# Patient Record
Sex: MALE | Race: WHITE | NOT HISPANIC OR LATINO | Employment: OTHER | URBAN - METROPOLITAN AREA
[De-identification: names, ages, dates, MRNs, and addresses within clinical notes are randomized per-mention and may not be internally consistent; named-entity substitution may affect disease eponyms.]

---

## 2022-02-07 ENCOUNTER — LAB VISIT (OUTPATIENT)
Dept: PRIMARY CARE CLINIC | Facility: OTHER | Age: 77
End: 2022-02-07
Attending: INTERNAL MEDICINE
Payer: MEDICARE

## 2022-02-07 DIAGNOSIS — U07.1 COVID-19: Primary | ICD-10-CM

## 2022-02-07 LAB
CTP QC/QA: YES
SARS-COV-2 AG RESP QL IA.RAPID: NEGATIVE

## 2022-02-07 PROCEDURE — 87811 SARS-COV-2 COVID19 W/OPTIC: CPT

## 2022-02-07 NOTE — PROGRESS NOTES
Instructions for Patients with Confirmed or Suspected COVID-19    If you are awaiting your test result, you will either be called or it will be released to the patient portal.  If you have any questions about your test, please visit [email or web address] or call our COVID-19 information line at [phone].      Please isolate yourself at home.  You may leave home and/or return to work once the following conditions are met:    If you have symptoms and tested positive:   More than 5 days since symptoms first appeared AND   More than 24 hours fever free without medications AND       symptoms have improved   · For five days after ending isolation, masks are required.    If you had no symptoms but tested positive:   More than 5 days since the date of the first positive test. If you develop symptoms, then use the guidelines above  · For five days after ending isolation, masks are required.      Testing is not recommended if you are symptom free after completing isolation.

## 2022-03-24 ENCOUNTER — LAB VISIT (OUTPATIENT)
Dept: LAB | Facility: HOSPITAL | Age: 77
End: 2022-03-24
Attending: PSYCHIATRY & NEUROLOGY
Payer: MEDICARE

## 2022-03-24 ENCOUNTER — OFFICE VISIT (OUTPATIENT)
Dept: NEUROLOGY | Facility: CLINIC | Age: 77
End: 2022-03-24
Payer: MEDICARE

## 2022-03-24 VITALS
HEART RATE: 50 BPM | DIASTOLIC BLOOD PRESSURE: 98 MMHG | WEIGHT: 272.25 LBS | RESPIRATION RATE: 16 BRPM | HEIGHT: 71 IN | BODY MASS INDEX: 38.11 KG/M2 | SYSTOLIC BLOOD PRESSURE: 160 MMHG

## 2022-03-24 DIAGNOSIS — M54.12 CERVICAL RADICULOPATHY: ICD-10-CM

## 2022-03-24 DIAGNOSIS — E66.01 SEVERE OBESITY: ICD-10-CM

## 2022-03-24 DIAGNOSIS — M54.16 LUMBAR RADICULOPATHY: ICD-10-CM

## 2022-03-24 DIAGNOSIS — R20.0 ANESTHESIA OF SKIN: ICD-10-CM

## 2022-03-24 DIAGNOSIS — G62.9 POLYNEUROPATHY: Primary | ICD-10-CM

## 2022-03-24 DIAGNOSIS — G56.03 BILATERAL CARPAL TUNNEL SYNDROME: ICD-10-CM

## 2022-03-24 DIAGNOSIS — R29.6 FALLS FREQUENTLY: ICD-10-CM

## 2022-03-24 DIAGNOSIS — I48.91 ATRIAL FIBRILLATION, UNSPECIFIED TYPE: ICD-10-CM

## 2022-03-24 DIAGNOSIS — G62.9 POLYNEUROPATHY: ICD-10-CM

## 2022-03-24 DIAGNOSIS — E11.9 TYPE 2 DIABETES MELLITUS WITHOUT COMPLICATION, WITHOUT LONG-TERM CURRENT USE OF INSULIN: ICD-10-CM

## 2022-03-24 LAB
TSH SERPL DL<=0.005 MIU/L-ACNC: 3.55 UIU/ML (ref 0.4–4)
VIT B12 SERPL-MCNC: 289 PG/ML (ref 210–950)

## 2022-03-24 PROCEDURE — 1159F PR MEDICATION LIST DOCUMENTED IN MEDICAL RECORD: ICD-10-PCS | Mod: CPTII,S$GLB,, | Performed by: PSYCHIATRY & NEUROLOGY

## 2022-03-24 PROCEDURE — 1160F PR REVIEW ALL MEDS BY PRESCRIBER/CLIN PHARMACIST DOCUMENTED: ICD-10-PCS | Mod: CPTII,S$GLB,, | Performed by: PSYCHIATRY & NEUROLOGY

## 2022-03-24 PROCEDURE — 1159F MED LIST DOCD IN RCRD: CPT | Mod: CPTII,S$GLB,, | Performed by: PSYCHIATRY & NEUROLOGY

## 2022-03-24 PROCEDURE — 84165 PATHOLOGIST INTERPRETATION SPE: ICD-10-PCS | Mod: 26,,, | Performed by: PATHOLOGY

## 2022-03-24 PROCEDURE — 99204 PR OFFICE/OUTPT VISIT, NEW, LEVL IV, 45-59 MIN: ICD-10-PCS | Mod: S$GLB,,, | Performed by: PSYCHIATRY & NEUROLOGY

## 2022-03-24 PROCEDURE — 3080F DIAST BP >= 90 MM HG: CPT | Mod: CPTII,S$GLB,, | Performed by: PSYCHIATRY & NEUROLOGY

## 2022-03-24 PROCEDURE — 84443 ASSAY THYROID STIM HORMONE: CPT | Performed by: PSYCHIATRY & NEUROLOGY

## 2022-03-24 PROCEDURE — 84165 PROTEIN E-PHORESIS SERUM: CPT | Performed by: PSYCHIATRY & NEUROLOGY

## 2022-03-24 PROCEDURE — 86334 IMMUNOFIX E-PHORESIS SERUM: CPT | Mod: 26,,, | Performed by: PATHOLOGY

## 2022-03-24 PROCEDURE — 3077F SYST BP >= 140 MM HG: CPT | Mod: CPTII,S$GLB,, | Performed by: PSYCHIATRY & NEUROLOGY

## 2022-03-24 PROCEDURE — 86334 PATHOLOGIST INTERPRETATION IFE: ICD-10-PCS | Mod: 26,,, | Performed by: PATHOLOGY

## 2022-03-24 PROCEDURE — 3077F PR MOST RECENT SYSTOLIC BLOOD PRESSURE >= 140 MM HG: ICD-10-PCS | Mod: CPTII,S$GLB,, | Performed by: PSYCHIATRY & NEUROLOGY

## 2022-03-24 PROCEDURE — 99204 OFFICE O/P NEW MOD 45 MIN: CPT | Mod: S$GLB,,, | Performed by: PSYCHIATRY & NEUROLOGY

## 2022-03-24 PROCEDURE — 3080F PR MOST RECENT DIASTOLIC BLOOD PRESSURE >= 90 MM HG: ICD-10-PCS | Mod: CPTII,S$GLB,, | Performed by: PSYCHIATRY & NEUROLOGY

## 2022-03-24 PROCEDURE — 86334 IMMUNOFIX E-PHORESIS SERUM: CPT | Performed by: PSYCHIATRY & NEUROLOGY

## 2022-03-24 PROCEDURE — 84165 PROTEIN E-PHORESIS SERUM: CPT | Mod: 26,,, | Performed by: PATHOLOGY

## 2022-03-24 PROCEDURE — 36415 COLL VENOUS BLD VENIPUNCTURE: CPT | Performed by: PSYCHIATRY & NEUROLOGY

## 2022-03-24 PROCEDURE — 82607 VITAMIN B-12: CPT | Performed by: PSYCHIATRY & NEUROLOGY

## 2022-03-24 PROCEDURE — 99999 PR PBB SHADOW E&M-EST. PATIENT-LVL IV: ICD-10-PCS | Mod: PBBFAC,,, | Performed by: PSYCHIATRY & NEUROLOGY

## 2022-03-24 PROCEDURE — 1160F RVW MEDS BY RX/DR IN RCRD: CPT | Mod: CPTII,S$GLB,, | Performed by: PSYCHIATRY & NEUROLOGY

## 2022-03-24 PROCEDURE — 1126F AMNT PAIN NOTED NONE PRSNT: CPT | Mod: CPTII,S$GLB,, | Performed by: PSYCHIATRY & NEUROLOGY

## 2022-03-24 PROCEDURE — 99999 PR PBB SHADOW E&M-EST. PATIENT-LVL IV: CPT | Mod: PBBFAC,,, | Performed by: PSYCHIATRY & NEUROLOGY

## 2022-03-24 PROCEDURE — 86592 SYPHILIS TEST NON-TREP QUAL: CPT | Performed by: PSYCHIATRY & NEUROLOGY

## 2022-03-24 PROCEDURE — 1126F PR PAIN SEVERITY QUANTIFIED, NO PAIN PRESENT: ICD-10-PCS | Mod: CPTII,S$GLB,, | Performed by: PSYCHIATRY & NEUROLOGY

## 2022-03-24 RX ORDER — TORSEMIDE 20 MG/1
20 TABLET ORAL DAILY
COMMUNITY
Start: 2022-01-25

## 2022-03-24 RX ORDER — METFORMIN HYDROCHLORIDE 500 MG/1
500 TABLET ORAL DAILY
COMMUNITY
Start: 2022-01-29 | End: 2022-11-21

## 2022-03-24 RX ORDER — RAMIPRIL 10 MG/1
10 CAPSULE ORAL DAILY
COMMUNITY
Start: 2022-01-20 | End: 2024-02-08 | Stop reason: SDUPTHER

## 2022-03-24 RX ORDER — NEBIVOLOL 5 MG/1
5 TABLET ORAL DAILY
COMMUNITY
Start: 2022-03-18 | End: 2022-11-18

## 2022-03-24 RX ORDER — DOXAZOSIN 2 MG/1
2 TABLET ORAL DAILY
COMMUNITY
Start: 2022-01-11 | End: 2024-01-05 | Stop reason: SDUPTHER

## 2022-03-24 RX ORDER — APIXABAN 5 MG/1
5 TABLET, FILM COATED ORAL 2 TIMES DAILY
COMMUNITY
Start: 2022-01-29 | End: 2023-02-13 | Stop reason: SDUPTHER

## 2022-03-24 RX ORDER — AMIODARONE HYDROCHLORIDE 200 MG/1
200 TABLET ORAL DAILY
COMMUNITY
Start: 2022-01-29 | End: 2022-11-18

## 2022-03-24 RX ORDER — ROSUVASTATIN CALCIUM 20 MG/1
20 TABLET, COATED ORAL NIGHTLY
COMMUNITY
Start: 2022-03-13 | End: 2023-06-16 | Stop reason: SDUPTHER

## 2022-03-24 NOTE — PROGRESS NOTES
HPI: Adan Bermudez is a 76 y.o. male here for evaluation of imbalance    He started with symptoms after having a bee sting and having to use epinephrine. He was found to have afib and his dizziness started around this time (2019).    He described no vertigo, states he was more off balance.       These symptoms have persisted.    He did have PT which helped when there.     He does fall frequently and sometimes with injury and has not always used walker    Numbness in the feet is tolerable.    Has some back pain chronically which is not radicular and this has been present for years.     Balance is worse first thing in the morning and worse in the shower- feels swaying.    Not light headed and no syncope.     Brings extensive records which I have reviewed  Saw ENT and had studies noted below    [place] [facility] [place] [place] complaint of tingling int he feet in 2020. He is a diabetic    Has some neck pain which he tolerates and no CTS symptoms    He states his DM2 is well controlled    Patient has a long history speech disorder. Feels for years that he sometimes slurred his speech (noted prior to MRI brain). Monitor      Review of Systems   Constitutional: Negative for fever.   HENT: Negative for nosebleeds.    Eyes: Negative for double vision.   Respiratory: Positive for shortness of breath.         Chronic SOB and states stress test was normal and cardiology recommended weight loss   Cardiovascular: Negative for leg swelling.   Gastrointestinal: Negative for blood in stool.   Genitourinary: Negative for hematuria.   Musculoskeletal: Positive for falls.   Skin: Negative for rash.   Neurological: Positive for sensory change.         I have reviewed all of this patient's past medical and surgical histories as well as family and social histories and active allergies and medications as documented in the electronic medical record.        Exam:  Gen Appearance, well developed/nourished in no apparent distress  CV: 2+ distal  pulses with no edema or swelling  Neuro:  MS: Awake, alert, oriented to place, person, time, situation. Sustains attention. Recent/remote memory intact, Language is full to spontaneous speech/repetition/naming/comprehension. Fund of Knowledge is full  CN: Optic discs are flat with normal vasculature, PERRL, Extraoccular movements and visual fields are full. Normal facial sensation and strength, Hearing symmetric, Tongue and Palate are midline and strong. Shoulder Shrug symmetric and strong.  Motor: Normal bulk, tone, no abnormal movements. 5/5 strength bilateral upper/lower extremities with 1+ reflexes and no clonus  Sensory: symmetric to light touch, pain, temp, and vibration but reduced in feet to all modalities, Romberg positive  Cerebellar: Finger-nose,Heal-shin, Rapid alternating movements intact  Gait: Normal stance, no ataxia and uses walker well    Imagin EMG/NCS of the arms: C5 and C6 more than C7 radicular disease and right more than left CTS   EMG/NCS of the legs: sensory and motor polyneuropathy and lumbar radicular changes    Labs:  CMP, CBC, unremarkable      Assessment/Plan: Adan Bermudez is a 76 y.o. male with imbalance for years. Found to have polyneuropathy by EMG/NCS in 2020 with Dr Stevens. He continues with poor balance and frequent falls.   I recommend:     1. Per outside/ [facility] records  - MRI brain showed mild atrophy and white matter changes  - EMG/NCS of the legs: sensory and motor polyneuropathy and lumbar radicular change. Has some back pain chronically which is not radicular and this has been present for years.Tolerates this well. Consider MRI L spine is worse  - EMG/NCS of the arms: C5 and C6 more than C7 radicular disease and right more than left CTS  -Per review of 2020 records from outside neurology ([facility]) Compression fractures (chronic) noted by MRI T spine and MRI C spine showed multilevel NF narrowing and disc bulging  without herniation  -Tolerates all  spinal pain well. No symptoms from CTS at this time.   -Patient has a long history speech disorder. Feels for years that he sometimes mildly slurs his speech (noted prior to MRI brain). Monitor    2. 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 Goal A1C less than 7  -Refer to PT again as this helped prior. Currently has chronic frequent falls.   -urged him to use a walker at all times. Has shower chair, handles    3. Dizziness saw ENT prior and currently  -2019 VNG suggested a central process  -recommended to have PT per ENT recently    4. Bradycardia/ light headedness noted prior/ not now.  Also has COLVIN followed by cardiology  -on NOAC for afib  -Urged him to reduce obesity via reduction of fried foods, rice and bread.     RTC 6 months

## 2022-03-25 LAB
ALBUMIN SERPL ELPH-MCNC: 3.15 G/DL (ref 3.35–5.55)
ALPHA1 GLOB SERPL ELPH-MCNC: 0.35 G/DL (ref 0.17–0.41)
ALPHA2 GLOB SERPL ELPH-MCNC: 0.77 G/DL (ref 0.43–0.99)
B-GLOBULIN SERPL ELPH-MCNC: 0.82 G/DL (ref 0.5–1.1)
GAMMA GLOB SERPL ELPH-MCNC: 1 G/DL (ref 0.67–1.58)
INTERPRETATION SERPL IFE-IMP: NORMAL
PROT SERPL-MCNC: 6.1 G/DL (ref 6–8.4)
RPR SER QL: NORMAL

## 2022-03-28 LAB
PATHOLOGIST INTERPRETATION IFE: NORMAL
PATHOLOGIST INTERPRETATION SPE: NORMAL

## 2022-06-07 ENCOUNTER — OFFICE VISIT (OUTPATIENT)
Dept: NEUROLOGY | Facility: CLINIC | Age: 77
End: 2022-06-07
Payer: MEDICARE

## 2022-06-07 VITALS
HEART RATE: 58 BPM | SYSTOLIC BLOOD PRESSURE: 132 MMHG | BODY MASS INDEX: 37.97 KG/M2 | RESPIRATION RATE: 16 BRPM | HEIGHT: 71 IN | DIASTOLIC BLOOD PRESSURE: 90 MMHG

## 2022-06-07 DIAGNOSIS — R26.89 IMBALANCE: ICD-10-CM

## 2022-06-07 DIAGNOSIS — R42 DIZZINESS: ICD-10-CM

## 2022-06-07 DIAGNOSIS — I48.91 ATRIAL FIBRILLATION, UNSPECIFIED TYPE: ICD-10-CM

## 2022-06-07 DIAGNOSIS — E78.5 HYPERLIPIDEMIA, UNSPECIFIED HYPERLIPIDEMIA TYPE: ICD-10-CM

## 2022-06-07 DIAGNOSIS — R29.898 BILATERAL LEG WEAKNESS: ICD-10-CM

## 2022-06-07 DIAGNOSIS — R29.6 RECURRENT FALLS: ICD-10-CM

## 2022-06-07 DIAGNOSIS — G89.29 CHRONIC LOW BACK PAIN, UNSPECIFIED BACK PAIN LATERALITY, UNSPECIFIED WHETHER SCIATICA PRESENT: Primary | ICD-10-CM

## 2022-06-07 DIAGNOSIS — M54.50 CHRONIC LOW BACK PAIN, UNSPECIFIED BACK PAIN LATERALITY, UNSPECIFIED WHETHER SCIATICA PRESENT: Primary | ICD-10-CM

## 2022-06-07 PROCEDURE — 99999 PR PBB SHADOW E&M-EST. PATIENT-LVL IV: CPT | Mod: PBBFAC,,, | Performed by: NURSE PRACTITIONER

## 2022-06-07 PROCEDURE — 1160F PR REVIEW ALL MEDS BY PRESCRIBER/CLIN PHARMACIST DOCUMENTED: ICD-10-PCS | Mod: CPTII,S$GLB,, | Performed by: NURSE PRACTITIONER

## 2022-06-07 PROCEDURE — 3075F PR MOST RECENT SYSTOLIC BLOOD PRESS GE 130-139MM HG: ICD-10-PCS | Mod: CPTII,S$GLB,, | Performed by: NURSE PRACTITIONER

## 2022-06-07 PROCEDURE — 3080F DIAST BP >= 90 MM HG: CPT | Mod: CPTII,S$GLB,, | Performed by: NURSE PRACTITIONER

## 2022-06-07 PROCEDURE — 1160F RVW MEDS BY RX/DR IN RCRD: CPT | Mod: CPTII,S$GLB,, | Performed by: NURSE PRACTITIONER

## 2022-06-07 PROCEDURE — 99214 OFFICE O/P EST MOD 30 MIN: CPT | Mod: S$GLB,,, | Performed by: NURSE PRACTITIONER

## 2022-06-07 PROCEDURE — 3075F SYST BP GE 130 - 139MM HG: CPT | Mod: CPTII,S$GLB,, | Performed by: NURSE PRACTITIONER

## 2022-06-07 PROCEDURE — 1126F AMNT PAIN NOTED NONE PRSNT: CPT | Mod: CPTII,S$GLB,, | Performed by: NURSE PRACTITIONER

## 2022-06-07 PROCEDURE — 99999 PR PBB SHADOW E&M-EST. PATIENT-LVL IV: ICD-10-PCS | Mod: PBBFAC,,, | Performed by: NURSE PRACTITIONER

## 2022-06-07 PROCEDURE — 1126F PR PAIN SEVERITY QUANTIFIED, NO PAIN PRESENT: ICD-10-PCS | Mod: CPTII,S$GLB,, | Performed by: NURSE PRACTITIONER

## 2022-06-07 PROCEDURE — 99214 PR OFFICE/OUTPT VISIT, EST, LEVL IV, 30-39 MIN: ICD-10-PCS | Mod: S$GLB,,, | Performed by: NURSE PRACTITIONER

## 2022-06-07 PROCEDURE — 1159F MED LIST DOCD IN RCRD: CPT | Mod: CPTII,S$GLB,, | Performed by: NURSE PRACTITIONER

## 2022-06-07 PROCEDURE — 1159F PR MEDICATION LIST DOCUMENTED IN MEDICAL RECORD: ICD-10-PCS | Mod: CPTII,S$GLB,, | Performed by: NURSE PRACTITIONER

## 2022-06-07 PROCEDURE — 3080F PR MOST RECENT DIASTOLIC BLOOD PRESSURE >= 90 MM HG: ICD-10-PCS | Mod: CPTII,S$GLB,, | Performed by: NURSE PRACTITIONER

## 2022-06-07 RX ORDER — MECLIZINE HYDROCHLORIDE 25 MG/1
25 TABLET ORAL 3 TIMES DAILY
COMMUNITY
Start: 2022-06-03 | End: 2022-09-27

## 2022-06-07 NOTE — PROGRESS NOTES
HPI: Adan Bermudez is a 77 y.o. male with imbalance and speech issues for years. He started with symptoms after having a bee sting and having to use epinephrine. He was found to have afib and his dizziness started around this time (2019). Found to have polyneuropathy by EMG/NCS in 2020 with Dr Stevens.     He presents today with report of ongoing gait imbalance, with increased falls, which wife feels is getting worse. He has been going to PT for gait/balance therapy, and continues this. His wife believes that this right knee issues could be contributing. History of right knee surgery with some complications after this.    He does have chronic, intermittent lumbar pain. MRI C-spine and T-spine done per [facility], but no MRI L spine upon chart review.     He continues with dizziness, which he describes as a room spinning sensation. He has seen the ENT again.     He is also in speech therapy for slurred speech at times.     He continues with poor balance and frequent falls. He does use a walker for balance.     He has lightheadedness at times with nausea after taking a shower, but reports to having normal BP at home, without hypotension.     Numbness in the feet is tolerable.    Has some neck pain which he tolerates and no CTS symptoms    He states his DM2 is well controlled    Review of Systems   Constitutional: Negative for fever.   HENT: Negative for nosebleeds.    Eyes: Negative for double vision.   Respiratory: Positive for shortness of breath.         Chronic SOB and states stress test was normal and cardiology recommended weight loss   Cardiovascular: Negative for leg swelling.   Gastrointestinal: Negative for blood in stool.   Genitourinary: Negative for hematuria.   Musculoskeletal: Positive for back pain and falls.   Skin: Negative for rash.   Neurological: Positive for dizziness and sensory change.       I have reviewed all of this patient's past medical and surgical histories as well as family and social  histories and active allergies and medications as documented in the electronic medical record.    Exam:  Gen Appearance, well developed/nourished in no apparent distress  CV: 2+ distal pulses with no edema or swelling  Neuro:  MS: Awake, alert, oriented to place, person, time, situation. Sustains attention. Recent/remote memory intact, Language is full to spontaneous speech/repetition/naming/comprehension. Fund of Knowledge is full  CN: Optic discs are flat with normal vasculature, PERRL, Extraoccular movements and visual fields are full. + Nystagmus on exam today, Normal facial sensation and strength, Hearing symmetric, Tongue and Palate are midline and strong. Shoulder Shrug symmetric and strong.  Motor: Normal bulk, tone, no abnormal movements. 5/5 strength bilateral upper/lower extremities with 1+ reflexes and no clonus  Sensory: symmetric to light touch, pain, temp, and vibration but reduced in feet to all modalities, Romberg positive  Cerebellar: Finger-nose,Heal-shin, Rapid alternating movements intact  Gait: Normal stance, no ataxia and uses walker well    Imagin EMG/NCS of the arms: C5 and C6 more than C7 radicular disease and right more than left CTS   EMG/NCS of the legs: sensory and motor polyneuropathy and lumbar radicular changes    Labs:    CMP, CBC, unremarkable  3/2022  TSH, B12, SPEP, PEE, RPR- B12 low     Assessment/Plan: Adan Bermudez is a 77 y.o. male with imbalance for years. Found to have polyneuropathy by EMG/NCS in  with Dr Stevens. He continues with poor balance and frequent falls.   I recommend:     1. Per outside/ [facility] [place]  - MRI brain showed mild atrophy and white matter changes  -2020 EMG/NCS of the legs: sensory and motor polyneuropathy and lumbar radicular change. Has some back pain chronically.   -2020 EMG/NCS of the arms: C5 and C6 more than C7 radicular disease and right more than left CTS  -Per review of 2020 records from outside neurology ([facility]  Compression fractures (chronic) noted by MRI T spine and MRI C spine showed multilevel NF narrowing and disc bulging  without herniation.    2. MRI L-spine per orders to evaluate for stenosis, given his recurrent falls and gait issues with chronic lumbar pain.     -His gait imbalance may be a separate issue from his vertiginous type complaints, as he has chronic lumbar pain, intrinsic issues of the right knee, and polyneuropathy, all of which, can contribute to gait imbalance.     3. Add PT specifically for vestibular rehab to his gait/balance therapy, given his vertiginous complaints today.     -urged him to use a walker at all times. Has shower chair, handles.     He has seen an ENT for his dizziness. VNG suggested central cause prior. ENT recently suggested PT, but current PT is for gait/balance only per patient and not for vestibular rehab.     4. No symptoms from CTS at this time.     5. Patient has a long history speech disorder. Feels for years that he sometimes mildly slurs his speech (noted prior to MRI brain). Monitor.   -he is in speech therapy currently.     6. Continue supplementation for low normal B12 levels.   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 Goal A1C less than 7.     7. Bradycardia/ light headedness noted prior.  Also has COLVIN followed by cardiology  -on NOAC for A-fib.  -Urged him to reduce obesity via reduction of fried foods, rice and bread.     RTC 3 month as scheduled w Dr. Guevara

## 2022-07-11 ENCOUNTER — TELEPHONE (OUTPATIENT)
Dept: NEUROLOGY | Facility: CLINIC | Age: 77
End: 2022-07-11
Payer: MEDICARE

## 2022-07-11 NOTE — TELEPHONE ENCOUNTER
7/7/2022 [facility] Authorization approved outpatient speech therapy, auth period 5/2/2022-8/13/2022, placed to scan in chart.

## 2022-08-04 ENCOUNTER — TELEPHONE (OUTPATIENT)
Dept: NEUROLOGY | Facility: CLINIC | Age: 77
End: 2022-08-04
Payer: MEDICARE

## 2022-08-04 NOTE — TELEPHONE ENCOUNTER
----- Message from Melinda Chicas MA sent at 2022  2:11 PM CDT -----  Contact: WIFE - BOZENA  Awaiting MRI report from [facility]  ----- Message -----  From: Julia Gooden  Sent: 2022  11:38 AM CDT  To: Paola SMITH Staff    Adan Bermudez  MRN: [id number]  : 1945  PCP: Angus Ervin  Home Phone      [phone]  Work Phone      Not on file.  Mobile          [phone]      MESSAGE: Wife states that the patient had an MRI done approximately 3 months ago and has not received a call to give them the results.        Phone: [phone]

## 2022-08-04 NOTE — TELEPHONE ENCOUNTER
Mild to moderate degenerative changes on MRI L-spine, which don't explain leg weakness, but can contribute to pain. Would he like a referral to pain management?

## 2022-08-04 NOTE — TELEPHONE ENCOUNTER
Spouse notified of results  States she will call back with a decision if they want to do pain management.

## 2022-09-02 ENCOUNTER — TELEPHONE (OUTPATIENT)
Dept: PAIN MEDICINE | Facility: CLINIC | Age: 77
End: 2022-09-02

## 2022-09-02 ENCOUNTER — HOSPITAL ENCOUNTER (OUTPATIENT)
Dept: RADIOLOGY | Facility: HOSPITAL | Age: 77
Discharge: HOME | End: 2022-09-02
Attending: PHYSICAL MEDICINE & REHABILITATION
Payer: MEDICARE

## 2022-09-02 ENCOUNTER — OFFICE VISIT (OUTPATIENT)
Dept: PAIN MEDICINE | Facility: CLINIC | Age: 77
End: 2022-09-02
Payer: MEDICARE

## 2022-09-02 VITALS
HEIGHT: 71 IN | HEART RATE: 48 BPM | WEIGHT: 255.94 LBS | BODY MASS INDEX: 35.83 KG/M2 | RESPIRATION RATE: 16 BRPM | SYSTOLIC BLOOD PRESSURE: 144 MMHG | DIASTOLIC BLOOD PRESSURE: 90 MMHG

## 2022-09-02 DIAGNOSIS — G89.29 CHRONIC NECK PAIN: ICD-10-CM

## 2022-09-02 DIAGNOSIS — M53.82 DECREASED RANGE OF MOTION OF INTERVERTEBRAL DISCS OF CERVICAL SPINE: ICD-10-CM

## 2022-09-02 DIAGNOSIS — M54.2 CHRONIC NECK PAIN: ICD-10-CM

## 2022-09-02 DIAGNOSIS — M54.50 CHRONIC BILATERAL LOW BACK PAIN WITHOUT SCIATICA: ICD-10-CM

## 2022-09-02 DIAGNOSIS — Z74.09 IMPAIRED FUNCTIONAL MOBILITY, BALANCE, GAIT, AND ENDURANCE: ICD-10-CM

## 2022-09-02 DIAGNOSIS — M47.812 CERVICAL SPONDYLOSIS: Primary | ICD-10-CM

## 2022-09-02 DIAGNOSIS — M54.2 CERVICALGIA: ICD-10-CM

## 2022-09-02 DIAGNOSIS — M79.89 PARASPINAL SOFT TISSUE MASS: Primary | ICD-10-CM

## 2022-09-02 DIAGNOSIS — M53.86 DECREASED RANGE OF MOTION OF LUMBAR SPINE: ICD-10-CM

## 2022-09-02 DIAGNOSIS — G89.29 CHRONIC BILATERAL LOW BACK PAIN WITHOUT SCIATICA: ICD-10-CM

## 2022-09-02 PROCEDURE — 72050 X-RAY EXAM NECK SPINE 4/5VWS: CPT | Mod: TC

## 2022-09-02 PROCEDURE — 3288F PR FALLS RISK ASSESSMENT DOCUMENTED: ICD-10-PCS | Mod: CPTII,S$GLB,, | Performed by: PHYSICAL MEDICINE & REHABILITATION

## 2022-09-02 PROCEDURE — 1101F PT FALLS ASSESS-DOCD LE1/YR: CPT | Mod: CPTII,S$GLB,, | Performed by: PHYSICAL MEDICINE & REHABILITATION

## 2022-09-02 PROCEDURE — 99204 OFFICE O/P NEW MOD 45 MIN: CPT | Mod: S$GLB,,, | Performed by: PHYSICAL MEDICINE & REHABILITATION

## 2022-09-02 PROCEDURE — 3080F DIAST BP >= 90 MM HG: CPT | Mod: CPTII,S$GLB,, | Performed by: PHYSICAL MEDICINE & REHABILITATION

## 2022-09-02 PROCEDURE — 3077F PR MOST RECENT SYSTOLIC BLOOD PRESSURE >= 140 MM HG: ICD-10-PCS | Mod: CPTII,S$GLB,, | Performed by: PHYSICAL MEDICINE & REHABILITATION

## 2022-09-02 PROCEDURE — 3080F PR MOST RECENT DIASTOLIC BLOOD PRESSURE >= 90 MM HG: ICD-10-PCS | Mod: CPTII,S$GLB,, | Performed by: PHYSICAL MEDICINE & REHABILITATION

## 2022-09-02 PROCEDURE — 72050 X-RAY EXAM NECK SPINE 4/5VWS: CPT | Mod: 26,,, | Performed by: RADIOLOGY

## 2022-09-02 PROCEDURE — 1160F RVW MEDS BY RX/DR IN RCRD: CPT | Mod: CPTII,S$GLB,, | Performed by: PHYSICAL MEDICINE & REHABILITATION

## 2022-09-02 PROCEDURE — 3077F SYST BP >= 140 MM HG: CPT | Mod: CPTII,S$GLB,, | Performed by: PHYSICAL MEDICINE & REHABILITATION

## 2022-09-02 PROCEDURE — 1160F PR REVIEW ALL MEDS BY PRESCRIBER/CLIN PHARMACIST DOCUMENTED: ICD-10-PCS | Mod: CPTII,S$GLB,, | Performed by: PHYSICAL MEDICINE & REHABILITATION

## 2022-09-02 PROCEDURE — 1159F PR MEDICATION LIST DOCUMENTED IN MEDICAL RECORD: ICD-10-PCS | Mod: CPTII,S$GLB,, | Performed by: PHYSICAL MEDICINE & REHABILITATION

## 2022-09-02 PROCEDURE — 99999 PR PBB SHADOW E&M-EST. PATIENT-LVL V: CPT | Mod: PBBFAC,,, | Performed by: PHYSICAL MEDICINE & REHABILITATION

## 2022-09-02 PROCEDURE — 1159F MED LIST DOCD IN RCRD: CPT | Mod: CPTII,S$GLB,, | Performed by: PHYSICAL MEDICINE & REHABILITATION

## 2022-09-02 PROCEDURE — 3288F FALL RISK ASSESSMENT DOCD: CPT | Mod: CPTII,S$GLB,, | Performed by: PHYSICAL MEDICINE & REHABILITATION

## 2022-09-02 PROCEDURE — 99204 PR OFFICE/OUTPT VISIT, NEW, LEVL IV, 45-59 MIN: ICD-10-PCS | Mod: S$GLB,,, | Performed by: PHYSICAL MEDICINE & REHABILITATION

## 2022-09-02 PROCEDURE — 1125F PR PAIN SEVERITY QUANTIFIED, PAIN PRESENT: ICD-10-PCS | Mod: CPTII,S$GLB,, | Performed by: PHYSICAL MEDICINE & REHABILITATION

## 2022-09-02 PROCEDURE — 1125F AMNT PAIN NOTED PAIN PRSNT: CPT | Mod: CPTII,S$GLB,, | Performed by: PHYSICAL MEDICINE & REHABILITATION

## 2022-09-02 PROCEDURE — 1101F PR PT FALLS ASSESS DOC 0-1 FALLS W/OUT INJ PAST YR: ICD-10-PCS | Mod: CPTII,S$GLB,, | Performed by: PHYSICAL MEDICINE & REHABILITATION

## 2022-09-02 PROCEDURE — 99999 PR PBB SHADOW E&M-EST. PATIENT-LVL V: ICD-10-PCS | Mod: PBBFAC,,, | Performed by: PHYSICAL MEDICINE & REHABILITATION

## 2022-09-02 PROCEDURE — 72050 XR CERVICAL SPINE COMPLETE 5 VIEW: ICD-10-PCS | Mod: 26,,, | Performed by: RADIOLOGY

## 2022-09-02 NOTE — LETTER
AUTHORIZATION FOR RELEASE OF   CONFIDENTIAL INFORMATION    Dear Dr. Ervin,    We are seeing Adan Bermudez, date of birth 1945, in the clinic at [facility]-Dr. Doyle. Adan Bermudez has authorized us to request the following medical record(s):                         ( x )  OUTSIDE LAB RESULTS (most recent CBC and CMP)          Please fax records to [facility], Kelly Paulk, MD,  Ph. [phone]  Fx. [phone]            Patient Name: Adan Bermudez  : 1945  Patient Phone #: [phone]

## 2022-09-02 NOTE — TELEPHONE ENCOUNTER
Bilateral C3-4, C4-5 MBB scheduled 09/16/2022. Patient will need clearance to hold Eliquis x 3 days prior to procedure. Clearance request faxed to [facility]. Advised that pre admit would contact patient with time of procedure. Advised patient to contact office if he starts any type of antibiotics or new blood thinners. Voiced understanding.

## 2022-09-02 NOTE — LETTER
AUTHORIZATION FOR RELEASE OF   CONFIDENTIAL INFORMATION    Dear [facility],    We are seeing Adan Bermudez, date of birth 1945, in the clinic at [facility]-Dr. Doyle. Adan Bermudez has authorized us to request the following medical record(s):              ( x )  __Most recent imaging___        Please fax records to [facility], Kelly Paulk, MD,  Ph. [phone]  Fx. [phone]              Patient Name: Adan Bermudez  : 1945  Patient Phone #: [phone]

## 2022-09-02 NOTE — H&P (VIEW-ONLY)
[facility]  New Patient H&P    Referring Provider: Gonzalez Guevara Md  [address]  [place] [address]    Chief Complaint:   Chief Complaint   Patient presents with    Low-back Pain    Leg Pain       History of Present Illness: Adan Bermudez is a 77 y.o. male referred by Dr. Gonzalez Guevara for LBP.  He actually notes more neck pain than back pain today and would like to talk about that.     Low Back Pain:  Onset: 2 years ago around the time he had a knee replacement  Location: bilateral low back  Radiation: no radiation down legs, but legs feel heavy and weak with standing  Timing: intermittent, pain occurs when he wakes in the morning and when he stands too long  Quality: Aching  Exacerbating Factors: standing and walking, first thing in the morning  Alleviating Factors: rest  Associated Symptoms: He feels weakness in both legs, unstable on his feet. He denies night fever/night sweats, urinary incontinence, bowel incontinence, significant weight loss, and loss of sensations      Neck pain has been present 6-7 years. Denies traumatic onset. Pain is localized to the bilateral upper cervical paraspinal regions with no radiation down the arms. Pain is described as unbearable. The pain is intermittent and aggravated by certain head positions, turning head. The pain is alleviated by tylenol. He denies weakness or numbness in the arms. Denies changes in bowel or bladder function. He has noticed changes in hand writing. He denies difficulty with buttons He has had changes in gait. Denies recent fevers or infections. Denies unexplained weight loss.    He has been in PT for nearly 2 years for vertigo, impaired gait.    Severity: Currently: 3 /10   Typical Range: 3 -5/10     Exacerbation: 5/10     Pain Disability Index  Family/Home Responsibilities:: 0  Recreation:: 4  Social Activity:: 7  Occupation:: 3  Sexual Behavior:: 10  Self Care:: 10  Life-Support Activities:: 9  Pain Disability Index (PDI):  "43    Previous Interventions:  -     Previous Therapies:  PT/OT: yes   Relevant Surgery: no   Previous Medications:   - NSAIDS: Tylenol.   - Muscle Relaxants:    - TCAs:   - SNRIs:   - Topicals:   - Anticonvulsants:    - Opioids:     Current Pain Medications:  Tylenol     Blood Thinners: Eliquis    Full Medication List:    Current Outpatient Medications:     amiodarone (PACERONE) 200 MG Tab, Take 200 mg by mouth 2 (two) times daily., Disp: , Rfl:     doxazosin (CARDURA) 2 MG tablet, Take 2 mg by mouth once daily., Disp: , Rfl:     ELIQUIS 5 mg Tab, Take 5 mg by mouth 2 (two) times daily., Disp: , Rfl:     meclizine (ANTIVERT) 25 mg tablet, Take 25 mg by mouth 3 (three) times daily., Disp: , Rfl:     metFORMIN (GLUCOPHAGE) 500 MG tablet, Take 500 mg by mouth once daily., Disp: , Rfl:     nebivoloL (BYSTOLIC) 5 MG Tab, Take 5 mg by mouth once daily., Disp: , Rfl:     ramipriL (ALTACE) 10 MG capsule, Take 10 mg by mouth once daily., Disp: , Rfl:     rosuvastatin (CRESTOR) 20 MG tablet, Take 20 mg by mouth nightly., Disp: , Rfl:     torsemide (DEMADEX) 20 MG Tab, Take 40 mg by mouth once daily., Disp: , Rfl:      Review of Systems:  ROS    Allergies:  Bee sting [allergen ext-venom-honey bee]     Medical History:   has a past medical history of A-fib, CKD (chronic kidney disease), Diabetes mellitus, HLD (hyperlipidemia), Hypertension, and Venous insufficiency of both lower extremities.    Surgical History:   has a past surgical history that includes Total knee replacement using computer navigation (Bilateral, 07/2019).    Family History:  family history includes Heart disease in his father and mother.    Social History:   reports that he has never smoked. He has never used smokeless tobacco. He reports that he does not currently use alcohol.    Physical Exam:  BP (!) 144/90 (BP Location: Left arm, Patient Position: Sitting, BP Method: Large (Manual))   Pulse (!) 48   Resp 16   Ht 5' 11" (1.803 m)   Wt 116.1 kg (255 " lb 15.3 oz)   BMI 35.70 kg/m²   GEN: No acute distress. Calm, comfortable  HENT: Normocephalic, atraumatic, moist mucous membranes  EYE: Anicteric sclera, non-injected.   CV: Non-diaphoretic.   RESP: Breathing comfortably. Chest expansion symmetric.  EXT: No clubbing, cyanosis.   SKIN: Warm, & dry to palpation. No visible rashes or lesions of exposed skin.   PSYCH: Pleasant mood and appropriate affect. Recent and remote memory intact.   GAIT: Poor balance  Neck Exam:       Inspection: No erythema, bruising. Forward head       Palpation: (+) TTP of bilateral upper cervical paraspinals      ROM:  Limitation in all planes, but pain mostly with lateral bending & rotation.      Provocative Maneuvers:  (-) Spurling's bilaterally  Lumbar Spine Exam:       Inspection: No erythema, bruising.       Palpation: Palpable, non-tender, mobile, large soft tissue mass in the left paraspinal region above the left iliac crest. (+) TTP of lumbar paraspinals bilaterally       ROM: Limited in flexion, extension, lateral bending.       (+) Facet loading bilaterally      (-) Straight Leg Raise bilaterally      (-) SADIQ bilaterally  Hip Exam:      Inspection: No gross deformity or apparent leg length discrepancy      Palpation: (+) TTP to bilateral greater trochanteric bursas, piriformis.       ROM: limitation in internal rotation, external rotation b/l  Neurologic Exam:     Alert. Speech is fluent and appropriate.     Strength: 4/5 in b/l hip flexion, otherwise 5/5 throughout bilateral upper & lower extremities     Sensation:  Grossly intact to light touch in bilateral upper & lower extremities     Reflexes: Absent in b/l patella, achilles, and 1+ in b/l biceps, brachioradialis, triceps     Tone: No abnormality appreciated in bilateral upper or lower extremities     (-) Michael bilaterally     No Clonus     Downgoing toes on plantar stimulation      Imaging:  - MRI Lumbar spine 6/20/22:      Labs:  BMP  No results found for: NA, K, CL,  CO2, BUN, CREATININE, CALCIUM, ANIONGAP, ESTGFRAFRICA, EGFRNONAA  No results found for: ALT, AST, GGT, ALKPHOS, BILITOT  No results found for: PLT    Assessment:  Adan Bermudez is a 77 y.o. male with the following diagnoses based on history, exam, and imaging:    Problem List Items Addressed This Visit          Orthopedic    Chronic low back pain    Relevant Orders    CT Lumbar Spine Without Contrast     Other Visit Diagnoses       Paraspinal soft tissue mass    -  Primary    Relevant Orders    CT Lumbar Spine Without Contrast    US Soft Tissue Lower Back    Chronic neck pain        Relevant Orders    MRI Cervical Spine Without Contrast    X-Ray Cervical Spine Complete 5 view    Ambulatory referral/consult to Physical/Occupational Therapy    Decreased range of motion of lumbar spine        Decreased range of motion of intervertebral discs of cervical spine        Relevant Orders    MRI Cervical Spine Without Contrast    X-Ray Cervical Spine Complete 5 view    Ambulatory referral/consult to Physical/Occupational Therapy    Impaired functional mobility, balance, gait, and endurance        Relevant Orders    MRI Cervical Spine Without Contrast    Cervicalgia        Relevant Orders    MRI Cervical Spine Without Contrast    X-Ray Cervical Spine Complete 5 view    Ambulatory referral/consult to Physical/Occupational Therapy            This is a pleasant 77 y.o. gentleman presenting with:     - Chronic neck: Appears to be facetogenic in the upper cervical spine  - Chronic bilateral low back pain: Pain appears primarily facetogenic. Moderate foraminal narrowing at L5-S1 on prior MRI, but no significant canal narrowing to clearly explain his leg symptoms though does seem like LSS.   - Soft tissue mass in left paraspinal region above iliac crest  - Comorbidities: Paroxysmal A-fib on eliquis. HTN. DM2. CKD.     Treatment Plan:   - PT/OT/HEP: Refer for further PT of neck pain. Cont HEP and transition from PT as has done  extensive PT. Discussed benefits of exercise for pain.   - Procedures: Schedule Bilateral C3-4, C4-5 diagnostic MBBs   - Consider bilateral L5 TF TANESHA  - Medications: No changes recommended at this time.  - Imaging: Reviewed.   - Order x-ray of C-spine and MRI of c-spine.   - Order CT and ultrasound of lumbar spine to assess mass.   - Consider thoracic MRI.   - Labs: Reviewed.    - Request outside lab records and any further imaging from [facility].    Follow Up: RTC - call with results of MBBs.     Amber Doyle M.D.  Interventional Pain Medicine / Physical Medicine & Rehabilitation    Disclaimer: This note was partly generated using dictation software which may occasionally result in transcription errors.

## 2022-09-02 NOTE — PROGRESS NOTES
[facility]  New Patient H&P    Referring Provider: Gonzalez Guevara Md  [address]  [place] [address]    Chief Complaint:   Chief Complaint   Patient presents with    Low-back Pain    Leg Pain       History of Present Illness: Adan Bermudez is a 77 y.o. male referred by Dr. Gonzalez Guevara for LBP.  He actually notes more neck pain than back pain today and would like to talk about that.     Low Back Pain:  Onset: 2 years ago around the time he had a knee replacement  Location: bilateral low back  Radiation: no radiation down legs, but legs feel heavy and weak with standing  Timing: intermittent, pain occurs when he wakes in the morning and when he stands too long  Quality: Aching  Exacerbating Factors: standing and walking, first thing in the morning  Alleviating Factors: rest  Associated Symptoms: He feels weakness in both legs, unstable on his feet. He denies night fever/night sweats, urinary incontinence, bowel incontinence, significant weight loss, and loss of sensations      Neck pain has been present 6-7 years. Denies traumatic onset. Pain is localized to the bilateral upper cervical paraspinal regions with no radiation down the arms. Pain is described as unbearable. The pain is intermittent and aggravated by certain head positions, turning head. The pain is alleviated by tylenol. He denies weakness or numbness in the arms. Denies changes in bowel or bladder function. He has noticed changes in hand writing. He denies difficulty with buttons He has had changes in gait. Denies recent fevers or infections. Denies unexplained weight loss.    He has been in PT for nearly 2 years for vertigo, impaired gait.    Severity: Currently: 3 /10   Typical Range: 3 -5/10     Exacerbation: 5/10     Pain Disability Index  Family/Home Responsibilities:: 0  Recreation:: 4  Social Activity:: 7  Occupation:: 3  Sexual Behavior:: 10  Self Care:: 10  Life-Support Activities:: 9  Pain Disability Index (PDI):  "43    Previous Interventions:  -     Previous Therapies:  PT/OT: yes   Relevant Surgery: no   Previous Medications:   - NSAIDS: Tylenol.   - Muscle Relaxants:    - TCAs:   - SNRIs:   - Topicals:   - Anticonvulsants:    - Opioids:     Current Pain Medications:  Tylenol     Blood Thinners: Eliquis    Full Medication List:    Current Outpatient Medications:     amiodarone (PACERONE) 200 MG Tab, Take 200 mg by mouth 2 (two) times daily., Disp: , Rfl:     doxazosin (CARDURA) 2 MG tablet, Take 2 mg by mouth once daily., Disp: , Rfl:     ELIQUIS 5 mg Tab, Take 5 mg by mouth 2 (two) times daily., Disp: , Rfl:     meclizine (ANTIVERT) 25 mg tablet, Take 25 mg by mouth 3 (three) times daily., Disp: , Rfl:     metFORMIN (GLUCOPHAGE) 500 MG tablet, Take 500 mg by mouth once daily., Disp: , Rfl:     nebivoloL (BYSTOLIC) 5 MG Tab, Take 5 mg by mouth once daily., Disp: , Rfl:     ramipriL (ALTACE) 10 MG capsule, Take 10 mg by mouth once daily., Disp: , Rfl:     rosuvastatin (CRESTOR) 20 MG tablet, Take 20 mg by mouth nightly., Disp: , Rfl:     torsemide (DEMADEX) 20 MG Tab, Take 40 mg by mouth once daily., Disp: , Rfl:      Review of Systems:  ROS    Allergies:  Bee sting [allergen ext-venom-honey bee]     Medical History:   has a past medical history of A-fib, CKD (chronic kidney disease), Diabetes mellitus, HLD (hyperlipidemia), Hypertension, and Venous insufficiency of both lower extremities.    Surgical History:   has a past surgical history that includes Total knee replacement using computer navigation (Bilateral, 07/2019).    Family History:  family history includes Heart disease in his father and mother.    Social History:   reports that he has never smoked. He has never used smokeless tobacco. He reports that he does not currently use alcohol.    Physical Exam:  BP (!) 144/90 (BP Location: Left arm, Patient Position: Sitting, BP Method: Large (Manual))   Pulse (!) 48   Resp 16   Ht 5' 11" (1.803 m)   Wt 116.1 kg (255 " lb 15.3 oz)   BMI 35.70 kg/m²   GEN: No acute distress. Calm, comfortable  HENT: Normocephalic, atraumatic, moist mucous membranes  EYE: Anicteric sclera, non-injected.   CV: Non-diaphoretic.   RESP: Breathing comfortably. Chest expansion symmetric.  EXT: No clubbing, cyanosis.   SKIN: Warm, & dry to palpation. No visible rashes or lesions of exposed skin.   PSYCH: Pleasant mood and appropriate affect. Recent and remote memory intact.   GAIT: Poor balance  Neck Exam:       Inspection: No erythema, bruising. Forward head       Palpation: (+) TTP of bilateral upper cervical paraspinals      ROM:  Limitation in all planes, but pain mostly with lateral bending & rotation.      Provocative Maneuvers:  (-) Spurling's bilaterally  Lumbar Spine Exam:       Inspection: No erythema, bruising.       Palpation: Palpable, non-tender, mobile, large soft tissue mass in the left paraspinal region above the left iliac crest. (+) TTP of lumbar paraspinals bilaterally       ROM: Limited in flexion, extension, lateral bending.       (+) Facet loading bilaterally      (-) Straight Leg Raise bilaterally      (-) SADIQ bilaterally  Hip Exam:      Inspection: No gross deformity or apparent leg length discrepancy      Palpation: (+) TTP to bilateral greater trochanteric bursas, piriformis.       ROM: limitation in internal rotation, external rotation b/l  Neurologic Exam:     Alert. Speech is fluent and appropriate.     Strength: 4/5 in b/l hip flexion, otherwise 5/5 throughout bilateral upper & lower extremities     Sensation:  Grossly intact to light touch in bilateral upper & lower extremities     Reflexes: Absent in b/l patella, achilles, and 1+ in b/l biceps, brachioradialis, triceps     Tone: No abnormality appreciated in bilateral upper or lower extremities     (-) Michael bilaterally     No Clonus     Downgoing toes on plantar stimulation      Imaging:  - MRI Lumbar spine 6/20/22:      Labs:  BMP  No results found for: NA, K, CL,  CO2, BUN, CREATININE, CALCIUM, ANIONGAP, ESTGFRAFRICA, EGFRNONAA  No results found for: ALT, AST, GGT, ALKPHOS, BILITOT  No results found for: PLT    Assessment:  Adan Bermudez is a 77 y.o. male with the following diagnoses based on history, exam, and imaging:    Problem List Items Addressed This Visit          Orthopedic    Chronic low back pain    Relevant Orders    CT Lumbar Spine Without Contrast     Other Visit Diagnoses       Paraspinal soft tissue mass    -  Primary    Relevant Orders    CT Lumbar Spine Without Contrast    US Soft Tissue Lower Back    Chronic neck pain        Relevant Orders    MRI Cervical Spine Without Contrast    X-Ray Cervical Spine Complete 5 view    Ambulatory referral/consult to Physical/Occupational Therapy    Decreased range of motion of lumbar spine        Decreased range of motion of intervertebral discs of cervical spine        Relevant Orders    MRI Cervical Spine Without Contrast    X-Ray Cervical Spine Complete 5 view    Ambulatory referral/consult to Physical/Occupational Therapy    Impaired functional mobility, balance, gait, and endurance        Relevant Orders    MRI Cervical Spine Without Contrast    Cervicalgia        Relevant Orders    MRI Cervical Spine Without Contrast    X-Ray Cervical Spine Complete 5 view    Ambulatory referral/consult to Physical/Occupational Therapy            This is a pleasant 77 y.o. gentleman presenting with:     - Chronic neck: Appears to be facetogenic in the upper cervical spine  - Chronic bilateral low back pain: Pain appears primarily facetogenic. Moderate foraminal narrowing at L5-S1 on prior MRI, but no significant canal narrowing to clearly explain his leg symptoms though does seem like LSS.   - Soft tissue mass in left paraspinal region above iliac crest  - Comorbidities: Paroxysmal A-fib on eliquis. HTN. DM2. CKD.     Treatment Plan:   - PT/OT/HEP: Refer for further PT of neck pain. Cont HEP and transition from PT as has done  extensive PT. Discussed benefits of exercise for pain.   - Procedures: Schedule Bilateral C3-4, C4-5 diagnostic MBBs   - Consider bilateral L5 TF TANESHA  - Medications: No changes recommended at this time.  - Imaging: Reviewed.   - Order x-ray of C-spine and MRI of c-spine.   - Order CT and ultrasound of lumbar spine to assess mass.   - Consider thoracic MRI.   - Labs: Reviewed.    - Request outside lab records and any further [facility].    Follow Up: RTC - call with results of MBBs.     Amber Doyle M.D.  Interventional Pain Medicine / Physical Medicine & Rehabilitation    Disclaimer: This note was partly generated using dictation software which may occasionally result in transcription errors.

## 2022-09-07 NOTE — TELEPHONE ENCOUNTER
Clearance received from [facility] to hold Eliquis x3 days prior to procedure.    Unable to reach patient or leave message.

## 2022-09-09 ENCOUNTER — HOSPITAL ENCOUNTER (OUTPATIENT)
Dept: RADIOLOGY | Facility: HOSPITAL | Age: 77
Discharge: HOME | End: 2022-09-09
Attending: PHYSICAL MEDICINE & REHABILITATION
Payer: MEDICARE

## 2022-09-09 DIAGNOSIS — M54.50 CHRONIC BILATERAL LOW BACK PAIN WITHOUT SCIATICA: ICD-10-CM

## 2022-09-09 DIAGNOSIS — G89.29 CHRONIC BILATERAL LOW BACK PAIN WITHOUT SCIATICA: ICD-10-CM

## 2022-09-09 DIAGNOSIS — M54.2 CERVICALGIA: ICD-10-CM

## 2022-09-09 DIAGNOSIS — G89.29 CHRONIC LOW BACK PAIN, UNSPECIFIED BACK PAIN LATERALITY, UNSPECIFIED WHETHER SCIATICA PRESENT: Primary | ICD-10-CM

## 2022-09-09 DIAGNOSIS — G89.29 CHRONIC NECK PAIN: ICD-10-CM

## 2022-09-09 DIAGNOSIS — M53.82 DECREASED RANGE OF MOTION OF INTERVERTEBRAL DISCS OF CERVICAL SPINE: ICD-10-CM

## 2022-09-09 DIAGNOSIS — M54.2 CHRONIC NECK PAIN: ICD-10-CM

## 2022-09-09 DIAGNOSIS — Z74.09 IMPAIRED FUNCTIONAL MOBILITY, BALANCE, GAIT, AND ENDURANCE: ICD-10-CM

## 2022-09-09 DIAGNOSIS — M79.89 PARASPINAL SOFT TISSUE MASS: ICD-10-CM

## 2022-09-09 DIAGNOSIS — M54.50 CHRONIC LOW BACK PAIN, UNSPECIFIED BACK PAIN LATERALITY, UNSPECIFIED WHETHER SCIATICA PRESENT: Primary | ICD-10-CM

## 2022-09-09 PROCEDURE — 72141 MRI NECK SPINE W/O DYE: CPT | Mod: TC

## 2022-09-09 PROCEDURE — 72141 MRI NECK SPINE W/O DYE: CPT | Mod: 26,,, | Performed by: RADIOLOGY

## 2022-09-09 PROCEDURE — 76705 ECHO EXAM OF ABDOMEN: CPT | Mod: 26,,, | Performed by: RADIOLOGY

## 2022-09-09 PROCEDURE — 72141 MRI CERVICAL SPINE WITHOUT CONTRAST: ICD-10-PCS | Mod: 26,,, | Performed by: RADIOLOGY

## 2022-09-09 PROCEDURE — 72131 CT LUMBAR SPINE W/O DYE: CPT | Mod: TC

## 2022-09-09 PROCEDURE — 76705 US SOFT TISSUE LOWER BACK: ICD-10-PCS | Mod: 26,,, | Performed by: RADIOLOGY

## 2022-09-09 PROCEDURE — 72131 CT LUMBAR SPINE W/O DYE: CPT | Mod: 26,,, | Performed by: RADIOLOGY

## 2022-09-09 PROCEDURE — 76705 ECHO EXAM OF ABDOMEN: CPT | Mod: TC

## 2022-09-09 PROCEDURE — 72131 CT LUMBAR SPINE WITHOUT CONTRAST: ICD-10-PCS | Mod: 26,,, | Performed by: RADIOLOGY

## 2022-09-16 ENCOUNTER — HOSPITAL ENCOUNTER (OUTPATIENT)
Facility: HOSPITAL | Age: 77
Discharge: HOME | End: 2022-09-16
Attending: PHYSICAL MEDICINE & REHABILITATION | Admitting: PHYSICAL MEDICINE & REHABILITATION
Payer: MEDICARE

## 2022-09-16 ENCOUNTER — HOSPITAL ENCOUNTER (OUTPATIENT)
Dept: RADIOLOGY | Facility: HOSPITAL | Age: 77
Discharge: HOME | End: 2022-09-16
Attending: PHYSICAL MEDICINE & REHABILITATION
Payer: MEDICARE

## 2022-09-16 VITALS
SYSTOLIC BLOOD PRESSURE: 121 MMHG | RESPIRATION RATE: 18 BRPM | OXYGEN SATURATION: 95 % | HEART RATE: 55 BPM | DIASTOLIC BLOOD PRESSURE: 58 MMHG | TEMPERATURE: 97 F

## 2022-09-16 DIAGNOSIS — M47.812 CERVICAL SPONDYLOSIS: ICD-10-CM

## 2022-09-16 DIAGNOSIS — M54.12 CERVICAL RADICULOPATHY: ICD-10-CM

## 2022-09-16 DIAGNOSIS — M47.812 CERVICAL SPONDYLOSIS: Primary | ICD-10-CM

## 2022-09-16 DIAGNOSIS — G89.29 CHRONIC PAIN: ICD-10-CM

## 2022-09-16 LAB
POCT GLUCOSE: 109 MG/DL (ref 70–110)
POCT GLUCOSE: 118 MG/DL (ref 70–110)

## 2022-09-16 PROCEDURE — 64490 PR INJ DX/THER AGNT PARAVERT FACET JOINT,IMG GUIDE,CERV/THORAC, 1ST LEVEL: ICD-10-PCS | Mod: 50,KX,, | Performed by: PHYSICAL MEDICINE & REHABILITATION

## 2022-09-16 PROCEDURE — 25500020 PHARM REV CODE 255: Performed by: PHYSICAL MEDICINE & REHABILITATION

## 2022-09-16 PROCEDURE — 64491 INJ PARAVERT F JNT C/T 2 LEV: CPT | Mod: 50,KX,, | Performed by: PHYSICAL MEDICINE & REHABILITATION

## 2022-09-16 PROCEDURE — 64491 INJ PARAVERT F JNT C/T 2 LEV: CPT | Mod: 50 | Performed by: PHYSICAL MEDICINE & REHABILITATION

## 2022-09-16 PROCEDURE — 64491 PR INJ DX/THER AGNT PARAVERT FACET JOINT,IMG GUIDE,CERV/THORAC, 2ND LEVEL: ICD-10-PCS | Mod: 50,KX,, | Performed by: PHYSICAL MEDICINE & REHABILITATION

## 2022-09-16 PROCEDURE — 25000003 PHARM REV CODE 250: Performed by: PHYSICAL MEDICINE & REHABILITATION

## 2022-09-16 PROCEDURE — 64490 INJ PARAVERT F JNT C/T 1 LEV: CPT | Mod: 50,KX,, | Performed by: PHYSICAL MEDICINE & REHABILITATION

## 2022-09-16 PROCEDURE — 64490 INJ PARAVERT F JNT C/T 1 LEV: CPT | Mod: 50 | Performed by: PHYSICAL MEDICINE & REHABILITATION

## 2022-09-16 PROCEDURE — 82962 GLUCOSE BLOOD TEST: CPT | Performed by: PHYSICAL MEDICINE & REHABILITATION

## 2022-09-16 RX ORDER — LIDOCAINE HYDROCHLORIDE 20 MG/ML
INJECTION, SOLUTION EPIDURAL; INFILTRATION; INTRACAUDAL; PERINEURAL
Status: DISCONTINUED
Start: 2022-09-16 | End: 2022-09-16 | Stop reason: HOSPADM

## 2022-09-16 RX ORDER — LIDOCAINE HYDROCHLORIDE 10 MG/ML
INJECTION INFILTRATION; PERINEURAL
Status: DISCONTINUED | OUTPATIENT
Start: 2022-09-16 | End: 2022-09-16 | Stop reason: HOSPADM

## 2022-09-16 RX ORDER — LIDOCAINE HYDROCHLORIDE 10 MG/ML
INJECTION INFILTRATION; PERINEURAL
Status: DISCONTINUED
Start: 2022-09-16 | End: 2022-09-16 | Stop reason: HOSPADM

## 2022-09-16 RX ORDER — LIDOCAINE HYDROCHLORIDE 20 MG/ML
INJECTION, SOLUTION INFILTRATION; PERINEURAL
Status: DISCONTINUED | OUTPATIENT
Start: 2022-09-16 | End: 2022-09-16 | Stop reason: HOSPADM

## 2022-09-16 NOTE — DISCHARGE SUMMARY
[facility]  Discharge Note  Short Stay     Admit Date: 9/16/2022    Discharge Date: 9/16/2022     Attending Physician: Amber Doyle M.D.    Diagnoses:  Active Hospital Problems    Diagnosis  POA    *Cervical spondylosis [M47.812]  Yes      Resolved Hospital Problems   No resolved problems to display.     Discharged Condition: Good     Hospital Course: Patient was admitted for an outpatient interventional pain management procedure and tolerated the procedure well with no complications.     Final Diagnoses: Same as principal problem.     Disposition: Home or Self Care     Follow up/Patient Instructions:   Follow-up in 1-2 weeks unless otherwise instructed. May return sooner as needed.       Reconciled Medications:     Medication List        CONTINUE taking these medications      amiodarone 200 MG Tab  Commonly known as: PACERONE  Take 200 mg by mouth 2 (two) times daily.     doxazosin 2 MG tablet  Commonly known as: CARDURA  Take 2 mg by mouth once daily.     ELIQUIS 5 mg Tab  Generic drug: apixaban  Take 5 mg by mouth 2 (two) times daily.     meclizine 25 mg tablet  Commonly known as: ANTIVERT  Take 25 mg by mouth 3 (three) times daily.     metFORMIN 500 MG tablet  Commonly known as: GLUCOPHAGE  Take 500 mg by mouth once daily.     nebivoloL 5 MG Tab  Commonly known as: BYSTOLIC  Take 5 mg by mouth once daily.     ramipriL 10 MG capsule  Commonly known as: ALTACE  Take 10 mg by mouth once daily.     rosuvastatin 20 MG tablet  Commonly known as: CRESTOR  Take 20 mg by mouth nightly.     torsemide 20 MG Tab  Commonly known as: DEMADEX  Take 40 mg by mouth once daily.             Discharge Procedure Orders (must include Diet, Follow-up, Activity)   Ice to affected area   Order Comments: 20 minutes of ice or until area numb to the touch if area is sore 2-3 times per day as needed     No driving until:   Order Comments: Until following day     No dressing needed     Notify your health care provider if you  experience any of the following:  temperature >100.4     Notify your health care provider if you experience any of the following:  persistent nausea and vomiting or diarrhea     Notify your health care provider if you experience any of the following:  severe uncontrolled pain     Notify your health care provider if you experience any of the following:  redness, tenderness, or signs of infection (pain, swelling, redness, odor or green/yellow discharge around incision site)     Notify your health care provider if you experience any of the following:  difficulty breathing or increased cough     Notify your health care provider if you experience any of the following:  severe persistent headache     Notify your health care provider if you experience any of the following:  worsening rash     Notify your health care provider if you experience any of the following:  persistent dizziness, light-headedness, or visual disturbances     Notify your health care provider if you experience any of the following:  increased confusion or weakness     Shower on day dressing removed (No bath)       Amber Doyle M.D.  Interventional Pain Medicine / Physical Medicine & Rehabilitation

## 2022-09-16 NOTE — DISCHARGE INSTRUCTIONS
DIET: You may resume your normal diet today.    BATHING: You may resume your normal bathing.          You may shower, no hot water directly on site for 24 hours.    DRESSING: You may remove your bandage today.    ACTIVITY LEVEL: You may resume your normal activities 24 hours after your  procedure.    If you have received sedation or an anesthetic, you may feel sleepy                                                                          for several hours. Rest until you are more awake. Gradually resume your normal activities tomorrow.    If you have received sedation or an anesthetic, do not drive or operate heavy machinery for at least 24 hours.    MEDICATION: You may resume your normal medications today.    You will receive instructions for any pain prescriptions. Pain medications should be taken only as directed.    SPECIAL INSTRUCTIONS: No heat to the injection site for 24 hours including: bath or shower, heating pad, moist heat, hot tubs.    Use ice pack to injection site for any pain or discomfort. Apply ice pack to 20 minutes then remove for 20 minutes before re-applying to site.    WHEN TO CALL DOCTOR: Redness or swelling around injection site    Fever of 101F    Drainage (pus) from the injection site    For any continuous bleeding (some dried blood over the incision is normal).    FOLLOW UP: Follow up phone call will be made by office.    FOR EMERGENCIES: If any unusual problems or difficulties occur during clinic hours, call [phone] or [phone].

## 2022-09-16 NOTE — OP NOTE
Cervical Medial Branch Block Under Fluoroscopic Guidance:  I have reviewed the patient's medications, allergies and relevant histories prior to the procedure and no contraindications have been identified. The risks, benefits and alternatives to the procedure were discussed with the patient, and all questions regarding the procedure were answered to the patient's satisfaction. I personally obtained Adan's consent prior to the start of the procedure and the signed consent can be found in the patient's chart.                                                         Time-out was taken to identify patient, procedure, laterality, and allergies prior to starting the procedure.       Date of Service: 09/16/2022  Procedure: Bilateral C3-4 and C4-5 medial branch diagnostic blocks under fluoroscopic guidance  Pre-Operative Diagnosis: Cervical Spondylosis  Post-Operative Diagnosis: Cervical Spondylosis    Physician: Amber Doyle M.D.  Assistants: None    Medications Injected: Preservative free Lidocaine 2% 1 mL at each level.  Local Anesthetic: Xylocaine 1% 10 mL.   Sedation Medications: None    Procedural Technique:   Laying in a prone position, the patient was prepped and draped in the usual sterile fashion using ChloraPrep and fenestrated drape.  The area was determined under fluoroscopic guidance.  Local anesthetic was utilized to anesthetize the skin and subcutaneous tissues in the area using a 25-gauge 1.5 inch needle. A 22-gauge 3.5 inch needle was introduced to the anatomic local of the Bilateral C3, C4, and C5 medial branches over the above stated cervical facet joints at the lateral mass utilizing live fluoroscopy. After negative aspiration for blood, medication was then injected slowly. The needle was removed and bandage applied to the area.    Estimated Blood Loss:  None.  Complications:  None.     Disposition: The patient was taken to the recovery area and monitored. The patient was supplied with written discharge  instructions for the procedure. If durable relief provided, we can repeat as needed. If good relief, but short term, we may schedule patient for radiofrequency ablation. The patient was discharged in a stable condition.    Follow-Up: We will see the patient back in 1-2 weeks or the patient may follow-up with referring provider if direct referral.

## 2022-09-20 DIAGNOSIS — R22.9 LOCALIZED SWELLING, MASS AND LUMP, UNSPECIFIED: ICD-10-CM

## 2022-09-20 DIAGNOSIS — M79.89 PARASPINAL SOFT TISSUE MASS: Primary | ICD-10-CM

## 2022-09-27 ENCOUNTER — OFFICE VISIT (OUTPATIENT)
Dept: NEUROLOGY | Facility: CLINIC | Age: 77
End: 2022-09-27
Payer: MEDICARE

## 2022-09-27 ENCOUNTER — HOSPITAL ENCOUNTER (OUTPATIENT)
Dept: RADIOLOGY | Facility: HOSPITAL | Age: 77
Discharge: HOME | End: 2022-09-27
Attending: PHYSICAL MEDICINE & REHABILITATION
Payer: MEDICARE

## 2022-09-27 VITALS
HEART RATE: 50 BPM | DIASTOLIC BLOOD PRESSURE: 84 MMHG | WEIGHT: 253.5 LBS | HEIGHT: 71 IN | RESPIRATION RATE: 16 BRPM | SYSTOLIC BLOOD PRESSURE: 132 MMHG | BODY MASS INDEX: 35.49 KG/M2

## 2022-09-27 DIAGNOSIS — G62.9 POLYNEUROPATHY: ICD-10-CM

## 2022-09-27 DIAGNOSIS — M79.89 PARASPINAL SOFT TISSUE MASS: ICD-10-CM

## 2022-09-27 DIAGNOSIS — M54.16 LUMBAR RADICULOPATHY: ICD-10-CM

## 2022-09-27 DIAGNOSIS — R42 DIZZINESS: ICD-10-CM

## 2022-09-27 DIAGNOSIS — E11.9 TYPE 2 DIABETES MELLITUS WITHOUT COMPLICATION, WITHOUT LONG-TERM CURRENT USE OF INSULIN: ICD-10-CM

## 2022-09-27 DIAGNOSIS — M54.12 CERVICAL RADICULOPATHY: ICD-10-CM

## 2022-09-27 DIAGNOSIS — R22.9 LOCALIZED SWELLING, MASS AND LUMP, UNSPECIFIED: ICD-10-CM

## 2022-09-27 DIAGNOSIS — R26.89 IMBALANCE: ICD-10-CM

## 2022-09-27 DIAGNOSIS — R29.6 RECURRENT FALLS: ICD-10-CM

## 2022-09-27 DIAGNOSIS — R47.81 SLURRED SPEECH: Primary | ICD-10-CM

## 2022-09-27 DIAGNOSIS — I48.91 ATRIAL FIBRILLATION, UNSPECIFIED TYPE: ICD-10-CM

## 2022-09-27 DIAGNOSIS — R42 DIZZINESS AND GIDDINESS: ICD-10-CM

## 2022-09-27 PROCEDURE — 3079F PR MOST RECENT DIASTOLIC BLOOD PRESSURE 80-89 MM HG: ICD-10-PCS | Mod: CPTII,S$GLB,, | Performed by: PSYCHIATRY & NEUROLOGY

## 2022-09-27 PROCEDURE — 3075F SYST BP GE 130 - 139MM HG: CPT | Mod: CPTII,S$GLB,, | Performed by: PSYCHIATRY & NEUROLOGY

## 2022-09-27 PROCEDURE — 1126F PR PAIN SEVERITY QUANTIFIED, NO PAIN PRESENT: ICD-10-PCS | Mod: CPTII,S$GLB,, | Performed by: PSYCHIATRY & NEUROLOGY

## 2022-09-27 PROCEDURE — 3075F PR MOST RECENT SYSTOLIC BLOOD PRESS GE 130-139MM HG: ICD-10-PCS | Mod: CPTII,S$GLB,, | Performed by: PSYCHIATRY & NEUROLOGY

## 2022-09-27 PROCEDURE — 99214 OFFICE O/P EST MOD 30 MIN: CPT | Mod: S$GLB,,, | Performed by: PSYCHIATRY & NEUROLOGY

## 2022-09-27 PROCEDURE — 3079F DIAST BP 80-89 MM HG: CPT | Mod: CPTII,S$GLB,, | Performed by: PSYCHIATRY & NEUROLOGY

## 2022-09-27 PROCEDURE — 1159F PR MEDICATION LIST DOCUMENTED IN MEDICAL RECORD: ICD-10-PCS | Mod: CPTII,S$GLB,, | Performed by: PSYCHIATRY & NEUROLOGY

## 2022-09-27 PROCEDURE — 1126F AMNT PAIN NOTED NONE PRSNT: CPT | Mod: CPTII,S$GLB,, | Performed by: PSYCHIATRY & NEUROLOGY

## 2022-09-27 PROCEDURE — 1160F RVW MEDS BY RX/DR IN RCRD: CPT | Mod: CPTII,S$GLB,, | Performed by: PSYCHIATRY & NEUROLOGY

## 2022-09-27 PROCEDURE — 25500020 PHARM REV CODE 255: Performed by: PHYSICAL MEDICINE & REHABILITATION

## 2022-09-27 PROCEDURE — 99999 PR PBB SHADOW E&M-EST. PATIENT-LVL III: CPT | Mod: PBBFAC,,, | Performed by: PSYCHIATRY & NEUROLOGY

## 2022-09-27 PROCEDURE — 72158 MRI LUMBAR SPINE W/O & W/DYE: CPT | Mod: TC

## 2022-09-27 PROCEDURE — A9585 GADOBUTROL INJECTION: HCPCS | Performed by: PHYSICAL MEDICINE & REHABILITATION

## 2022-09-27 PROCEDURE — 1159F MED LIST DOCD IN RCRD: CPT | Mod: CPTII,S$GLB,, | Performed by: PSYCHIATRY & NEUROLOGY

## 2022-09-27 PROCEDURE — 99999 PR PBB SHADOW E&M-EST. PATIENT-LVL III: ICD-10-PCS | Mod: PBBFAC,,, | Performed by: PSYCHIATRY & NEUROLOGY

## 2022-09-27 PROCEDURE — 99214 PR OFFICE/OUTPT VISIT, EST, LEVL IV, 30-39 MIN: ICD-10-PCS | Mod: S$GLB,,, | Performed by: PSYCHIATRY & NEUROLOGY

## 2022-09-27 PROCEDURE — 1160F PR REVIEW ALL MEDS BY PRESCRIBER/CLIN PHARMACIST DOCUMENTED: ICD-10-PCS | Mod: CPTII,S$GLB,, | Performed by: PSYCHIATRY & NEUROLOGY

## 2022-09-27 RX ORDER — EPINEPHRINE 0.3 MG/.3ML
INJECTION SUBCUTANEOUS
COMMUNITY
Start: 2022-07-12

## 2022-09-27 RX ORDER — GADOBUTROL 604.72 MG/ML
10 INJECTION INTRAVENOUS
Status: COMPLETED | OUTPATIENT
Start: 2022-09-27 | End: 2022-09-27

## 2022-09-27 RX ORDER — METOPROLOL SUCCINATE 25 MG/1
25 TABLET, EXTENDED RELEASE ORAL DAILY
COMMUNITY
Start: 2022-05-17 | End: 2022-11-18

## 2022-09-27 RX ADMIN — GADOBUTROL 10 ML: 604.72 INJECTION INTRAVENOUS at 09:09

## 2022-09-27 NOTE — PROGRESS NOTES
HPI: Adan Bermudez is a 77 y.o. male here for evaluation of imbalance      Since the last visit with me, the patient has been seeing NP, Yoselin Bailey, in Neurology in this clinic    Wife noted he was having more imbalance and falls at that time        MRI L spine was ordered and he was recommended for PT    He is now seeing pain management, Dr. Doyle for his back pain and his neck pain and pending updated MRI C and L spine. Dr Doyle is also working up a left paraspinal region soft tissue mass    PT is ongoing and he still feel off balance and not vertigo    His wife usually walks behind him with a walker    Falls continue (has had 3-4 falls in the past 6 months)    All occurred in transition from walker use    Numbness in the feet is tolerable.    Speech is still slurred and this seems worse at times. Speech is slow and slurred    He tried ST without relief      Not light headed and no syncope.     Pulse50    Memory is good    No tremor is noted but he is a bit restless in the right leg at times for years.     He states his DM2 is well controlled    Weight is down 19 pounds with better diet        Patient has a long history speech disorder. Feels for years that he sometimes slurred his speech (noted prior to MRI brain)      Review of Systems   Constitutional:  Negative for fever.   HENT:  Negative for nosebleeds.    Eyes:  Negative for double vision.   Respiratory:  Positive for shortness of breath.         Chronic SOB and states stress test was normal and cardiology recommended weight loss   Cardiovascular:  Negative for leg swelling.   Gastrointestinal:  Negative for blood in stool.   Genitourinary:  Negative for hematuria.   Musculoskeletal:  Positive for falls.   Skin:  Negative for rash.   Neurological:  Positive for sensory change.       I have reviewed all of this patient's past medical and surgical histories as well as family and social histories and active allergies and medications as documented  in the electronic medical record.        Exam:  Gen Appearance, well developed/nourished in no apparent distress  CV: 2+ distal pulses with no edema or swelling  Neuro:  MS: Awake, alert, oriented to place, person, time, situation. Sustains attention. Recent/remote memory intact, Language is full to spontaneous speech/repetition/naming/comprehension. Fund of Knowledge is full  CN: Optic discs are flat with normal vasculature, PERRL, Extraoccular movements and visual fields are full. Normal facial sensation and strength, Hearing symmetric, Tongue and Palate are midline and strong. Shoulder Shrug symmetric and strong.  Motor: Normal bulk, tone, no abnormal movements. 5/5 strength bilateral upper/lower extremities with 1+ reflexes and no clonus  Moves right leg but has control to stop this.  Sensory: symmetric to light touch, pain, temp, and vibration but reduced in feet to all modalities, Romberg positive  Cerebellar: Finger-nose,Heal-shin, Rapid alternating movements intact  Gait: Normal stance, no ataxia and uses walker well and wife walks behind him  Without a walker, he has a clear sensory ataxia    Imagin EMG/NCS of the arms: C5 and C6 more than C7 radicular disease and right more than left CTS   EMG/NCS of the legs: sensory and motor polyneuropathy and lumbar radicular changes      MRI L spine 2022: old mild compression fracture in the lower T spine, mild bulging disc    2022 MRI C spine: No severe spinal stenosis    Labs:  CMP, CBC, unremarkable   TSH, SPEP, PEE, RPR normal    Assessment/Plan: Adan Bermudez is a 77 y.o. male with imbalance for years. Found to have polyneuropathy by EMG/NCS in  with Dr Stevens. He continues with poor balance and frequent falls.   I recommend:     1. Per outside/ [facility] records  - MRI brain showed mild atrophy and white matter changes  - EMG/NCS of the legs: sensory and motor polyneuropathy and lumbar radicular change. Has some back pain  chronically which is not radicular and this has been present for years.Tolerates this well. Consider MRI L spine is worse  -2020 EMG/NCS of the arms: C5 and C6 more than C7 radicular disease and right more than left CTS  -Per review of 2020 records from outside neurology ([facility]) Compression fractures (chronic) noted by MRI T spine and MRI C spine showed multilevel NF narrowing and disc bulging  without herniation  -Tolerates all spinal pain well. No symptoms from CTS at this time.   -Patient has a long history speech disorder. Feels for years that he sometimes mildly slurs his speech (noted prior to MRI brain). And this is slowly worsening and did not respond to ST  -Update MRI Brain to look for cause. ?neurodegenerative disorder. Not a clear tremor in the right leg, but maybe some restless movement? LE parkinsonism. Monitor over time otherwise    2. Continue supplementation for low normal B12 levels.  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 Goal A1C less than 7  -Refer to PT PRN. Currently has chronic frequent falls.   -urged him to use a walker at all times. Has shower chair, handles    3. 22022 MRI L-spine showed no severe stenosis. This was done given his recurrent falls and gait issues with chronic lumbar pain.     -He is now seeing pain management, Dr. Doyle for his back pain and his neck pain and pending updated  L spine. Dr Doyle is also working up a left paraspinal region soft tissue mass  2022 MRI C spine: no severe stenosis     -His gait imbalance may be a separate issue from his vertiginous type complaints, as he has chronic lumbar pain, intrinsic issues of the right knee, and polyneuropathy, all of which, can contribute to gait imbalance.     3. Dizziness saw ENT prior and currently  -2019 [facility] suggested a central process  -recommended to have PT PRN (ENT suggested the same)    4. Bradycardia/ light headedness noted prior/ and again noted today/  asymptomatic.  Also has COLVIN followed by cardiology  -on NOAC for afib  -Urged him to continue reduce obesity via reduction of fried foods, rice and bread.     RTC 6 months

## 2022-09-30 ENCOUNTER — HOSPITAL ENCOUNTER (OUTPATIENT)
Dept: RADIOLOGY | Facility: HOSPITAL | Age: 77
Discharge: HOME | End: 2022-09-30
Attending: PSYCHIATRY & NEUROLOGY
Payer: MEDICARE

## 2022-09-30 DIAGNOSIS — R42 DIZZINESS AND GIDDINESS: ICD-10-CM

## 2022-09-30 DIAGNOSIS — R42 DIZZINESS: ICD-10-CM

## 2022-09-30 DIAGNOSIS — R47.81 SLURRED SPEECH: ICD-10-CM

## 2022-09-30 DIAGNOSIS — R26.89 IMBALANCE: ICD-10-CM

## 2022-09-30 PROCEDURE — 70551 MRI BRAIN STEM W/O DYE: CPT | Mod: 26,,, | Performed by: RADIOLOGY

## 2022-09-30 PROCEDURE — 70551 MRI BRAIN STEM W/O DYE: CPT | Mod: TC

## 2022-09-30 PROCEDURE — 70551 MRI BRAIN WITHOUT CONTRAST: ICD-10-PCS | Mod: 26,,, | Performed by: RADIOLOGY

## 2022-10-11 ENCOUNTER — OFFICE VISIT (OUTPATIENT)
Dept: PAIN MEDICINE | Facility: CLINIC | Age: 77
End: 2022-10-11
Payer: MEDICARE

## 2022-10-11 VITALS
HEIGHT: 71 IN | SYSTOLIC BLOOD PRESSURE: 132 MMHG | BODY MASS INDEX: 35.36 KG/M2 | RESPIRATION RATE: 16 BRPM | DIASTOLIC BLOOD PRESSURE: 84 MMHG

## 2022-10-11 DIAGNOSIS — M54.2 CHRONIC NECK PAIN: ICD-10-CM

## 2022-10-11 DIAGNOSIS — M54.50 CHRONIC BILATERAL LOW BACK PAIN WITHOUT SCIATICA: ICD-10-CM

## 2022-10-11 DIAGNOSIS — G89.29 CHRONIC NECK PAIN: ICD-10-CM

## 2022-10-11 DIAGNOSIS — E11.42 TYPE 2 DIABETES MELLITUS WITH DIABETIC POLYNEUROPATHY, WITHOUT LONG-TERM CURRENT USE OF INSULIN: Primary | ICD-10-CM

## 2022-10-11 DIAGNOSIS — I48.91 ATRIAL FIBRILLATION, UNSPECIFIED TYPE: ICD-10-CM

## 2022-10-11 DIAGNOSIS — G89.29 CHRONIC BILATERAL LOW BACK PAIN WITHOUT SCIATICA: ICD-10-CM

## 2022-10-11 DIAGNOSIS — Z74.09 IMPAIRED FUNCTIONAL MOBILITY, BALANCE, GAIT, AND ENDURANCE: ICD-10-CM

## 2022-10-11 PROCEDURE — 3288F FALL RISK ASSESSMENT DOCD: CPT | Mod: CPTII,S$GLB,, | Performed by: PHYSICAL MEDICINE & REHABILITATION

## 2022-10-11 PROCEDURE — 1125F PR PAIN SEVERITY QUANTIFIED, PAIN PRESENT: ICD-10-PCS | Mod: CPTII,S$GLB,, | Performed by: PHYSICAL MEDICINE & REHABILITATION

## 2022-10-11 PROCEDURE — 1159F MED LIST DOCD IN RCRD: CPT | Mod: CPTII,S$GLB,, | Performed by: PHYSICAL MEDICINE & REHABILITATION

## 2022-10-11 PROCEDURE — 1160F PR REVIEW ALL MEDS BY PRESCRIBER/CLIN PHARMACIST DOCUMENTED: ICD-10-PCS | Mod: CPTII,S$GLB,, | Performed by: PHYSICAL MEDICINE & REHABILITATION

## 2022-10-11 PROCEDURE — 3075F PR MOST RECENT SYSTOLIC BLOOD PRESS GE 130-139MM HG: ICD-10-PCS | Mod: CPTII,S$GLB,, | Performed by: PHYSICAL MEDICINE & REHABILITATION

## 2022-10-11 PROCEDURE — 1160F RVW MEDS BY RX/DR IN RCRD: CPT | Mod: CPTII,S$GLB,, | Performed by: PHYSICAL MEDICINE & REHABILITATION

## 2022-10-11 PROCEDURE — 1100F PR PT FALLS ASSESS DOC 2+ FALLS/FALL W/INJURY/YR: ICD-10-PCS | Mod: CPTII,S$GLB,, | Performed by: PHYSICAL MEDICINE & REHABILITATION

## 2022-10-11 PROCEDURE — 1100F PTFALLS ASSESS-DOCD GE2>/YR: CPT | Mod: CPTII,S$GLB,, | Performed by: PHYSICAL MEDICINE & REHABILITATION

## 2022-10-11 PROCEDURE — 99213 OFFICE O/P EST LOW 20 MIN: CPT | Mod: S$GLB,,, | Performed by: PHYSICAL MEDICINE & REHABILITATION

## 2022-10-11 PROCEDURE — 1159F PR MEDICATION LIST DOCUMENTED IN MEDICAL RECORD: ICD-10-PCS | Mod: CPTII,S$GLB,, | Performed by: PHYSICAL MEDICINE & REHABILITATION

## 2022-10-11 PROCEDURE — 99999 PR PBB SHADOW E&M-EST. PATIENT-LVL IV: ICD-10-PCS | Mod: PBBFAC,,, | Performed by: PHYSICAL MEDICINE & REHABILITATION

## 2022-10-11 PROCEDURE — 1125F AMNT PAIN NOTED PAIN PRSNT: CPT | Mod: CPTII,S$GLB,, | Performed by: PHYSICAL MEDICINE & REHABILITATION

## 2022-10-11 PROCEDURE — 3079F DIAST BP 80-89 MM HG: CPT | Mod: CPTII,S$GLB,, | Performed by: PHYSICAL MEDICINE & REHABILITATION

## 2022-10-11 PROCEDURE — 99213 PR OFFICE/OUTPT VISIT, EST, LEVL III, 20-29 MIN: ICD-10-PCS | Mod: S$GLB,,, | Performed by: PHYSICAL MEDICINE & REHABILITATION

## 2022-10-11 PROCEDURE — 3075F SYST BP GE 130 - 139MM HG: CPT | Mod: CPTII,S$GLB,, | Performed by: PHYSICAL MEDICINE & REHABILITATION

## 2022-10-11 PROCEDURE — 99999 PR PBB SHADOW E&M-EST. PATIENT-LVL IV: CPT | Mod: PBBFAC,,, | Performed by: PHYSICAL MEDICINE & REHABILITATION

## 2022-10-11 PROCEDURE — 3079F PR MOST RECENT DIASTOLIC BLOOD PRESSURE 80-89 MM HG: ICD-10-PCS | Mod: CPTII,S$GLB,, | Performed by: PHYSICAL MEDICINE & REHABILITATION

## 2022-10-11 PROCEDURE — 3288F PR FALLS RISK ASSESSMENT DOCUMENTED: ICD-10-PCS | Mod: CPTII,S$GLB,, | Performed by: PHYSICAL MEDICINE & REHABILITATION

## 2022-10-11 NOTE — PROGRESS NOTES
[facility]      Chief Complaint:   Chief Complaint   Patient presents with    Neck Pain       History of Present Illness: Adan Bermudez is a 77 y.o. male referred by Dr. Gonzalez Guevara for LBP.  He actually notes more neck pain than back pain today and would like to talk about that.     Low Back Pain:  Onset: 2 years ago around the time he had a knee replacement  Location: bilateral low back  Radiation: no radiation down legs, but legs feel heavy and weak with standing  Timing: intermittent, pain occurs when he wakes in the morning and when he stands too long  Quality: Aching  Exacerbating Factors: standing and walking, first thing in the morning  Alleviating Factors: rest  Associated Symptoms: He feels weakness in both legs, unstable on his feet. He denies night fever/night sweats, urinary incontinence, bowel incontinence, significant weight loss, and loss of sensations      Neck pain has been present 6-7 years. Denies traumatic onset. Pain is localized to the bilateral upper cervical paraspinal regions with no radiation down the arms. Pain is described as unbearable. The pain is intermittent and aggravated by certain head positions, turning head. The pain is alleviated by tylenol. He denies weakness or numbness in the arms. Denies changes in bowel or bladder function. He has noticed changes in hand writing. He denies difficulty with buttons He has had changes in gait. Denies recent fevers or infections. Denies unexplained weight loss.    He has been in PT for nearly 2 years for vertigo, impaired gait.    Severity: Currently: 3 /10   Typical Range: 3 -5/10     Exacerbation: 5/10     Interval History (10/11/2022):  Adan Bermudez returns today for follow up.  At the last clinic visit, ordered imaging, scheduled MBBs,     B/L C3-5 MBBs provided temporary relief, but he is not able to quantify this.    Currently, the neck and back pain is stable.  He denies any significant changes in the neck or  the back pain.  States the back pain is currently worse than the neck pain.    He states his primary issue is still nausea, shortness of breath with exertion, dizziness, trouble walking, and trouble with balance.  His pain is not his primary issue and he would rather focus on those other problems.    Current Pain Scales:  Current: 4/10                 Pain Disability Index  Family/Home Responsibilities:: 10  Recreation:: 10  Social Activity:: 3  Occupation:: 10  Sexual Behavior:: 10  Self Care:: 8  Life-Support Activities:: 2  Pain Disability Index (PDI): 53    Previous Interventions:  - 9/16/22: B/L C3-5 MBBs w/ relief, but not able to quantify.     Previous Therapies:  PT/OT: yes   Relevant Surgery: no   Previous Medications:   - NSAIDS: Tylenol.   - Muscle Relaxants:    - TCAs:   - SNRIs:   - Topicals:   - Anticonvulsants:    - Opioids:     Current Pain Medications:  Tylenol     Blood Thinners: Eliquis    Full Medication List:    Current Outpatient Medications:     amiodarone (PACERONE) 200 MG Tab, Take 200 mg by mouth 2 (two) times daily., Disp: , Rfl:     doxazosin (CARDURA) 2 MG tablet, Take 2 mg by mouth once daily., Disp: , Rfl:     ELIQUIS 5 mg Tab, Take 5 mg by mouth 2 (two) times daily., Disp: , Rfl:     EPINEPHrine (EPIPEN) 0.3 mg/0.3 mL AtIn, INJECT AS DIRECTED, Disp: , Rfl:     metFORMIN (GLUCOPHAGE) 500 MG tablet, Take 500 mg by mouth once daily., Disp: , Rfl:     metoprolol succinate (TOPROL-XL) 25 MG 24 hr tablet, Take 25 mg by mouth once daily., Disp: , Rfl:     nebivoloL (BYSTOLIC) 5 MG Tab, Take 5 mg by mouth once daily., Disp: , Rfl:     ramipriL (ALTACE) 10 MG capsule, Take 10 mg by mouth once daily., Disp: , Rfl:     rosuvastatin (CRESTOR) 20 MG tablet, Take 20 mg by mouth nightly., Disp: , Rfl:     torsemide (DEMADEX) 20 MG Tab, Take 40 mg by mouth once daily., Disp: , Rfl:      Review of Systems:  ROS    Allergies:  Bee sting [allergen ext-venom-honey bee]     Medical History:   has a past  "medical history of A-fib, CKD (chronic kidney disease), Diabetes mellitus, HLD (hyperlipidemia), Hypertension, and Venous insufficiency of both lower extremities.    Surgical History:   has a past surgical history that includes Total knee replacement using computer navigation (Bilateral, 07/2019) and Injection of anesthetic agent around medial branch nerves innervating cervical facet joint (Bilateral, 9/16/2022).    Family History:  family history includes Heart disease in his father and mother.    Social History:   reports that he has never smoked. He has never used smokeless tobacco. He reports that he does not currently use alcohol.    Physical Exam:  /84   Resp 16   Ht 5' 11" (1.803 m)   BMI 35.36 kg/m²   GEN: No acute distress. Calm, comfortable  HENT: Normocephalic, atraumatic, moist mucous membranes  EYE: Anicteric sclera, non-injected.   CV: Non-diaphoretic.   RESP: Breathing comfortably. Chest expansion symmetric.  EXT: No clubbing, cyanosis.   SKIN: Warm, & dry to palpation. No visible rashes or lesions of exposed skin.   PSYCH: Pleasant mood and appropriate affect. Recent and remote memory intact.   GAIT: Poor balance  Neck Exam:       Inspection: No erythema, bruising. Forward head       Palpation: (+) TTP of bilateral upper cervical paraspinals      ROM:  Limitation in all planes, but pain mostly with lateral bending & rotation.      Provocative Maneuvers:  (-) Spurling's bilaterally  Lumbar Spine Exam:       Inspection: No erythema, bruising.       Palpation: Palpable, non-tender, mobile, large soft tissue mass in the left paraspinal region above the left iliac crest. (+) TTP of lumbar paraspinals bilaterally       ROM: Limited in flexion, extension, lateral bending.       (+) Facet loading bilaterally      (-) Straight Leg Raise bilaterally      (-) SADIQ bilaterally  Hip Exam:      Inspection: No gross deformity or apparent leg length discrepancy      Palpation: (+) TTP to bilateral greater " trochanteric bursas, piriformis.       ROM: limitation in internal rotation, external rotation b/l  Neurologic Exam:     Alert. Speech is fluent and appropriate.     Strength: 4/5 in b/l hip flexion, otherwise 5/5 throughout bilateral upper & lower extremities     Sensation:  Grossly intact to light touch in bilateral upper & lower extremities     Reflexes: Absent in b/l patella, achilles, and 1+ in b/l biceps, brachioradialis, triceps     Tone: No abnormality appreciated in bilateral upper or lower extremities     (-) Michael bilaterally     No Clonus     Downgoing toes on plantar stimulation      Imaging:  - MRI Lumbar spine w/ w/o contrast 9/27/22:  Imaged distal cord is normal in size and signal.  Conus terminates at the T12-L1 level.  Vertebral bodies are normal in height and alignment.  No compression deformities.  No marrow edema.  Superior endplate Schmorl's node at the L3 level measuring approximately 1.2 cm in diameter.  Degenerative related endplate signal changes at all levels to some extent with moderate diffuse marginal osteophytes present.     T11-T12 and T12-L1 discs demonstrate calcification.     L1-L2: Slight loss of disc space height with disc desiccation, but no significant herniation.  No spinal canal or foraminal narrowing.     L2-L3: Loss of disc space height with disc desiccation and minimal broad-based disc osteophyte resulting in trace indentation on the anterior thecal sac, but overall maintained thecal sac AP diameter which measures 1.1 cm.  Minimal bilateral foraminal narrowing.     L3-L4: Loss of disc space height with disc desiccation and mild broad-based bulge along with bilateral facet arthropathy.  Also component of hypertrophy of the posterior epidural fat.  Mild circumferential narrowing of the spinal canal at this level AP diameter of the thecal sac measuring 9.5 mm.  Mild right foraminal narrowing and moderate left foraminal narrowing with close approximation to the exiting  left-sided nerve root.     L4-L5: Mild loss of disc space height with disc desiccation and mild broad-based disc bulge along with bilateral facet arthropathy and thickening of the posterior epidural fat.  Mild narrowing of the spinal canal with thecal sac AP diameter measuring 8.5 mm.  Minimal left and mild right foraminal narrowing.     L5-S1: Loss of disc space height with disc desiccation and minimal broad-based disc bulge resulting in trace indentation on the anterior thecal sac, but no significant spinal canal narrowing.  Bilateral facet arthropathy noted.  Mild left foraminal narrowing.  Minimal right foraminal narrowing.     Previously described complex collection within the left gluteus musculature is again noted.  This finding extends to the subcutaneous tissues of the left buttocks measuring up to 8.1 x 4.9 cm in axial dimensions.  There is mixed internal signal on T1 and T2 sequences with no change in signal characteristics before and after intravenous administration of contrast.  This indicates no significant enhancement and suggests the presence of a hematoma.    - MRI Cervical spine 9/9/22:  The craniocervical junction is intact.  There is no evidence for a Chiari malformation.  The spinal is normal in signal cord edema or myelomalacia.  The incidentally visualized soft tissue structures of the neck have a normal appearance.  There is an exaggerated cervical lordosis.  Vertebral body heights are maintained.  There is disc desiccation with disc space narrowing throughout the cervical spine.  Marrow signal is normal without evidence for a marrow replacement process, infection or tumor.  At C2-3, no disc herniation, central canal stenosis or neural foraminal narrowing.  At C3-4, posterior disc osteophyte complex with uncovertebral spurring and facet arthropathy contributing to mild central canal stenosis with moderate left and moderate severe right neural foraminal narrowing.  At C4-5, posterior disc  osteophyte complex with uncovertebral spurring and facet arthropathy contributing to mild moderate central canal stenosis with mild moderate bilateral neural foraminal narrowing.  C5-6, posterior disc osteophyte complex with uncovertebral spurring and facet arthropathy contributing to mild central canal stenosis with moderate severe left and severe right neural foraminal narrowing.  At C6-7, posterior disc osteophyte complex with uncovertebral spurring and facet arthropathy contributing to mild left and mild moderate right neural foraminal narrowing.  At C7-T1, no disc herniation, central canal stenosis or neural foraminal narrowing.    - MRI Lumbar spine 6/20/22:      Labs:  BMP  Lab Results   Component Value Date     09/16/2022    K 3.9 09/16/2022     09/16/2022    CO2 27 09/16/2022    BUN 22 09/16/2022    CREATININE 1.5 (H) 09/16/2022    CALCIUM 9.0 09/16/2022    ANIONGAP 10 09/16/2022     Lab Results   Component Value Date    ALT 42 09/16/2022    AST 31 09/16/2022    ALKPHOS 69 09/16/2022    BILITOT 0.6 09/16/2022     No results found for: PLT    Assessment:  Adan Bermudez is a 77 y.o. male with the following diagnoses based on history, exam, and imaging:    Problem List Items Addressed This Visit          Cardiac/Vascular    Atrial fibrillation    Relevant Orders    Ambulatory referral/consult to Cardiology       Orthopedic    Chronic low back pain     Other Visit Diagnoses       Type 2 diabetes mellitus with diabetic polyneuropathy, without long-term current use of insulin    -  Primary    Relevant Orders    Ambulatory referral/consult to Endocrinology    Chronic neck pain        Impaired functional mobility, balance, gait, and endurance                  This is a pleasant 77 y.o. gentleman presenting with:     - Chronic neck: Appears to be facetogenic in the upper cervical spine  - Chronic bilateral low back pain: Pain appears primarily facetogenic. Moderate foraminal narrowing at L5-S1 on  prior MRI, but no significant canal narrowing to clearly explain his leg symptoms though does seem like LSS.   - Soft tissue mass in left paraspinal region above iliac crest  - Comorbidities: Paroxysmal A-fib on eliquis. HTN. DM2. CKD.     Treatment Plan:   - PT/OT/HEP: Cont PT. Discussed benefits of exercise for pain.   - Procedures: None at this time   - Recommended he focus on his primary issues and when those are better controlled, we will address his painful issues.   - Medications: No changes recommended at this time.  - Imaging: Reviewed.  - Labs: Reviewed.    - Consult cardiology with his h/o a-fib, SOB, light-headedness  - Consult endocrinology regarding his DM2.     Follow Up: RTC PRN     Amber Doyle M.D.  Interventional Pain Medicine / Physical Medicine & Rehabilitation    Disclaimer: This note was partly generated using dictation software which may occasionally result in transcription errors.

## 2022-10-14 ENCOUNTER — OFFICE VISIT (OUTPATIENT)
Dept: CARDIOLOGY | Facility: CLINIC | Age: 77
End: 2022-10-14
Payer: MEDICARE

## 2022-10-14 VITALS
HEART RATE: 53 BPM | BODY MASS INDEX: 35.36 KG/M2 | DIASTOLIC BLOOD PRESSURE: 62 MMHG | HEIGHT: 71 IN | SYSTOLIC BLOOD PRESSURE: 92 MMHG | RESPIRATION RATE: 14 BRPM

## 2022-10-14 DIAGNOSIS — I10 ESSENTIAL HYPERTENSION: ICD-10-CM

## 2022-10-14 DIAGNOSIS — I48.91 ATRIAL FIBRILLATION, UNSPECIFIED TYPE: ICD-10-CM

## 2022-10-14 DIAGNOSIS — E78.5 HYPERLIPIDEMIA, UNSPECIFIED HYPERLIPIDEMIA TYPE: ICD-10-CM

## 2022-10-14 DIAGNOSIS — I48.0 PAROXYSMAL ATRIAL FIBRILLATION: ICD-10-CM

## 2022-10-14 DIAGNOSIS — N18.31 STAGE 3A CHRONIC KIDNEY DISEASE: ICD-10-CM

## 2022-10-14 DIAGNOSIS — E11.9 DIABETES MELLITUS WITHOUT COMPLICATION: ICD-10-CM

## 2022-10-14 DIAGNOSIS — E66.01 CLASS 2 SEVERE OBESITY DUE TO EXCESS CALORIES WITH SERIOUS COMORBIDITY AND BODY MASS INDEX (BMI) OF 35.0 TO 35.9 IN ADULT: ICD-10-CM

## 2022-10-14 PROCEDURE — 1126F PR PAIN SEVERITY QUANTIFIED, NO PAIN PRESENT: ICD-10-PCS | Mod: CPTII,S$GLB,, | Performed by: INTERNAL MEDICINE

## 2022-10-14 PROCEDURE — 1159F MED LIST DOCD IN RCRD: CPT | Mod: CPTII,S$GLB,, | Performed by: INTERNAL MEDICINE

## 2022-10-14 PROCEDURE — 1160F RVW MEDS BY RX/DR IN RCRD: CPT | Mod: CPTII,S$GLB,, | Performed by: INTERNAL MEDICINE

## 2022-10-14 PROCEDURE — 3078F PR MOST RECENT DIASTOLIC BLOOD PRESSURE < 80 MM HG: ICD-10-PCS | Mod: CPTII,S$GLB,, | Performed by: INTERNAL MEDICINE

## 2022-10-14 PROCEDURE — 1126F AMNT PAIN NOTED NONE PRSNT: CPT | Mod: CPTII,S$GLB,, | Performed by: INTERNAL MEDICINE

## 2022-10-14 PROCEDURE — 99999 PR PBB SHADOW E&M-EST. PATIENT-LVL IV: ICD-10-PCS | Mod: PBBFAC,,, | Performed by: INTERNAL MEDICINE

## 2022-10-14 PROCEDURE — 1160F PR REVIEW ALL MEDS BY PRESCRIBER/CLIN PHARMACIST DOCUMENTED: ICD-10-PCS | Mod: CPTII,S$GLB,, | Performed by: INTERNAL MEDICINE

## 2022-10-14 PROCEDURE — 3078F DIAST BP <80 MM HG: CPT | Mod: CPTII,S$GLB,, | Performed by: INTERNAL MEDICINE

## 2022-10-14 PROCEDURE — 99999 PR PBB SHADOW E&M-EST. PATIENT-LVL IV: CPT | Mod: PBBFAC,,, | Performed by: INTERNAL MEDICINE

## 2022-10-14 PROCEDURE — 99204 OFFICE O/P NEW MOD 45 MIN: CPT | Mod: S$GLB,,, | Performed by: INTERNAL MEDICINE

## 2022-10-14 PROCEDURE — 99204 PR OFFICE/OUTPT VISIT, NEW, LEVL IV, 45-59 MIN: ICD-10-PCS | Mod: S$GLB,,, | Performed by: INTERNAL MEDICINE

## 2022-10-14 PROCEDURE — 3074F SYST BP LT 130 MM HG: CPT | Mod: CPTII,S$GLB,, | Performed by: INTERNAL MEDICINE

## 2022-10-14 PROCEDURE — 3074F PR MOST RECENT SYSTOLIC BLOOD PRESSURE < 130 MM HG: ICD-10-PCS | Mod: CPTII,S$GLB,, | Performed by: INTERNAL MEDICINE

## 2022-10-14 PROCEDURE — 1159F PR MEDICATION LIST DOCUMENTED IN MEDICAL RECORD: ICD-10-PCS | Mod: CPTII,S$GLB,, | Performed by: INTERNAL MEDICINE

## 2022-10-14 RX ORDER — MECLIZINE HYDROCHLORIDE 25 MG/1
25 TABLET ORAL 3 TIMES DAILY PRN
COMMUNITY
Start: 2022-10-13

## 2022-10-14 NOTE — PROGRESS NOTES
[facility]     Subjective:    Patient ID:  Adan Bermudez is a 77 y.o. male who presents for evaluation of Atrial Fibrillation (Dizziness, nausea, weakness in both legs since starting Eliquis), Hypertension, Hyperlipidemia, and Diabetes Mellitus    Review of patient's allergies indicates:   Allergen Reactions    Bee sting [allergen ext-venom-honey bee] Shortness Of Breath      He is here for continuing cardiac care.  He states 2 years ago he had a bee sting.  He developed AFib.  He was admitted to the hospital.  He remains on amiodarone 200 mg 2 times daily.  He has a history of diabetes hypertension and hyperlipidemia.  He has bad knees.  He is seen in a wheelchair.  He was diagnosed with severe sleep apnea.  He uses a CPAP machine.  He is on diuretics.  His GFR is 48, serum creatinine 1.5.  He is reporting shortness of breath he has no history of coronary artery disease.  His rhythm is regular today.      Review of Systems   Constitutional: Negative for chills, decreased appetite, diaphoresis, fever, malaise/fatigue, night sweats, weight gain and weight loss.   HENT:  Negative for congestion, ear discharge, ear pain, hearing loss, hoarse voice, nosebleeds, odynophagia, sore throat, stridor and tinnitus.    Eyes:  Negative for blurred vision, discharge, double vision, pain, photophobia, redness, vision loss in left eye, vision loss in right eye, visual disturbance and visual halos.   Cardiovascular:  Positive for dyspnea on exertion and leg swelling. Negative for chest pain, claudication, cyanosis, irregular heartbeat, near-syncope, orthopnea, palpitations, paroxysmal nocturnal dyspnea and syncope.   Respiratory:  Negative for cough, hemoptysis, shortness of breath, sleep disturbances due to breathing, snoring, sputum production and wheezing.    Endocrine: Negative for cold intolerance, heat intolerance, polydipsia, polyphagia and  "polyuria.   Hematologic/Lymphatic: Negative for adenopathy and bleeding problem. Does not bruise/bleed easily.   Skin:  Negative for color change, dry skin, flushing, itching, nail changes, poor wound healing, rash, skin cancer, suspicious lesions and unusual hair distribution.   Musculoskeletal:  Positive for back pain, falls and joint pain. Negative for arthritis, gout, joint swelling, muscle cramps, muscle weakness, myalgias, neck pain and stiffness.   Gastrointestinal:  Negative for bloating, abdominal pain, anorexia, change in bowel habit, bowel incontinence, constipation, diarrhea, dysphagia, excessive appetite, flatus, heartburn, hematemesis, hematochezia, hemorrhoids, jaundice, melena, nausea and vomiting.   Genitourinary:  Negative for bladder incontinence, decreased libido, dysuria, flank pain, frequency, genital sores, hematuria, hesitancy, incomplete emptying, nocturia and urgency.   Neurological:  Positive for dizziness and loss of balance. Negative for aphonia, brief paralysis, difficulty with concentration, disturbances in coordination, excessive daytime sleepiness, focal weakness, headaches, light-headedness, numbness, paresthesias, seizures, sensory change, tremors, vertigo and weakness.   Psychiatric/Behavioral:  Negative for altered mental status, depression, hallucinations, memory loss, substance abuse, suicidal ideas and thoughts of violence. The patient does not have insomnia and is not nervous/anxious.    Allergic/Immunologic: Negative for hives and persistent infections.      Objective:       Vitals:    10/14/22 1524   BP: 92/62   BP Location: Right arm   Patient Position: Sitting   BP Method: Medium (Manual)   Pulse: (!) 53   Resp: 14   Height: 5' 11" (1.803 m)    Physical Exam  Constitutional:       General: He is not in acute distress.     Appearance: He is well-developed. He is not diaphoretic.   HENT:      Head: Normocephalic and atraumatic.      Nose: Nose normal.   Eyes:      General: " No scleral icterus.        Right eye: No discharge.      Conjunctiva/sclera: Conjunctivae normal.      Pupils: Pupils are equal, round, and reactive to light.   Neck:      Thyroid: No thyromegaly.      Vascular: No JVD.      Trachea: No tracheal deviation.   Cardiovascular:      Rate and Rhythm: Regular rhythm. Bradycardia present.      Pulses:           Carotid pulses are 2+ on the right side and 2+ on the left side.       Radial pulses are 2+ on the right side and 2+ on the left side.        Dorsalis pedis pulses are 1+ on the right side and 1+ on the left side.        Posterior tibial pulses are 1+ on the right side and 1+ on the left side.      Heart sounds: Normal heart sounds. No murmur heard.    No friction rub. No gallop.   Pulmonary:      Effort: Pulmonary effort is normal. No respiratory distress.      Breath sounds: Normal breath sounds. No stridor. No wheezing or rales.   Chest:      Chest wall: No tenderness.   Abdominal:      General: Bowel sounds are normal. There is no distension.      Palpations: Abdomen is soft. There is no mass.      Tenderness: There is no abdominal tenderness. There is no guarding or rebound.   Musculoskeletal:         General: No tenderness. Normal range of motion.      Cervical back: Normal range of motion and neck supple.   Lymphadenopathy:      Cervical: No cervical adenopathy.   Skin:     General: Skin is warm and dry.      Coloration: Skin is not pale.      Findings: No erythema or rash.   Neurological:      Mental Status: He is alert and oriented to person, place, and time.      Cranial Nerves: No cranial nerve deficit.      Coordination: Coordination normal.   Psychiatric:         Behavior: Behavior normal.         Thought Content: Thought content normal.         Judgment: Judgment normal.         Assessment:       1. Atrial fibrillation, unspecified type    2. Paroxysmal atrial fibrillation    3. Hyperlipidemia, unspecified hyperlipidemia type    4. Essential  hypertension    5. Diabetes mellitus without complication    6. Stage 3a chronic kidney disease    7. Class 2 severe obesity due to excess calories with serious comorbidity and body mass index (BMI) of 35.0 to 35.9 in adult      Results for orders placed or performed during the hospital encounter of 09/16/22   POCT glucose   Result Value Ref Range    POCT Glucose 109 70 - 110 mg/dL   POCT glucose   Result Value Ref Range    POCT Glucose 118 (H) 70 - 110 mg/dL         Current Outpatient Medications:     amiodarone (PACERONE) 200 MG Tab, Take 200 mg by mouth once daily., Disp: , Rfl:     doxazosin (CARDURA) 2 MG tablet, Take 2 mg by mouth once daily., Disp: , Rfl:     ELIQUIS 5 mg Tab, Take 5 mg by mouth 2 (two) times daily., Disp: , Rfl:     EPINEPHrine (EPIPEN) 0.3 mg/0.3 mL AtIn, INJECT AS DIRECTED, Disp: , Rfl:     meclizine (ANTIVERT) 25 mg tablet, Take 25 mg by mouth 3 (three) times daily as needed., Disp: , Rfl:     metFORMIN (GLUCOPHAGE) 500 MG tablet, Take 500 mg by mouth once daily., Disp: , Rfl:     metoprolol succinate (TOPROL-XL) 25 MG 24 hr tablet, Take 25 mg by mouth once daily., Disp: , Rfl:     nebivoloL (BYSTOLIC) 5 MG Tab, Take 5 mg by mouth once daily., Disp: , Rfl:     ramipriL (ALTACE) 10 MG capsule, Take 10 mg by mouth once daily., Disp: , Rfl:     rosuvastatin (CRESTOR) 20 MG tablet, Take 20 mg by mouth nightly., Disp: , Rfl:     torsemide (DEMADEX) 20 MG Tab, Take 20 mg by mouth once daily., Disp: , Rfl:      No results found for: WBC, RBC, HGB, HCT, MCV, MCH, MCHC, RDW, PLT, MPV, GRAN, LYMPH, MONO, EOS, BASO, EOSINOPHIL, BASOPHIL, MG     CMP  Lab Results   Component Value Date     09/16/2022    K 3.9 09/16/2022     09/16/2022    CO2 27 09/16/2022     09/16/2022    BUN 22 09/16/2022    CREATININE 1.5 (H) 09/16/2022    CALCIUM 9.0 09/16/2022    PROT 6.6 09/16/2022    ALBUMIN 3.3 (L) 09/16/2022    BILITOT 0.6 09/16/2022    ALKPHOS 69 09/16/2022    AST 31 09/16/2022    ALT 42  09/16/2022    ANIONGAP 10 09/16/2022        No results found for: LABBLOO, LABURIN, RESPIRATORYC, GSRESP         No results found for this or any previous visit.     MRI Brain Without Contrast 09/30/2022 [id number] Final   MRI Lumbar Spine W WO Contrast 09/27/2022 [id number] Final   [facility] 09/16/2022 [id number] Final            Plan:       Problem List Items Addressed This Visit          Cardiac/Vascular    Paroxysmal atrial fibrillation     Amiodarone usage noted for approximately 2 years.  I decreased his dose to 200 mg daily.  His exam confirms regular rhythm.  Electrocardiogram to be done on next visit.  He is bradycardic and given his falls I am trying to improve his resting heart rates.  He does take beta-blockers, amiodarone can cause bradycardia.    Echocardiogram ordered, I am thinking about withdrawing amiodarone completely in the near future.  Eliquis tolerated adequately.         HLD (hyperlipidemia)     He is on statin therapy.  It will be continued.  His doses Crestor 20 mg.  No labs for review at this time.         Essential hypertension     His current therapy will be continued.  He is on 3 agents for hypertension.    Two beta-blockers are listed on his home meds, I need to clarify this on next visit.            Renal/    Stage 3a chronic kidney disease     GFR 48 range, serum creatinine 1.5 range.  He does take Demadex 20 mg at home.            Endocrine    Diabetes mellitus without complication     He takes Glucophage.         Class 2 obesity in adult     Weight loss encouraged.               An echocardiogram is ordered.  I would like to see him back after the echo in 3 weeks.  I have advise reducing amiodarone to 200 mg per day.  I cleared him to decrease his diuretic dosing to 20 mg Demadex daily at his request.  His blood pressures are on the low side today.    Thank you for allowing me to participate in your patient's care.               John Aguilar MD  10/17/2022   3:37  PM

## 2022-10-17 PROBLEM — E66.812 CLASS 2 OBESITY IN ADULT: Status: ACTIVE | Noted: 2022-10-17

## 2022-10-17 PROBLEM — E11.9 DIABETES MELLITUS WITHOUT COMPLICATION: Status: ACTIVE | Noted: 2022-10-17

## 2022-10-17 PROBLEM — E66.9 CLASS 2 OBESITY IN ADULT: Status: ACTIVE | Noted: 2022-10-17

## 2022-10-17 PROBLEM — N18.31 STAGE 3A CHRONIC KIDNEY DISEASE: Status: ACTIVE | Noted: 2022-10-17

## 2022-10-17 PROBLEM — I10 ESSENTIAL HYPERTENSION: Status: ACTIVE | Noted: 2022-10-17

## 2022-10-17 PROBLEM — I48.0 PAROXYSMAL ATRIAL FIBRILLATION: Status: ACTIVE | Noted: 2022-06-07

## 2022-10-17 NOTE — ASSESSMENT & PLAN NOTE
He is on statin therapy.  It will be continued.  His doses Crestor 20 mg.  No labs for review at this time.

## 2022-10-17 NOTE — ASSESSMENT & PLAN NOTE
Amiodarone usage noted for approximately 2 years.  I decreased his dose to 200 mg daily.  His exam confirms regular rhythm.  Electrocardiogram to be done on next visit.  He is bradycardic and given his falls I am trying to improve his resting heart rates.  He does take beta-blockers, amiodarone can cause bradycardia.    Echocardiogram ordered, I am thinking about withdrawing amiodarone completely in the near future.  Eliquis tolerated adequately.

## 2022-10-17 NOTE — ASSESSMENT & PLAN NOTE
His current therapy will be continued.  He is on 3 agents for hypertension.    Two beta-blockers are listed on his home meds, I need to clarify this on next visit.

## 2022-10-19 ENCOUNTER — HOSPITAL ENCOUNTER (OUTPATIENT)
Dept: PULMONOLOGY | Facility: HOSPITAL | Age: 77
Discharge: HOME | End: 2022-10-19
Attending: INTERNAL MEDICINE
Payer: MEDICARE

## 2022-10-19 DIAGNOSIS — I48.91 ATRIAL FIBRILLATION, UNSPECIFIED TYPE: ICD-10-CM

## 2022-10-19 PROCEDURE — 93306 TTE W/DOPPLER COMPLETE: CPT

## 2022-10-19 PROCEDURE — 93306 TTE W/DOPPLER COMPLETE: CPT | Mod: 26,,, | Performed by: INTERNAL MEDICINE

## 2022-10-19 PROCEDURE — 93306 ECHO (CUPID ONLY): ICD-10-PCS | Mod: 26,,, | Performed by: INTERNAL MEDICINE

## 2022-10-20 LAB
AORTIC ROOT ANNULUS: 2.9 CM
ASCENDING AORTA: 3.91 CM
AV INDEX (PROSTH): 0.89
AV MEAN GRADIENT: 2 MMHG
AV PEAK GRADIENT: 4 MMHG
AV VALVE AREA: 3.38 CM2
AV VELOCITY RATIO: 0.92
CV ECHO LV RWT: 0.38 CM
DOP CALC AO PEAK VEL: 0.98 M/S
DOP CALC AO VTI: 23.6 CM
DOP CALC LVOT AREA: 3.8 CM2
DOP CALC LVOT DIAMETER: 2.2 CM
DOP CALC LVOT PEAK VEL: 0.9 M/S
DOP CALC LVOT STROKE VOLUME: 79.79 CM3
DOP CALC RVOT PEAK VEL: 1 M/S
DOP CALC RVOT VTI: 25.4 CM
DOP CALCLVOT PEAK VEL VTI: 21 CM
E WAVE DECELERATION TIME: 305.92 MSEC
E/A RATIO: 0.85
E/E' RATIO: 8.75 M/S
ECHO LV POSTERIOR WALL: 1 CM (ref 0.6–1.1)
EJECTION FRACTION: 55 %
FRACTIONAL SHORTENING: 54 % (ref 28–44)
INTERVENTRICULAR SEPTUM: 1 CM (ref 0.6–1.1)
IVRT: 148.43 MSEC
LA MAJOR: 5.81 CM
LA MINOR: 5.59 CM
LA WIDTH: 4.3 CM
LEFT ATRIUM SIZE: 5.46 CM
LEFT ATRIUM VOLUME: 113.71 CM3
LEFT INTERNAL DIMENSION IN SYSTOLE: 2.4 CM (ref 2.1–4)
LEFT VENTRICLE DIASTOLIC VOLUME: 210.33 ML
LEFT VENTRICLE SYSTOLIC VOLUME: 119.44 ML
LEFT VENTRICULAR INTERNAL DIMENSION IN DIASTOLE: 5.2 CM (ref 3.5–6)
LEFT VENTRICULAR MASS: 194.16 G
LV LATERAL E/E' RATIO: 10 M/S
LV SEPTAL E/E' RATIO: 7.78 M/S
LVOT MG: 2.3 MMHG
LVOT MV: 0.74 CM/S
MV PEAK A VEL: 0.82 M/S
MV PEAK E VEL: 0.7 M/S
MV STENOSIS PRESSURE HALF TIME: 88.72 MS
MV VALVE AREA P 1/2 METHOD: 2.48 CM2
PISA TR MAX VEL: 2.4 M/S
PV MEAN GRADIENT: 2.59 MMHG
PV MV: 0.79 M/S
PV PEAK VELOCITY: 1 CM/S
RA MAJOR: 4.21 CM
RA PRESSURE: 3 MMHG
RA WIDTH: 3.55 CM
RIGHT VENTRICULAR END-DIASTOLIC DIMENSION: 3.2 CM
SINUS: 4.01 CM
STJ: 3.92 CM
TDI LATERAL: 0.07 M/S
TDI SEPTAL: 0.09 M/S
TDI: 0.08 M/S
TR MAX PG: 23 MMHG
TV REST PULMONARY ARTERY PRESSURE: 26 MMHG

## 2022-10-24 ENCOUNTER — PATIENT MESSAGE (OUTPATIENT)
Dept: CARDIOLOGY | Facility: CLINIC | Age: 77
End: 2022-10-24
Payer: MEDICARE

## 2022-11-16 ENCOUNTER — TELEPHONE (OUTPATIENT)
Dept: ENDOCRINOLOGY | Facility: CLINIC | Age: 77
End: 2022-11-16

## 2022-11-16 ENCOUNTER — OFFICE VISIT (OUTPATIENT)
Dept: ENDOCRINOLOGY | Facility: CLINIC | Age: 77
End: 2022-11-16
Payer: MEDICARE

## 2022-11-16 VITALS
RESPIRATION RATE: 14 BRPM | SYSTOLIC BLOOD PRESSURE: 118 MMHG | DIASTOLIC BLOOD PRESSURE: 60 MMHG | HEIGHT: 71 IN | WEIGHT: 249.13 LBS | BODY MASS INDEX: 34.88 KG/M2 | HEART RATE: 49 BPM

## 2022-11-16 DIAGNOSIS — E11.22 TYPE 2 DIABETES MELLITUS WITH STAGE 3A CHRONIC KIDNEY DISEASE, WITH LONG-TERM CURRENT USE OF INSULIN: ICD-10-CM

## 2022-11-16 DIAGNOSIS — I10 ESSENTIAL HYPERTENSION: ICD-10-CM

## 2022-11-16 DIAGNOSIS — E66.01 CLASS 2 SEVERE OBESITY DUE TO EXCESS CALORIES WITH SERIOUS COMORBIDITY AND BODY MASS INDEX (BMI) OF 35.0 TO 35.9 IN ADULT: ICD-10-CM

## 2022-11-16 DIAGNOSIS — E78.2 MIXED HYPERLIPIDEMIA: ICD-10-CM

## 2022-11-16 DIAGNOSIS — N18.31 TYPE 2 DIABETES MELLITUS WITH STAGE 3A CHRONIC KIDNEY DISEASE, WITH LONG-TERM CURRENT USE OF INSULIN: ICD-10-CM

## 2022-11-16 DIAGNOSIS — Z79.4 TYPE 2 DIABETES MELLITUS WITH STAGE 3A CHRONIC KIDNEY DISEASE, WITH LONG-TERM CURRENT USE OF INSULIN: ICD-10-CM

## 2022-11-16 DIAGNOSIS — E11.42 TYPE 2 DIABETES MELLITUS WITH DIABETIC POLYNEUROPATHY, WITHOUT LONG-TERM CURRENT USE OF INSULIN: ICD-10-CM

## 2022-11-16 DIAGNOSIS — I48.0 PAROXYSMAL ATRIAL FIBRILLATION: ICD-10-CM

## 2022-11-16 PROCEDURE — 1160F RVW MEDS BY RX/DR IN RCRD: CPT | Mod: CPTII,S$GLB,, | Performed by: STUDENT IN AN ORGANIZED HEALTH CARE EDUCATION/TRAINING PROGRAM

## 2022-11-16 PROCEDURE — 1159F MED LIST DOCD IN RCRD: CPT | Mod: CPTII,S$GLB,, | Performed by: STUDENT IN AN ORGANIZED HEALTH CARE EDUCATION/TRAINING PROGRAM

## 2022-11-16 PROCEDURE — 99204 PR OFFICE/OUTPT VISIT, NEW, LEVL IV, 45-59 MIN: ICD-10-PCS | Mod: S$GLB,,, | Performed by: STUDENT IN AN ORGANIZED HEALTH CARE EDUCATION/TRAINING PROGRAM

## 2022-11-16 PROCEDURE — 99999 PR PBB SHADOW E&M-EST. PATIENT-LVL IV: ICD-10-PCS | Mod: PBBFAC,,, | Performed by: STUDENT IN AN ORGANIZED HEALTH CARE EDUCATION/TRAINING PROGRAM

## 2022-11-16 PROCEDURE — 3074F PR MOST RECENT SYSTOLIC BLOOD PRESSURE < 130 MM HG: ICD-10-PCS | Mod: CPTII,S$GLB,, | Performed by: STUDENT IN AN ORGANIZED HEALTH CARE EDUCATION/TRAINING PROGRAM

## 2022-11-16 PROCEDURE — 3074F SYST BP LT 130 MM HG: CPT | Mod: CPTII,S$GLB,, | Performed by: STUDENT IN AN ORGANIZED HEALTH CARE EDUCATION/TRAINING PROGRAM

## 2022-11-16 PROCEDURE — 1126F AMNT PAIN NOTED NONE PRSNT: CPT | Mod: CPTII,S$GLB,, | Performed by: STUDENT IN AN ORGANIZED HEALTH CARE EDUCATION/TRAINING PROGRAM

## 2022-11-16 PROCEDURE — 1100F PR PT FALLS ASSESS DOC 2+ FALLS/FALL W/INJURY/YR: ICD-10-PCS | Mod: CPTII,S$GLB,, | Performed by: STUDENT IN AN ORGANIZED HEALTH CARE EDUCATION/TRAINING PROGRAM

## 2022-11-16 PROCEDURE — 3288F FALL RISK ASSESSMENT DOCD: CPT | Mod: CPTII,S$GLB,, | Performed by: STUDENT IN AN ORGANIZED HEALTH CARE EDUCATION/TRAINING PROGRAM

## 2022-11-16 PROCEDURE — 1159F PR MEDICATION LIST DOCUMENTED IN MEDICAL RECORD: ICD-10-PCS | Mod: CPTII,S$GLB,, | Performed by: STUDENT IN AN ORGANIZED HEALTH CARE EDUCATION/TRAINING PROGRAM

## 2022-11-16 PROCEDURE — 1160F PR REVIEW ALL MEDS BY PRESCRIBER/CLIN PHARMACIST DOCUMENTED: ICD-10-PCS | Mod: CPTII,S$GLB,, | Performed by: STUDENT IN AN ORGANIZED HEALTH CARE EDUCATION/TRAINING PROGRAM

## 2022-11-16 PROCEDURE — 1100F PTFALLS ASSESS-DOCD GE2>/YR: CPT | Mod: CPTII,S$GLB,, | Performed by: STUDENT IN AN ORGANIZED HEALTH CARE EDUCATION/TRAINING PROGRAM

## 2022-11-16 PROCEDURE — 99204 OFFICE O/P NEW MOD 45 MIN: CPT | Mod: S$GLB,,, | Performed by: STUDENT IN AN ORGANIZED HEALTH CARE EDUCATION/TRAINING PROGRAM

## 2022-11-16 PROCEDURE — 3078F PR MOST RECENT DIASTOLIC BLOOD PRESSURE < 80 MM HG: ICD-10-PCS | Mod: CPTII,S$GLB,, | Performed by: STUDENT IN AN ORGANIZED HEALTH CARE EDUCATION/TRAINING PROGRAM

## 2022-11-16 PROCEDURE — 3288F PR FALLS RISK ASSESSMENT DOCUMENTED: ICD-10-PCS | Mod: CPTII,S$GLB,, | Performed by: STUDENT IN AN ORGANIZED HEALTH CARE EDUCATION/TRAINING PROGRAM

## 2022-11-16 PROCEDURE — 3078F DIAST BP <80 MM HG: CPT | Mod: CPTII,S$GLB,, | Performed by: STUDENT IN AN ORGANIZED HEALTH CARE EDUCATION/TRAINING PROGRAM

## 2022-11-16 PROCEDURE — 1126F PR PAIN SEVERITY QUANTIFIED, NO PAIN PRESENT: ICD-10-PCS | Mod: CPTII,S$GLB,, | Performed by: STUDENT IN AN ORGANIZED HEALTH CARE EDUCATION/TRAINING PROGRAM

## 2022-11-16 PROCEDURE — 99999 PR PBB SHADOW E&M-EST. PATIENT-LVL IV: CPT | Mod: PBBFAC,,, | Performed by: STUDENT IN AN ORGANIZED HEALTH CARE EDUCATION/TRAINING PROGRAM

## 2022-11-16 NOTE — ASSESSMENT & PLAN NOTE
Uncontrolled based on A1c and reported POCT glucose with significant glycemic variability.    Patient is on an intensive insulin regimen with 4 daily injections and is testing glucose 4+ times daily. Patient has demonstrated an understanding of technology and is motivated to use the device correctly. Patient is expected to adhere to a diabetes treatment plan and is capable of recognizing alerts and alarms. Patient has recurrent, severe (BG <54) hypoglycemia and impaired awareness of hypoglycemia.    Patient's insulin regimen requires frequent adjustments based on BG results. Patient will receive an in-person visit every 6 months to assess adherence to CGM regimen and diabetes treatment.    Plan  - Continue Metformin 500 mg daily  - Continue Novolog SSI 4 times daily  - Start Dexcom G6 CGM  - Recheck labs    F/u 4 months

## 2022-11-16 NOTE — PROGRESS NOTES
"Subjective:      Patient ID: Adan Bermudez is a 77 y.o. male.    Chief Complaint:  Type 2 diabetes mellitus    History of Present Illness  This is a 77 y.o. male. with a past medical history of Type 2 diabetes mellitus, HTN, Afib here for evaluation.    Type 2 diabetes mellitus    Current diabetes medications:  - Metformin 500 mg daily  - Novolog SSI 4 times daily    INSULIN CORRECTION SCALE    Glucose                 Insulin           181-200               +2 units                     201-250               +4 units                      251-300               +6 units                     301-350               +8 units                      >350                    +10 units                       Past diabetes medications:  - None    Lab Results   Component Value Date    CREATININE 1.5 (H) 09/16/2022    EGFRNORACEVR 48 (A) 09/16/2022       Known diabetic complications: nephropathy    Weight trend:  Wt Readings from Last 5 Encounters:   11/16/22 113 kg (249 lb 1.9 oz)   09/27/22 115 kg (253 lb 8.5 oz)   09/02/22 116.1 kg (255 lb 15.3 oz)   03/24/22 123.5 kg (272 lb 4.3 oz)     Family history: Mother side    Blood glucose monitoring at home:   Home blood sugar records:   Any episodes of hypoglycemia? Yes    Diabetes Management Status  Statin: Taking  ACE/ARB: Taking    Outside labs  6/1/22  A1c 7.2%    Lab Results   Component Value Date    TSH 3.546 03/24/2022         Review of Systems  As above    Social and family history reviewed  Current medications and allergies reviewed    Objective:   /60 (BP Location: Right arm, Patient Position: Sitting, BP Method: Medium (Manual))   Pulse (!) 49   Resp 14   Ht 5' 11" (1.803 m)   Wt 113 kg (249 lb 1.9 oz)   BMI 34.75 kg/m²   Physical Exam  Alert, oriented    BP Readings from Last 1 Encounters:   11/16/22 118/60      Wt Readings from Last 1 Encounters:   11/16/22 1516 113 kg (249 lb 1.9 oz)     Body mass index is 34.75 kg/m².    Lab Review:   No results found " for: HGBA1C  No results found for: CHOL, HDL, LDLCALC, TRIG, CHOLHDL  Lab Results   Component Value Date     09/16/2022    K 3.9 09/16/2022     09/16/2022    CO2 27 09/16/2022     09/16/2022    BUN 22 09/16/2022    CREATININE 1.5 (H) 09/16/2022    CALCIUM 9.0 09/16/2022    PROT 6.6 09/16/2022    ALBUMIN 3.3 (L) 09/16/2022    BILITOT 0.6 09/16/2022    ALKPHOS 69 09/16/2022    AST 31 09/16/2022    ALT 42 09/16/2022    ANIONGAP 10 09/16/2022    TSH 3.546 03/24/2022       All pertinent labs reviewed    Assessment and Plan     Type 2 diabetes mellitus with stage 3a chronic kidney disease, with long-term current use of insulin  Uncontrolled based on A1c and reported POCT glucose with significant glycemic variability.    Patient is on an intensive insulin regimen with 4 daily injections and is testing glucose 4+ times daily. Patient has demonstrated an understanding of technology and is motivated to use the device correctly. Patient is expected to adhere to a diabetes treatment plan and is capable of recognizing alerts and alarms. Patient has recurrent, severe (BG <54) hypoglycemia and impaired awareness of hypoglycemia.    Patient's insulin regimen requires frequent adjustments based on BG results. Patient will receive an in-person visit every 6 months to assess adherence to CGM regimen and diabetes treatment.    Plan  - Continue Metformin 500 mg daily  - Continue Novolog SSI 4 times daily  - Start Dexcom G6 CGM  - Recheck labs    F/u 4 months    Paroxysmal atrial fibrillation  On chronic amiodarone  Check TSH    HLD (hyperlipidemia)  Continue statin    Essential hypertension  Continue antihypertensive regimen including ARB/ACEi    Class 2 obesity in adult  He has been losing weight, avoid GLP-1 RA at this time      Follow-up in 4 months    Jose Wilcox MD  Endocrinology

## 2022-11-18 ENCOUNTER — LAB VISIT (OUTPATIENT)
Dept: LAB | Facility: HOSPITAL | Age: 77
End: 2022-11-18
Attending: STUDENT IN AN ORGANIZED HEALTH CARE EDUCATION/TRAINING PROGRAM
Payer: MEDICARE

## 2022-11-18 ENCOUNTER — OFFICE VISIT (OUTPATIENT)
Dept: CARDIOLOGY | Facility: CLINIC | Age: 77
End: 2022-11-18
Payer: MEDICARE

## 2022-11-18 VITALS
HEIGHT: 71 IN | DIASTOLIC BLOOD PRESSURE: 62 MMHG | HEART RATE: 50 BPM | OXYGEN SATURATION: 97 % | SYSTOLIC BLOOD PRESSURE: 132 MMHG | WEIGHT: 249 LBS | BODY MASS INDEX: 34.86 KG/M2

## 2022-11-18 DIAGNOSIS — E11.22 TYPE 2 DIABETES MELLITUS WITH STAGE 3A CHRONIC KIDNEY DISEASE, WITH LONG-TERM CURRENT USE OF INSULIN: ICD-10-CM

## 2022-11-18 DIAGNOSIS — N18.31 TYPE 2 DIABETES MELLITUS WITH STAGE 3A CHRONIC KIDNEY DISEASE, WITH LONG-TERM CURRENT USE OF INSULIN: ICD-10-CM

## 2022-11-18 DIAGNOSIS — I25.10 CORONARY ARTERY DISEASE INVOLVING NATIVE CORONARY ARTERY OF NATIVE HEART WITHOUT ANGINA PECTORIS: ICD-10-CM

## 2022-11-18 DIAGNOSIS — Z79.4 TYPE 2 DIABETES MELLITUS WITH STAGE 3A CHRONIC KIDNEY DISEASE, WITH LONG-TERM CURRENT USE OF INSULIN: ICD-10-CM

## 2022-11-18 DIAGNOSIS — E78.5 HYPERLIPIDEMIA, UNSPECIFIED HYPERLIPIDEMIA TYPE: ICD-10-CM

## 2022-11-18 DIAGNOSIS — I10 ESSENTIAL HYPERTENSION: ICD-10-CM

## 2022-11-18 DIAGNOSIS — I48.0 PAROXYSMAL ATRIAL FIBRILLATION: Primary | ICD-10-CM

## 2022-11-18 DIAGNOSIS — N18.31 STAGE 3A CHRONIC KIDNEY DISEASE: ICD-10-CM

## 2022-11-18 DIAGNOSIS — E66.01 CLASS 2 SEVERE OBESITY DUE TO EXCESS CALORIES WITH SERIOUS COMORBIDITY AND BODY MASS INDEX (BMI) OF 35.0 TO 35.9 IN ADULT: ICD-10-CM

## 2022-11-18 LAB
ANION GAP SERPL CALC-SCNC: 8 MMOL/L (ref 8–16)
BUN SERPL-MCNC: 18 MG/DL (ref 8–23)
CALCIUM SERPL-MCNC: 8.9 MG/DL (ref 8.7–10.5)
CHLORIDE SERPL-SCNC: 106 MMOL/L (ref 95–110)
CO2 SERPL-SCNC: 27 MMOL/L (ref 23–29)
CREAT SERPL-MCNC: 1.5 MG/DL (ref 0.5–1.4)
EST. GFR  (NO RACE VARIABLE): 48 ML/MIN/1.73 M^2
GLUCOSE SERPL-MCNC: 82 MG/DL (ref 70–110)
POTASSIUM SERPL-SCNC: 3.8 MMOL/L (ref 3.5–5.1)
SODIUM SERPL-SCNC: 141 MMOL/L (ref 136–145)
T4 FREE SERPL-MCNC: 1.18 NG/DL (ref 0.71–1.51)
TSH SERPL DL<=0.005 MIU/L-ACNC: 7.24 UIU/ML (ref 0.4–4)

## 2022-11-18 PROCEDURE — 84443 ASSAY THYROID STIM HORMONE: CPT | Performed by: STUDENT IN AN ORGANIZED HEALTH CARE EDUCATION/TRAINING PROGRAM

## 2022-11-18 PROCEDURE — 93010 EKG 12-LEAD: ICD-10-PCS | Mod: S$GLB,,, | Performed by: INTERNAL MEDICINE

## 2022-11-18 PROCEDURE — 1101F PT FALLS ASSESS-DOCD LE1/YR: CPT | Mod: CPTII,S$GLB,, | Performed by: INTERNAL MEDICINE

## 2022-11-18 PROCEDURE — 84439 ASSAY OF FREE THYROXINE: CPT | Performed by: STUDENT IN AN ORGANIZED HEALTH CARE EDUCATION/TRAINING PROGRAM

## 2022-11-18 PROCEDURE — 1160F RVW MEDS BY RX/DR IN RCRD: CPT | Mod: CPTII,S$GLB,, | Performed by: INTERNAL MEDICINE

## 2022-11-18 PROCEDURE — 3078F DIAST BP <80 MM HG: CPT | Mod: CPTII,S$GLB,, | Performed by: INTERNAL MEDICINE

## 2022-11-18 PROCEDURE — 99999 PR PBB SHADOW E&M-EST. PATIENT-LVL III: CPT | Mod: PBBFAC,,, | Performed by: INTERNAL MEDICINE

## 2022-11-18 PROCEDURE — 99214 OFFICE O/P EST MOD 30 MIN: CPT | Mod: S$GLB,,, | Performed by: INTERNAL MEDICINE

## 2022-11-18 PROCEDURE — 3288F PR FALLS RISK ASSESSMENT DOCUMENTED: ICD-10-PCS | Mod: CPTII,S$GLB,, | Performed by: INTERNAL MEDICINE

## 2022-11-18 PROCEDURE — 99999 PR PBB SHADOW E&M-EST. PATIENT-LVL III: ICD-10-PCS | Mod: PBBFAC,,, | Performed by: INTERNAL MEDICINE

## 2022-11-18 PROCEDURE — 3075F PR MOST RECENT SYSTOLIC BLOOD PRESS GE 130-139MM HG: ICD-10-PCS | Mod: CPTII,S$GLB,, | Performed by: INTERNAL MEDICINE

## 2022-11-18 PROCEDURE — 36415 COLL VENOUS BLD VENIPUNCTURE: CPT | Performed by: STUDENT IN AN ORGANIZED HEALTH CARE EDUCATION/TRAINING PROGRAM

## 2022-11-18 PROCEDURE — 1159F MED LIST DOCD IN RCRD: CPT | Mod: CPTII,S$GLB,, | Performed by: INTERNAL MEDICINE

## 2022-11-18 PROCEDURE — 99214 PR OFFICE/OUTPT VISIT, EST, LEVL IV, 30-39 MIN: ICD-10-PCS | Mod: S$GLB,,, | Performed by: INTERNAL MEDICINE

## 2022-11-18 PROCEDURE — 1126F PR PAIN SEVERITY QUANTIFIED, NO PAIN PRESENT: ICD-10-PCS | Mod: CPTII,S$GLB,, | Performed by: INTERNAL MEDICINE

## 2022-11-18 PROCEDURE — 1126F AMNT PAIN NOTED NONE PRSNT: CPT | Mod: CPTII,S$GLB,, | Performed by: INTERNAL MEDICINE

## 2022-11-18 PROCEDURE — 3288F FALL RISK ASSESSMENT DOCD: CPT | Mod: CPTII,S$GLB,, | Performed by: INTERNAL MEDICINE

## 2022-11-18 PROCEDURE — 83036 HEMOGLOBIN GLYCOSYLATED A1C: CPT | Performed by: STUDENT IN AN ORGANIZED HEALTH CARE EDUCATION/TRAINING PROGRAM

## 2022-11-18 PROCEDURE — 1160F PR REVIEW ALL MEDS BY PRESCRIBER/CLIN PHARMACIST DOCUMENTED: ICD-10-PCS | Mod: CPTII,S$GLB,, | Performed by: INTERNAL MEDICINE

## 2022-11-18 PROCEDURE — 1101F PR PT FALLS ASSESS DOC 0-1 FALLS W/OUT INJ PAST YR: ICD-10-PCS | Mod: CPTII,S$GLB,, | Performed by: INTERNAL MEDICINE

## 2022-11-18 PROCEDURE — 1159F PR MEDICATION LIST DOCUMENTED IN MEDICAL RECORD: ICD-10-PCS | Mod: CPTII,S$GLB,, | Performed by: INTERNAL MEDICINE

## 2022-11-18 PROCEDURE — 3075F SYST BP GE 130 - 139MM HG: CPT | Mod: CPTII,S$GLB,, | Performed by: INTERNAL MEDICINE

## 2022-11-18 PROCEDURE — 93010 ELECTROCARDIOGRAM REPORT: CPT | Mod: S$GLB,,, | Performed by: INTERNAL MEDICINE

## 2022-11-18 PROCEDURE — 93005 ELECTROCARDIOGRAM TRACING: CPT

## 2022-11-18 PROCEDURE — 80048 BASIC METABOLIC PNL TOTAL CA: CPT | Performed by: STUDENT IN AN ORGANIZED HEALTH CARE EDUCATION/TRAINING PROGRAM

## 2022-11-18 PROCEDURE — 3078F PR MOST RECENT DIASTOLIC BLOOD PRESSURE < 80 MM HG: ICD-10-PCS | Mod: CPTII,S$GLB,, | Performed by: INTERNAL MEDICINE

## 2022-11-18 NOTE — PROGRESS NOTES
[facility] Cardiology     Subjective:    Patient ID:  Adan Bermudez is a 77 y.o. male who presents for follow-up of Bradycardia, Atrial Fibrillation, Hypertension, Coronary Artery Disease, and Hyperlipidemia    Review of patient's allergies indicates:   Allergen Reactions    Bee sting [allergen ext-venom-honey bee] Shortness Of Breath      The patient underwent an echocardiogram confirming normal ejection fraction.  The images were fair only.  He is still having a lot of dizziness.  I reduced his amiodarone dosing from 200 mg b.i.d. to 200 mg daily.  He seems to have less brain fog his wife states.  He is on Bystolic 5 mg as part of his hypertensive regimen.  He has chronic bradycardia based on my last visits heart rate, 53 beats per minute.  He does have balance problems chronically.  He remains in a wheelchair.  His wife states his blood pressures are stable at home.    2019 Lexiscan confirmed normal perfusion normal ejection fraction.  He has a calcium score greater than 400.  This was in 2019 as well.  He is on treatment for mixed hyperlipidemia.  He is diabetic.  He only had 1 episode of AFib.    The patient's electrocardiogram reviewed and independently interpreted by myself today confirms heart rate 50 sinus rhythm first-degree AV block possible LVH.  There are Q-waves in the diaphragmatic leads unchanged from 2019.      Review of Systems   Constitutional: Negative for chills, decreased appetite, diaphoresis, fever, malaise/fatigue, night sweats, weight gain and weight loss.   HENT:  Negative for congestion, ear discharge, ear pain, hearing loss, hoarse voice, nosebleeds, odynophagia, sore throat, stridor and tinnitus.    Eyes:  Negative for blurred vision, discharge, double vision, pain, photophobia, redness, vision loss in left eye, vision loss in right eye, visual disturbance and visual halos.   Cardiovascular:  Negative for chest  pain, claudication, cyanosis, dyspnea on exertion, irregular heartbeat, leg swelling, near-syncope, orthopnea, palpitations, paroxysmal nocturnal dyspnea and syncope.   Respiratory:  Negative for cough, hemoptysis, shortness of breath, sleep disturbances due to breathing, snoring, sputum production and wheezing.    Endocrine: Negative for cold intolerance, heat intolerance, polydipsia, polyphagia and polyuria.   Hematologic/Lymphatic: Negative for adenopathy and bleeding problem. Does not bruise/bleed easily.   Skin:  Negative for color change, dry skin, flushing, itching, nail changes, poor wound healing, rash, skin cancer, suspicious lesions and unusual hair distribution.   Musculoskeletal:  Positive for back pain. Negative for arthritis, falls, gout, joint pain, joint swelling, muscle cramps, muscle weakness, myalgias, neck pain and stiffness.   Gastrointestinal:  Negative for bloating, abdominal pain, anorexia, change in bowel habit, bowel incontinence, constipation, diarrhea, dysphagia, excessive appetite, flatus, heartburn, hematemesis, hematochezia, hemorrhoids, jaundice, melena, nausea and vomiting.   Genitourinary:  Negative for bladder incontinence, decreased libido, dysuria, flank pain, frequency, genital sores, hematuria, hesitancy, incomplete emptying, nocturia and urgency.   Neurological:  Positive for dizziness and loss of balance. Negative for aphonia, brief paralysis, difficulty with concentration, disturbances in coordination, excessive daytime sleepiness, focal weakness, headaches, light-headedness, numbness, paresthesias, seizures, sensory change, tremors, vertigo and weakness.   Psychiatric/Behavioral:  Negative for altered mental status, depression, hallucinations, memory loss, substance abuse, suicidal ideas and thoughts of violence. The patient does not have insomnia and is not nervous/anxious.    Allergic/Immunologic: Negative for hives and persistent infections.      Objective:      "  Vitals:    11/18/22 1043   BP: 132/62   Pulse: (!) 50   SpO2: 97%   Weight: 112.9 kg (249 lb)   Height: 5' 11" (1.803 m)    Physical Exam  Constitutional:       General: He is not in acute distress.     Appearance: He is well-developed. He is not diaphoretic.   HENT:      Head: Normocephalic and atraumatic.      Nose: Nose normal.   Eyes:      General: No scleral icterus.        Right eye: No discharge.      Conjunctiva/sclera: Conjunctivae normal.      Pupils: Pupils are equal, round, and reactive to light.   Neck:      Thyroid: No thyromegaly.      Vascular: No JVD.      Trachea: No tracheal deviation.   Cardiovascular:      Rate and Rhythm: Regular rhythm. Bradycardia present.      Pulses:           Carotid pulses are 2+ on the right side and 2+ on the left side.       Radial pulses are 2+ on the right side and 2+ on the left side.        Dorsalis pedis pulses are 1+ on the right side and 1+ on the left side.        Posterior tibial pulses are 1+ on the right side and 1+ on the left side.      Heart sounds: Normal heart sounds. No murmur heard.    No friction rub. No gallop.   Pulmonary:      Effort: Pulmonary effort is normal. No respiratory distress.      Breath sounds: Normal breath sounds. No stridor. No wheezing or rales.   Chest:      Chest wall: No tenderness.   Abdominal:      General: Bowel sounds are normal. There is no distension.      Palpations: Abdomen is soft. There is no mass.      Tenderness: There is no abdominal tenderness. There is no guarding or rebound.   Musculoskeletal:         General: No tenderness. Normal range of motion.      Cervical back: Normal range of motion and neck supple.   Lymphadenopathy:      Cervical: No cervical adenopathy.   Skin:     General: Skin is warm and dry.      Coloration: Skin is not pale.      Findings: No erythema or rash.   Neurological:      Mental Status: He is alert and oriented to person, place, and time.      Cranial Nerves: No cranial nerve deficit. "      Coordination: Coordination normal.   Psychiatric:         Behavior: Behavior normal.         Thought Content: Thought content normal.         Judgment: Judgment normal.         Assessment:       1. Paroxysmal atrial fibrillation    2. Essential hypertension    3. Hyperlipidemia, unspecified hyperlipidemia type    4. Stage 3a chronic kidney disease    5. Class 2 severe obesity due to excess calories with serious comorbidity and body mass index (BMI) of 35.0 to 35.9 in adult    6. Type 2 diabetes mellitus with stage 3a chronic kidney disease, with long-term current use of insulin    7. Coronary artery disease involving native coronary artery of native heart without angina pectoris      Results for orders placed or performed during the hospital encounter of 10/19/22   Echo   Result Value Ref Range    TDI SEPTAL 0.09 m/s    LV LATERAL E/E' RATIO 10.00 m/s    LV SEPTAL E/E' RATIO 7.78 m/s    Left Ventricular Outflow Tract Mean Velocity 0.74 cm/s    Left Ventricular Outflow Tract Mean Gradient 2.30 mmHg    Pulmonary Valve Mean Velocity 0.79 m/s    TDI LATERAL 0.07 m/s    PV PEAK VELOCITY 1.00 cm/s    LVIDd 5.20 (A) 3.5 - 6.0 cm    IVS 1.00 (A) 0.6 - 1.1 cm    Posterior Wall 1.00 (A) 0.6 - 1.1 cm    LVIDs 2.40 (A) 2.1 - 4.0 cm    FS 54 28 - 44 %    Sinus 4.01 cm    STJ 3.92 cm    Ascending aorta 3.91 cm    LV mass 194.16 g    LA size 5.46 cm    RVDD 3.20 cm    Left Ventricle Relative Wall Thickness 0.38 cm    AV mean gradient 2 mmHg    AV valve area 3.38 cm2    AV Velocity Ratio 0.92     AV index (prosthetic) 0.89     MV valve area p 1/2 method 2.48 cm2    E/A ratio 0.85     Mean e' 0.08 m/s    E wave deceleration time 305.92 msec    IVRT 148.43 msec    LVOT diameter 2.20 cm    LVOT area 3.8 cm2    LVOT peak jeremiah 0.90 m/s    LVOT peak VTI 21.00 cm    Ao peak jeremiah 0.98 m/s    Ao VTI 23.6 cm    RVOT peak jeremiah 1.00 m/s    RVOT peak VTI 25.4 cm    LVOT stroke volume 79.79 cm3    AV peak gradient 4 mmHg    PV mean gradient  2.59 mmHg    E/E' ratio 8.75 m/s    MV Peak E Ignacio 0.70 m/s    MV stenosis pressure 1/2 time 88.72 ms    MV Peak A Ignacio 0.82 m/s    LV Systolic Volume 119.44 mL    LV Diastolic Volume 210.33 mL    RA Major Axis 4.21 cm    Left Atrium Minor Axis 5.59 cm    Left Atrium Major Axis 5.81 cm    RA Width 3.55 cm    LA WIDTH 4.30 cm    Right Atrial Pressure (from IVC) 3 mmHg    EF 55 %    Ao root annulus 2.90 cm    LA volume 113.71 cm3    TV rest pulmonary artery pressure 26 mmHg    TR Max Ignacio 2.40 m/s    Triscuspid Valve Regurgitation Peak Gradient 23 mmHg         Current Outpatient Medications:     doxazosin (CARDURA) 2 MG tablet, Take 2 mg by mouth once daily., Disp: , Rfl:     ELIQUIS 5 mg Tab, Take 5 mg by mouth 2 (two) times daily., Disp: , Rfl:     EPINEPHrine (EPIPEN) 0.3 mg/0.3 mL AtIn, INJECT AS DIRECTED, Disp: , Rfl:     meclizine (ANTIVERT) 25 mg tablet, Take 25 mg by mouth 3 (three) times daily as needed., Disp: , Rfl:     metFORMIN (GLUCOPHAGE) 500 MG tablet, Take 500 mg by mouth once daily., Disp: , Rfl:     ramipriL (ALTACE) 10 MG capsule, Take 10 mg by mouth once daily., Disp: , Rfl:     rosuvastatin (CRESTOR) 20 MG tablet, Take 20 mg by mouth nightly., Disp: , Rfl:     torsemide (DEMADEX) 20 MG Tab, Take 20 mg by mouth once daily., Disp: , Rfl:      No results found for: WBC, RBC, HGB, HCT, MCV, MCH, MCHC, RDW, PLT, MPV, GRAN, LYMPH, MONO, EOS, BASO, EOSINOPHIL, BASOPHIL, MG     CMP  Lab Results   Component Value Date     11/18/2022    K 3.8 11/18/2022     11/18/2022    CO2 27 11/18/2022    GLU 82 11/18/2022    BUN 18 11/18/2022    CREATININE 1.5 (H) 11/18/2022    CALCIUM 8.9 11/18/2022    PROT 6.6 09/16/2022    ALBUMIN 3.3 (L) 09/16/2022    BILITOT 0.6 09/16/2022    ALKPHOS 69 09/16/2022    AST 31 09/16/2022    ALT 42 09/16/2022    ANIONGAP 8 11/18/2022        No results found for: LABBLOO, LABURIN, RESPIRATORYC, GSRESP         Results for orders placed or performed in visit on 11/18/22   EKG  12-lead    Collection Time: 11/18/22 10:37 AM    Narrative    Test Reason : I48.0,    Vent. Rate : 050 BPM     Atrial Rate : 050 BPM     P-R Int : 308 ms          QRS Dur : 100 ms      QT Int : 508 ms       P-R-T Axes : 078 -15 012 degrees     QTc Int : 463 ms    Sinus bradycardia with 1st degree A-V block  Minimal voltage criteria for LVH, may be normal variant ( R in aVL )  Abnormal ECG  No previous ECGs available  Confirmed by Osmany Weaver MD ([id number]) on 11/18/2022 11:36:50 AM    Referred By:             Confirmed By:Osmany Weaver MD                  Plan:       Problem List Items Addressed This Visit          Cardiac/Vascular    Paroxysmal atrial fibrillation - Primary     I recommend withdrawing amiodarone at this time.  By history he has only had 1 brief episode of atrial fibrillation.  Anticoagulation advised to continue long-term.    I believe amiodarone is contributing to his bradycardic rhythm.  Education provided to the patient and his wife regarding the management of his AFib at this time and usage of amiodarone.         Relevant Orders    EKG 12-lead (Completed)    HLD (hyperlipidemia)     Currently no labs for review.  Rosuvastatin 20 mg to continue.         Essential hypertension     Bystolic 5 mg discontinued today.  He will continue ramipril 10 mg, doxazosin 2 mg.         Coronary artery disease involving native coronary artery of native heart without angina pectoris     There is documentation of calcium score greater than 400 in the past.  He had a normal Lexiscan stress test in 2019.  He is asymptomatic chest pain wise.  Condition stable.            Renal/    Stage 3a chronic kidney disease     Demadex 20 mg to continue.            Endocrine    Type 2 diabetes mellitus with stage 3a chronic kidney disease, with long-term current use of insulin     He follows with Endocrine.  Condition unchanged.         Class 2 obesity in adult     Weight loss encouraged.                 I have stopped  Bystolic and amiodarone.  Will see him back in 4 months.  Most likely he will stay in sinus rhythm.  I discussed the pros and cons of amiodarone withdrawal.  I think the benefits of withdrawal outweigh the risks of recurrent AFib.           John Aguilar MD  11/19/2022   11:50 AM

## 2022-11-19 PROBLEM — I25.10 CORONARY ARTERY DISEASE INVOLVING NATIVE CORONARY ARTERY OF NATIVE HEART WITHOUT ANGINA PECTORIS: Status: ACTIVE | Noted: 2022-11-19

## 2022-11-19 LAB
ESTIMATED AVG GLUCOSE: 103 MG/DL (ref 68–131)
HBA1C MFR BLD: 5.2 % (ref 4–5.6)

## 2022-11-19 NOTE — ASSESSMENT & PLAN NOTE
There is documentation of calcium score greater than 400 in the past.  He had a normal Lexiscan stress test in 2019.  He is asymptomatic chest pain wise.  Condition stable.

## 2022-11-19 NOTE — ASSESSMENT & PLAN NOTE
I recommend withdrawing amiodarone at this time.  By history he has only had 1 brief episode of atrial fibrillation.  Anticoagulation advised to continue long-term.    I believe amiodarone is contributing to his bradycardic rhythm.  Education provided to the patient and his wife regarding the management of his AFib at this time and usage of amiodarone.

## 2022-11-21 ENCOUNTER — TELEPHONE (OUTPATIENT)
Dept: ENDOCRINOLOGY | Facility: CLINIC | Age: 77
End: 2022-11-21
Payer: MEDICARE

## 2022-11-21 DIAGNOSIS — E11.42 TYPE 2 DIABETES MELLITUS WITH DIABETIC POLYNEUROPATHY, WITHOUT LONG-TERM CURRENT USE OF INSULIN: Primary | ICD-10-CM

## 2022-11-22 ENCOUNTER — TELEPHONE (OUTPATIENT)
Dept: ENDOCRINOLOGY | Facility: CLINIC | Age: 77
End: 2022-11-22
Payer: MEDICARE

## 2022-11-23 DIAGNOSIS — E11.42 TYPE 2 DIABETES MELLITUS WITH DIABETIC POLYNEUROPATHY, WITHOUT LONG-TERM CURRENT USE OF INSULIN: Primary | ICD-10-CM

## 2022-11-23 RX ORDER — BLOOD-GLUCOSE TRANSMITTER
1 EACH MISCELLANEOUS ONCE
Qty: 1 EACH | Refills: 3 | Status: SHIPPED | OUTPATIENT
Start: 2022-11-23 | End: 2023-09-27

## 2022-11-23 RX ORDER — BLOOD-GLUCOSE,RECEIVER,CONT
1 EACH MISCELLANEOUS
Qty: 1 EACH | Refills: 0 | Status: SHIPPED | OUTPATIENT
Start: 2022-11-23 | End: 2023-09-27

## 2022-11-23 RX ORDER — BLOOD-GLUCOSE SENSOR
1 EACH MISCELLANEOUS
Qty: 3 EACH | Refills: 11 | Status: SHIPPED | OUTPATIENT
Start: 2022-11-23 | End: 2023-03-22 | Stop reason: SDUPTHER

## 2022-11-28 ENCOUNTER — TELEPHONE (OUTPATIENT)
Dept: ENDOCRINOLOGY | Facility: CLINIC | Age: 77
End: 2022-11-28
Payer: MEDICARE

## 2022-11-28 NOTE — TELEPHONE ENCOUNTER
[facility] approved DME supplies, through [employer] till 11/23/2023   Present, unchanged

## 2023-01-03 ENCOUNTER — TELEPHONE (OUTPATIENT)
Dept: ENDOCRINOLOGY | Facility: CLINIC | Age: 78
End: 2023-01-03
Payer: MEDICARE

## 2023-01-03 NOTE — TELEPHONE ENCOUNTER
----- Message from Amelia Nur sent at 1/3/2023 11:26 AM CST -----  Contact: michael/spouse  Adan Bermudez  MRN: [id number]  : 1945  PCP: Angus Ervin  Home Phone      [phone]  Work Phone      Not on file.  Mobile          [phone]      MESSAGE: Refill on blood-glucose sensor (DEXCOM G6 SENSOR) [place]  [facility]armacy [facility]  Fax# [phone]  Phone [phone]        Phone  [phone]

## 2023-01-23 ENCOUNTER — TELEPHONE (OUTPATIENT)
Dept: ORTHOPEDICS | Facility: CLINIC | Age: 78
End: 2023-01-23

## 2023-02-14 RX ORDER — APIXABAN 5 MG/1
5 TABLET, FILM COATED ORAL 2 TIMES DAILY
Qty: 180 TABLET | Refills: 0 | Status: SHIPPED | OUTPATIENT
Start: 2023-02-14 | End: 2023-05-12 | Stop reason: SDUPTHER

## 2023-03-06 ENCOUNTER — TELEPHONE (OUTPATIENT)
Dept: ENDOCRINOLOGY | Facility: CLINIC | Age: 78
End: 2023-03-06
Payer: MEDICARE

## 2023-03-06 NOTE — TELEPHONE ENCOUNTER
Tried contacting pt by phone 3 times, no answer, no voicemail,pt's supplier is [facility]: [place] [phone] pt must call them for any refills.         ----- Message from Neida Jewell sent at 3/6/2023 12:26 PM CST -----  Contact: pt  dAan Bermudez  MRN: [id number]  : 1945  PCP: Angus Ervin  Home Phone      [phone]  Work Phone      Not on file.  Mobile          [phone]      MESSAGE:     Pt is wanting to know what pharmacy his dexcom was sent to.      Please advise  [phone]

## 2023-03-22 ENCOUNTER — OFFICE VISIT (OUTPATIENT)
Dept: CARDIOLOGY | Facility: CLINIC | Age: 78
End: 2023-03-22
Payer: MEDICARE

## 2023-03-22 VITALS
DIASTOLIC BLOOD PRESSURE: 84 MMHG | BODY MASS INDEX: 34.17 KG/M2 | RESPIRATION RATE: 18 BRPM | HEIGHT: 71 IN | SYSTOLIC BLOOD PRESSURE: 140 MMHG | HEART RATE: 79 BPM | OXYGEN SATURATION: 99 % | WEIGHT: 244.06 LBS

## 2023-03-22 DIAGNOSIS — I48.0 PAROXYSMAL ATRIAL FIBRILLATION: ICD-10-CM

## 2023-03-22 DIAGNOSIS — N18.31 STAGE 3A CHRONIC KIDNEY DISEASE: ICD-10-CM

## 2023-03-22 DIAGNOSIS — N18.31 TYPE 2 DIABETES MELLITUS WITH STAGE 3A CHRONIC KIDNEY DISEASE, WITHOUT LONG-TERM CURRENT USE OF INSULIN: ICD-10-CM

## 2023-03-22 DIAGNOSIS — E66.01 CLASS 2 SEVERE OBESITY DUE TO EXCESS CALORIES WITH SERIOUS COMORBIDITY AND BODY MASS INDEX (BMI) OF 35.0 TO 35.9 IN ADULT: ICD-10-CM

## 2023-03-22 DIAGNOSIS — E78.5 HYPERLIPIDEMIA, UNSPECIFIED HYPERLIPIDEMIA TYPE: ICD-10-CM

## 2023-03-22 DIAGNOSIS — I10 ESSENTIAL HYPERTENSION: ICD-10-CM

## 2023-03-22 DIAGNOSIS — E11.22 TYPE 2 DIABETES MELLITUS WITH STAGE 3A CHRONIC KIDNEY DISEASE, WITHOUT LONG-TERM CURRENT USE OF INSULIN: ICD-10-CM

## 2023-03-22 DIAGNOSIS — I25.10 CORONARY ARTERY DISEASE INVOLVING NATIVE CORONARY ARTERY OF NATIVE HEART WITHOUT ANGINA PECTORIS: ICD-10-CM

## 2023-03-22 PROCEDURE — 3288F PR FALLS RISK ASSESSMENT DOCUMENTED: ICD-10-PCS | Mod: CPTII,S$GLB,, | Performed by: INTERNAL MEDICINE

## 2023-03-22 PROCEDURE — 99214 OFFICE O/P EST MOD 30 MIN: CPT | Mod: S$GLB,,, | Performed by: INTERNAL MEDICINE

## 2023-03-22 PROCEDURE — 3079F DIAST BP 80-89 MM HG: CPT | Mod: CPTII,S$GLB,, | Performed by: INTERNAL MEDICINE

## 2023-03-22 PROCEDURE — 1159F PR MEDICATION LIST DOCUMENTED IN MEDICAL RECORD: ICD-10-PCS | Mod: CPTII,S$GLB,, | Performed by: INTERNAL MEDICINE

## 2023-03-22 PROCEDURE — 3288F FALL RISK ASSESSMENT DOCD: CPT | Mod: CPTII,S$GLB,, | Performed by: INTERNAL MEDICINE

## 2023-03-22 PROCEDURE — 1101F PR PT FALLS ASSESS DOC 0-1 FALLS W/OUT INJ PAST YR: ICD-10-PCS | Mod: CPTII,S$GLB,, | Performed by: INTERNAL MEDICINE

## 2023-03-22 PROCEDURE — 1160F RVW MEDS BY RX/DR IN RCRD: CPT | Mod: CPTII,S$GLB,, | Performed by: INTERNAL MEDICINE

## 2023-03-22 PROCEDURE — 99214 PR OFFICE/OUTPT VISIT, EST, LEVL IV, 30-39 MIN: ICD-10-PCS | Mod: S$GLB,,, | Performed by: INTERNAL MEDICINE

## 2023-03-22 PROCEDURE — 1126F PR PAIN SEVERITY QUANTIFIED, NO PAIN PRESENT: ICD-10-PCS | Mod: CPTII,S$GLB,, | Performed by: INTERNAL MEDICINE

## 2023-03-22 PROCEDURE — 1160F PR REVIEW ALL MEDS BY PRESCRIBER/CLIN PHARMACIST DOCUMENTED: ICD-10-PCS | Mod: CPTII,S$GLB,, | Performed by: INTERNAL MEDICINE

## 2023-03-22 PROCEDURE — 3079F PR MOST RECENT DIASTOLIC BLOOD PRESSURE 80-89 MM HG: ICD-10-PCS | Mod: CPTII,S$GLB,, | Performed by: INTERNAL MEDICINE

## 2023-03-22 PROCEDURE — 99999 PR PBB SHADOW E&M-EST. PATIENT-LVL III: CPT | Mod: PBBFAC,,, | Performed by: INTERNAL MEDICINE

## 2023-03-22 PROCEDURE — 99999 PR PBB SHADOW E&M-EST. PATIENT-LVL III: ICD-10-PCS | Mod: PBBFAC,,, | Performed by: INTERNAL MEDICINE

## 2023-03-22 PROCEDURE — 1101F PT FALLS ASSESS-DOCD LE1/YR: CPT | Mod: CPTII,S$GLB,, | Performed by: INTERNAL MEDICINE

## 2023-03-22 PROCEDURE — 3077F SYST BP >= 140 MM HG: CPT | Mod: CPTII,S$GLB,, | Performed by: INTERNAL MEDICINE

## 2023-03-22 PROCEDURE — 3077F PR MOST RECENT SYSTOLIC BLOOD PRESSURE >= 140 MM HG: ICD-10-PCS | Mod: CPTII,S$GLB,, | Performed by: INTERNAL MEDICINE

## 2023-03-22 PROCEDURE — 1126F AMNT PAIN NOTED NONE PRSNT: CPT | Mod: CPTII,S$GLB,, | Performed by: INTERNAL MEDICINE

## 2023-03-22 PROCEDURE — 1159F MED LIST DOCD IN RCRD: CPT | Mod: CPTII,S$GLB,, | Performed by: INTERNAL MEDICINE

## 2023-03-22 NOTE — PROGRESS NOTES
[facility] Cardiology     Subjective:    Patient ID:  Adan Bermudez is a 77 y.o. male who presents for follow-up of Hypertension, Hyperlipidemia, Atrial Fibrillation, Coronary Artery Disease, and Diabetes Mellitus    Review of patient's allergies indicates:   Allergen Reactions    Bee sting [allergen ext-venom-honey bee] Shortness Of Breath      He reports that he is doing much better from a functional standpoint with less balance problems and no falls.  He still walks with a walker.  He has not had any chest pain or shortness of breath or change in exercise tolerance.  He does have CKD 3A status, he is a diabetic.  Most recent A1c 5.2.    He has paroxysmal AFib.  He only had 1 episode and was taking amiodarone for a couple of years.  Bystolic was discontinued, amiodarone discontinued related to bradycardia and dizziness.  His wife confirms that he is doing much better from a functional standpoint.  He confirms that as well.  He is on Eliquis.  He has never felt palpitations.  His echo confirmed normal ejection fraction in 2022.    He is on Altace 10 mg Cardura 2 mg for hypertension.  Today's blood pressure mildly elevated at 140/84 mmHg, but she states that her his blood pressures have been better at home on a regular basis in the mid 120 systolic range.  He has not had any bleeding problems.  He gets labs on a regular basis by his regular physician.  He remains on rosuvastatin for hyperlipidemia.  He does take Demadex 20 mg daily.  Most recent serum creatinine 1.5 range.  No leg swelling reported.      Review of Systems   Constitutional: Negative for chills, decreased appetite, diaphoresis, fever, malaise/fatigue, night sweats, weight gain and weight loss.   HENT:  Negative for congestion, ear discharge, ear pain, hearing loss, hoarse voice, nosebleeds, odynophagia, sore throat, stridor and tinnitus.    Eyes:  Negative for blurred vision,  discharge, double vision, pain, photophobia, redness, vision loss in left eye, vision loss in right eye, visual disturbance and visual halos.   Cardiovascular:  Negative for chest pain, claudication, cyanosis, dyspnea on exertion, irregular heartbeat, leg swelling, near-syncope, orthopnea, palpitations, paroxysmal nocturnal dyspnea and syncope.   Respiratory:  Negative for cough, hemoptysis, shortness of breath, sleep disturbances due to breathing, snoring, sputum production and wheezing.    Endocrine: Negative for cold intolerance, heat intolerance, polydipsia, polyphagia and polyuria.   Hematologic/Lymphatic: Negative for adenopathy and bleeding problem. Does not bruise/bleed easily.   Skin:  Negative for color change, dry skin, flushing, itching, nail changes, poor wound healing, rash, skin cancer, suspicious lesions and unusual hair distribution.   Musculoskeletal:  Positive for joint pain. Negative for arthritis, back pain, falls, gout, joint swelling, muscle cramps, muscle weakness, myalgias, neck pain and stiffness.   Gastrointestinal:  Negative for bloating, abdominal pain, anorexia, change in bowel habit, bowel incontinence, constipation, diarrhea, dysphagia, excessive appetite, flatus, heartburn, hematemesis, hematochezia, hemorrhoids, jaundice, melena, nausea and vomiting.   Genitourinary:  Negative for bladder incontinence, decreased libido, dysuria, flank pain, frequency, genital sores, hematuria, hesitancy, incomplete emptying, nocturia and urgency.   Neurological:  Positive for headaches and loss of balance. Negative for aphonia, brief paralysis, difficulty with concentration, disturbances in coordination, excessive daytime sleepiness, dizziness, focal weakness, light-headedness, numbness, paresthesias, seizures, sensory change, tremors, vertigo and weakness.   Psychiatric/Behavioral:  Negative for altered mental status, depression, hallucinations, memory loss, substance abuse, suicidal ideas and  "thoughts of violence. The patient is nervous/anxious. The patient does not have insomnia.    Allergic/Immunologic: Negative for hives and persistent infections.      Objective:       Vitals:    03/22/23 1416   BP: (!) 140/84   Pulse: 79   Resp: 18   SpO2: 99%   Weight: 110.7 kg (244 lb 0.8 oz)   Height: 5' 11" (1.803 m)    Physical Exam  Constitutional:       General: He is not in acute distress.     Appearance: He is well-developed. He is not diaphoretic.   HENT:      Head: Normocephalic and atraumatic.      Nose: Nose normal.   Eyes:      General: No scleral icterus.        Right eye: No discharge.      Conjunctiva/sclera: Conjunctivae normal.      Pupils: Pupils are equal, round, and reactive to light.   Neck:      Thyroid: No thyromegaly.      Vascular: No JVD.      Trachea: No tracheal deviation.   Cardiovascular:      Rate and Rhythm: Normal rate and regular rhythm.      Pulses:           Carotid pulses are 2+ on the right side and 2+ on the left side.       Radial pulses are 2+ on the right side and 2+ on the left side.        Dorsalis pedis pulses are 1+ on the right side and 1+ on the left side.        Posterior tibial pulses are 1+ on the right side and 1+ on the left side.      Heart sounds: Normal heart sounds. No murmur heard.    No friction rub. No gallop.   Pulmonary:      Effort: Pulmonary effort is normal. No respiratory distress.      Breath sounds: Normal breath sounds. No stridor. No wheezing or rales.   Chest:      Chest wall: No tenderness.   Abdominal:      General: Bowel sounds are normal. There is no distension.      Palpations: Abdomen is soft. There is no mass.      Tenderness: There is no abdominal tenderness. There is no guarding or rebound.   Musculoskeletal:         General: No tenderness. Normal range of motion.      Cervical back: Normal range of motion and neck supple.   Lymphadenopathy:      Cervical: No cervical adenopathy.   Skin:     General: Skin is warm and dry.      " Coloration: Skin is not pale.      Findings: No erythema or rash.   Neurological:      Mental Status: He is alert and oriented to person, place, and time.      Cranial Nerves: No cranial nerve deficit.      Coordination: Coordination normal.   Psychiatric:         Behavior: Behavior normal.         Thought Content: Thought content normal.         Judgment: Judgment normal.         Assessment:       1. Paroxysmal atrial fibrillation    2. Essential hypertension    3. Coronary artery disease involving native coronary artery of native heart without angina pectoris    4. Stage 3a chronic kidney disease    5. Type 2 diabetes mellitus with stage 3a chronic kidney disease, without long-term current use of insulin    6. Class 2 severe obesity due to excess calories with serious comorbidity and body mass index (BMI) of 35.0 to 35.9 in adult    7. Hyperlipidemia, unspecified hyperlipidemia type      Results for orders placed or performed in visit on 11/18/22   Hemoglobin A1C   Result Value Ref Range    Hemoglobin A1C 5.2 4.0 - 5.6 %    Estimated Avg Glucose 103 68 - 131 mg/dL   TSH   Result Value Ref Range    TSH 7.243 (H) 0.400 - 4.000 uIU/mL   Basic Metabolic Panel   Result Value Ref Range    Sodium 141 136 - 145 mmol/L    Potassium 3.8 3.5 - 5.1 mmol/L    Chloride 106 95 - 110 mmol/L    CO2 27 23 - 29 mmol/L    Glucose 82 70 - 110 mg/dL    BUN 18 8 - 23 mg/dL    Creatinine 1.5 (H) 0.5 - 1.4 mg/dL    Calcium 8.9 8.7 - 10.5 mg/dL    Anion Gap 8 8 - 16 mmol/L    eGFR 48 (A) >60 mL/min/1.73 m^2   T4, Free   Result Value Ref Range    Free T4 1.18 0.71 - 1.51 ng/dL         Current Outpatient Medications:     blood-glucose meter,continuous (DEXCOM G6 ) Misc, 1 each by Misc.(Non-Drug; Combo Route) route every 3 (three) months., Disp: 1 each, Rfl: 0    doxazosin (CARDURA) 2 MG tablet, Take 2 mg by mouth once daily., Disp: , Rfl:     ELIQUIS 5 mg Tab, Take 1 tablet (5 mg total) by mouth 2 (two) times daily., Disp: 180  tablet, Rfl: 0    EPINEPHrine (EPIPEN) 0.3 mg/0.3 mL AtIn, INJECT AS DIRECTED, Disp: , Rfl:     meclizine (ANTIVERT) 25 mg tablet, Take 25 mg by mouth 3 (three) times daily as needed., Disp: , Rfl:     ramipriL (ALTACE) 10 MG capsule, Take 10 mg by mouth once daily., Disp: , Rfl:     rosuvastatin (CRESTOR) 20 MG tablet, Take 20 mg by mouth nightly., Disp: , Rfl:     torsemide (DEMADEX) 20 MG Tab, Take 20 mg by mouth once daily., Disp: , Rfl:     blood-glucose transmitter (DEXCOM G6 TRANSMITTER) Julia, 1 each by Misc.(Non-Drug; Combo Route) route once. for 1 dose, Disp: 1 each, Rfl: 3     No results found for: WBC, RBC, HGB, HCT, MCV, MCH, MCHC, RDW, PLT, MPV, GRAN, LYMPH, MONO, EOS, BASO, EOSINOPHIL, BASOPHIL, MG     CMP  Lab Results   Component Value Date     03/23/2023    K 3.7 03/23/2023     03/23/2023    CO2 27 03/23/2023    GLU 98 03/23/2023    BUN 29 (H) 03/23/2023    CREATININE 1.5 (H) 03/23/2023    CALCIUM 9.0 03/23/2023    PROT 6.6 09/16/2022    ALBUMIN 3.3 (L) 09/16/2022    BILITOT 0.6 09/16/2022    ALKPHOS 69 09/16/2022    AST 31 09/16/2022    ALT 42 09/16/2022    ANIONGAP 9 03/23/2023        No results found for: LABBLOO, LABURIN, RESPIRATORYC, GSRESP         Results for orders placed or performed in visit on 11/18/22   EKG 12-lead    Collection Time: 11/18/22 10:37 AM    Narrative    Test Reason : I48.0,    Vent. Rate : 050 BPM     Atrial Rate : 050 BPM     P-R Int : 308 ms          QRS Dur : 100 ms      QT Int : 508 ms       P-R-T Axes : 078 -15 012 degrees     QTc Int : 463 ms    Sinus bradycardia with 1st degree A-V block  Minimal voltage criteria for LVH, may be normal variant ( R in aVL )  Abnormal ECG  No previous ECGs available  Confirmed by Osmany Weaver MD ([id number]) on 11/18/2022 11:36:50 AM    Referred By:             Confirmed By:Osmany Weaver MD                  Plan:       Problem List Items Addressed This Visit          Cardiac/Vascular    Paroxysmal atrial fibrillation      Eliquis will be continued.  Education provided to the patient and his wife regarding heart rates with AFib and how to monitor for recurrence.    Amiodarone withdrawn November 2022.  He remains in sinus rhythm.  Balance issues have improved.         HLD (hyperlipidemia)     He will continue rosuvastatin 20 mg per day.  His primary physician draws labs, I am unable to review.         Essential hypertension     His current regimen will be continued.  Bystolic withdrawn November 2022.  He remains on Altace Cardura.  He will continue to monitor his readings at home.         Coronary artery disease involving native coronary artery of native heart without angina pectoris     Normal Lexiscan 2019.  No active angina.  He has an elevated calcium score.  Condition stable.            Renal/    Stage 3a chronic kidney disease     Condition stable.  He is on furosemide 20 mg daily.            Endocrine    Type 2 diabetes mellitus with stage 3a chronic kidney disease, without long-term current use of insulin     Most recent A1c 5.2.  Condition controlled.         Class 2 obesity in adult     Exercise and weight loss encouraged.             As long as he is on anticoagulation his risk of stroke is low.  He has benefitted from withdrawal of Bystolic and amiodarone.  I told him that likely he could resume Bystolic and he would not have the same degree of balance problems and risk of falls that he had when he was on amiodarone.  I think he was having neurologic complications of amiodarone.    He has not had recurrence of AFib.  I will see him back in 4 months.               John Aguilar MD  03/23/2023   2:57 PM

## 2023-03-23 ENCOUNTER — LAB VISIT (OUTPATIENT)
Dept: LAB | Facility: HOSPITAL | Age: 78
End: 2023-03-23
Attending: STUDENT IN AN ORGANIZED HEALTH CARE EDUCATION/TRAINING PROGRAM
Payer: MEDICARE

## 2023-03-23 DIAGNOSIS — E11.42 TYPE 2 DIABETES MELLITUS WITH DIABETIC POLYNEUROPATHY, WITHOUT LONG-TERM CURRENT USE OF INSULIN: ICD-10-CM

## 2023-03-23 LAB
ANION GAP SERPL CALC-SCNC: 9 MMOL/L (ref 8–16)
BUN SERPL-MCNC: 29 MG/DL (ref 8–23)
CALCIUM SERPL-MCNC: 9 MG/DL (ref 8.7–10.5)
CHLORIDE SERPL-SCNC: 107 MMOL/L (ref 95–110)
CO2 SERPL-SCNC: 27 MMOL/L (ref 23–29)
CREAT SERPL-MCNC: 1.5 MG/DL (ref 0.5–1.4)
EST. GFR  (NO RACE VARIABLE): 48 ML/MIN/1.73 M^2
ESTIMATED AVG GLUCOSE: 120 MG/DL (ref 68–131)
GLUCOSE SERPL-MCNC: 98 MG/DL (ref 70–110)
HBA1C MFR BLD: 5.8 % (ref 4–5.6)
POTASSIUM SERPL-SCNC: 3.7 MMOL/L (ref 3.5–5.1)
SODIUM SERPL-SCNC: 143 MMOL/L (ref 136–145)
T4 FREE SERPL-MCNC: 1.14 NG/DL (ref 0.71–1.51)
TSH SERPL DL<=0.005 MIU/L-ACNC: 6.62 UIU/ML (ref 0.4–4)

## 2023-03-23 PROCEDURE — 83036 HEMOGLOBIN GLYCOSYLATED A1C: CPT | Performed by: STUDENT IN AN ORGANIZED HEALTH CARE EDUCATION/TRAINING PROGRAM

## 2023-03-23 PROCEDURE — 84439 ASSAY OF FREE THYROXINE: CPT | Performed by: STUDENT IN AN ORGANIZED HEALTH CARE EDUCATION/TRAINING PROGRAM

## 2023-03-23 PROCEDURE — 80048 BASIC METABOLIC PNL TOTAL CA: CPT | Performed by: STUDENT IN AN ORGANIZED HEALTH CARE EDUCATION/TRAINING PROGRAM

## 2023-03-23 PROCEDURE — 36415 COLL VENOUS BLD VENIPUNCTURE: CPT | Performed by: STUDENT IN AN ORGANIZED HEALTH CARE EDUCATION/TRAINING PROGRAM

## 2023-03-23 PROCEDURE — 84443 ASSAY THYROID STIM HORMONE: CPT | Performed by: STUDENT IN AN ORGANIZED HEALTH CARE EDUCATION/TRAINING PROGRAM

## 2023-03-23 NOTE — ASSESSMENT & PLAN NOTE
He will continue rosuvastatin 20 mg per day.  His primary physician draws labs, I am unable to review.

## 2023-03-23 NOTE — ASSESSMENT & PLAN NOTE
His current regimen will be continued.  Bystolic withdrawn November 2022.  He remains on Altace Cardura.  He will continue to monitor his readings at home.

## 2023-03-23 NOTE — ASSESSMENT & PLAN NOTE
Eliquis will be continued.  Education provided to the patient and his wife regarding heart rates with AFib and how to monitor for recurrence.    Amiodarone withdrawn November 2022.  He remains in sinus rhythm.  Balance issues have improved.

## 2023-03-27 ENCOUNTER — OFFICE VISIT (OUTPATIENT)
Dept: ENDOCRINOLOGY | Facility: CLINIC | Age: 78
End: 2023-03-27
Payer: MEDICARE

## 2023-03-27 VITALS
WEIGHT: 249.13 LBS | RESPIRATION RATE: 16 BRPM | DIASTOLIC BLOOD PRESSURE: 78 MMHG | BODY MASS INDEX: 34.88 KG/M2 | SYSTOLIC BLOOD PRESSURE: 132 MMHG | HEART RATE: 64 BPM | HEIGHT: 71 IN

## 2023-03-27 DIAGNOSIS — E11.22 TYPE 2 DIABETES MELLITUS WITH STAGE 3A CHRONIC KIDNEY DISEASE, WITH LONG-TERM CURRENT USE OF INSULIN: ICD-10-CM

## 2023-03-27 DIAGNOSIS — Z79.4 TYPE 2 DIABETES MELLITUS WITH STAGE 3A CHRONIC KIDNEY DISEASE, WITH LONG-TERM CURRENT USE OF INSULIN: ICD-10-CM

## 2023-03-27 DIAGNOSIS — N18.31 TYPE 2 DIABETES MELLITUS WITH STAGE 3A CHRONIC KIDNEY DISEASE, WITH LONG-TERM CURRENT USE OF INSULIN: ICD-10-CM

## 2023-03-27 DIAGNOSIS — I10 ESSENTIAL HYPERTENSION: ICD-10-CM

## 2023-03-27 DIAGNOSIS — E78.2 MIXED HYPERLIPIDEMIA: ICD-10-CM

## 2023-03-27 DIAGNOSIS — N18.31 STAGE 3A CHRONIC KIDNEY DISEASE: ICD-10-CM

## 2023-03-27 DIAGNOSIS — R79.89 ELEVATED TSH: ICD-10-CM

## 2023-03-27 DIAGNOSIS — I48.0 PAROXYSMAL ATRIAL FIBRILLATION: ICD-10-CM

## 2023-03-27 PROCEDURE — 99214 OFFICE O/P EST MOD 30 MIN: CPT | Mod: 25,S$GLB,, | Performed by: STUDENT IN AN ORGANIZED HEALTH CARE EDUCATION/TRAINING PROGRAM

## 2023-03-27 PROCEDURE — 1160F PR REVIEW ALL MEDS BY PRESCRIBER/CLIN PHARMACIST DOCUMENTED: ICD-10-PCS | Mod: CPTII,S$GLB,, | Performed by: STUDENT IN AN ORGANIZED HEALTH CARE EDUCATION/TRAINING PROGRAM

## 2023-03-27 PROCEDURE — 3288F PR FALLS RISK ASSESSMENT DOCUMENTED: ICD-10-PCS | Mod: CPTII,S$GLB,, | Performed by: STUDENT IN AN ORGANIZED HEALTH CARE EDUCATION/TRAINING PROGRAM

## 2023-03-27 PROCEDURE — 95251 PR GLUCOSE MONITOR, 72 HOUR, PHYS INTERP: ICD-10-PCS | Mod: S$GLB,,, | Performed by: STUDENT IN AN ORGANIZED HEALTH CARE EDUCATION/TRAINING PROGRAM

## 2023-03-27 PROCEDURE — 3075F PR MOST RECENT SYSTOLIC BLOOD PRESS GE 130-139MM HG: ICD-10-PCS | Mod: CPTII,S$GLB,, | Performed by: STUDENT IN AN ORGANIZED HEALTH CARE EDUCATION/TRAINING PROGRAM

## 2023-03-27 PROCEDURE — 1126F AMNT PAIN NOTED NONE PRSNT: CPT | Mod: CPTII,S$GLB,, | Performed by: STUDENT IN AN ORGANIZED HEALTH CARE EDUCATION/TRAINING PROGRAM

## 2023-03-27 PROCEDURE — 3075F SYST BP GE 130 - 139MM HG: CPT | Mod: CPTII,S$GLB,, | Performed by: STUDENT IN AN ORGANIZED HEALTH CARE EDUCATION/TRAINING PROGRAM

## 2023-03-27 PROCEDURE — 3078F PR MOST RECENT DIASTOLIC BLOOD PRESSURE < 80 MM HG: ICD-10-PCS | Mod: CPTII,S$GLB,, | Performed by: STUDENT IN AN ORGANIZED HEALTH CARE EDUCATION/TRAINING PROGRAM

## 2023-03-27 PROCEDURE — 95251 CONT GLUC MNTR ANALYSIS I&R: CPT | Mod: S$GLB,,, | Performed by: STUDENT IN AN ORGANIZED HEALTH CARE EDUCATION/TRAINING PROGRAM

## 2023-03-27 PROCEDURE — 1126F PR PAIN SEVERITY QUANTIFIED, NO PAIN PRESENT: ICD-10-PCS | Mod: CPTII,S$GLB,, | Performed by: STUDENT IN AN ORGANIZED HEALTH CARE EDUCATION/TRAINING PROGRAM

## 2023-03-27 PROCEDURE — 99999 PR PBB SHADOW E&M-EST. PATIENT-LVL III: CPT | Mod: PBBFAC,,, | Performed by: STUDENT IN AN ORGANIZED HEALTH CARE EDUCATION/TRAINING PROGRAM

## 2023-03-27 PROCEDURE — 1159F PR MEDICATION LIST DOCUMENTED IN MEDICAL RECORD: ICD-10-PCS | Mod: CPTII,S$GLB,, | Performed by: STUDENT IN AN ORGANIZED HEALTH CARE EDUCATION/TRAINING PROGRAM

## 2023-03-27 PROCEDURE — 1101F PR PT FALLS ASSESS DOC 0-1 FALLS W/OUT INJ PAST YR: ICD-10-PCS | Mod: CPTII,S$GLB,, | Performed by: STUDENT IN AN ORGANIZED HEALTH CARE EDUCATION/TRAINING PROGRAM

## 2023-03-27 PROCEDURE — 99999 PR PBB SHADOW E&M-EST. PATIENT-LVL III: ICD-10-PCS | Mod: PBBFAC,,, | Performed by: STUDENT IN AN ORGANIZED HEALTH CARE EDUCATION/TRAINING PROGRAM

## 2023-03-27 PROCEDURE — 1159F MED LIST DOCD IN RCRD: CPT | Mod: CPTII,S$GLB,, | Performed by: STUDENT IN AN ORGANIZED HEALTH CARE EDUCATION/TRAINING PROGRAM

## 2023-03-27 PROCEDURE — 1101F PT FALLS ASSESS-DOCD LE1/YR: CPT | Mod: CPTII,S$GLB,, | Performed by: STUDENT IN AN ORGANIZED HEALTH CARE EDUCATION/TRAINING PROGRAM

## 2023-03-27 PROCEDURE — 1160F RVW MEDS BY RX/DR IN RCRD: CPT | Mod: CPTII,S$GLB,, | Performed by: STUDENT IN AN ORGANIZED HEALTH CARE EDUCATION/TRAINING PROGRAM

## 2023-03-27 PROCEDURE — 99214 PR OFFICE/OUTPT VISIT, EST, LEVL IV, 30-39 MIN: ICD-10-PCS | Mod: 25,S$GLB,, | Performed by: STUDENT IN AN ORGANIZED HEALTH CARE EDUCATION/TRAINING PROGRAM

## 2023-03-27 PROCEDURE — 3288F FALL RISK ASSESSMENT DOCD: CPT | Mod: CPTII,S$GLB,, | Performed by: STUDENT IN AN ORGANIZED HEALTH CARE EDUCATION/TRAINING PROGRAM

## 2023-03-27 PROCEDURE — 3078F DIAST BP <80 MM HG: CPT | Mod: CPTII,S$GLB,, | Performed by: STUDENT IN AN ORGANIZED HEALTH CARE EDUCATION/TRAINING PROGRAM

## 2023-03-27 NOTE — PROGRESS NOTES
"Subjective:      Patient ID: Adan Bermudez is a 77 y.o. male.    Chief Complaint:  Type 2 diabetes mellitus    History of Present Illness  This is a 77 y.o. male. with a past medical history of type 2 diabetes mellitus, HTN, Afib here for follow up    Type 2 diabetes mellitus    Current diabetes medications:  - Novolog SSI 4 times daily    INSULIN CORRECTION SCALE    Glucose                 Insulin           181-200               +2 units                     201-250               +4 units                      251-300               +6 units                     301-350               +8 units                      >350                    +10 units                       Past diabetes medications:  - Metformin    Lab Results   Component Value Date    CREATININE 1.5 (H) 03/23/2023    EGFRNORACEVR 48 (A) 03/23/2023       Known diabetic complications: nephropathy    Weight:  Wt Readings from Last 6 Encounters:   03/27/23 113 kg (249 lb 1.9 oz)   03/22/23 110.7 kg (244 lb 0.8 oz)   11/18/22 112.9 kg (249 lb)   11/16/22 113 kg (249 lb 1.9 oz)   09/27/22 115 kg (253 lb 8.5 oz)   09/02/22 116.1 kg (255 lb 15.3 oz)         Family history: Mother side    Blood glucose monitoring at home:       Diabetes Management Status  Statin: Taking  ACE/ARB: Taking    Diabetes Management Status  Statin: Taking  ACE/ARB: Taking    Screening or Prevention Patient's value   HgA1C Testing and Control   Lab Results   Component Value Date    HGBA1C 5.8 (H) 03/23/2023        LDL control No results found for: LDLCALC   Nephropathy screening No results found for: MICALBCREAT         Outside labs  6/1/22  A1c 7.2%    Lab Results   Component Value Date    TSH 6.621 (H) 03/23/2023         Review of Systems  As above    Social and family history reviewed  Current medications and allergies reviewed    Objective:   /78 (BP Location: Right arm, Patient Position: Sitting, BP Method: Medium (Manual))   Pulse 64   Resp 16   Ht 5' 11" (1.803 m)   " Wt 113 kg (249 lb 1.9 oz)   BMI 34.75 kg/m²   Physical Exam  Alert, oriented    BP Readings from Last 1 Encounters:   03/27/23 132/78      Wt Readings from Last 1 Encounters:   03/27/23 0810 113 kg (249 lb 1.9 oz)     Body mass index is 34.75 kg/m².    Lab Review:   Lab Results   Component Value Date    HGBA1C 5.8 (H) 03/23/2023     No results found for: CHOL, HDL, LDLCALC, TRIG, CHOLHDL  Lab Results   Component Value Date     03/23/2023    K 3.7 03/23/2023     03/23/2023    CO2 27 03/23/2023    GLU 98 03/23/2023    BUN 29 (H) 03/23/2023    CREATININE 1.5 (H) 03/23/2023    CALCIUM 9.0 03/23/2023    PROT 6.6 09/16/2022    ALBUMIN 3.3 (L) 09/16/2022    BILITOT 0.6 09/16/2022    ALKPHOS 69 09/16/2022    AST 31 09/16/2022    ALT 42 09/16/2022    ANIONGAP 9 03/23/2023    TSH 6.621 (H) 03/23/2023       All pertinent labs reviewed    Assessment and Plan     Type 2 diabetes mellitus with stage 3a chronic kidney disease, with long-term current use of insulin  Glucose controlled with use of SSI Novolog only with minimal requirements.    Continue same regimen.     Plan  - Continue Novolog SSI 4 times daily  - Continue Dexcom G6    F/u 6 months      Elevated TSH  TSH 6.6 which is at goal for his age and history of atrial fibrillation  Use of levothyroxine should be avoided at this time  Amiodarone discontinued Nov 2022 by Cardiology  Will monitor TSH periodically  Keep TSH ~ 3-8    Essential hypertension  Continue antihypertensive regimen including ARB/ACEi    HLD (hyperlipidemia)  Continue statin    Paroxysmal atrial fibrillation  Amiodarone discontinued Nov 2022 by Cardiology  Keep TSH ~ 3-8    Stage 3a chronic kidney disease  Monitor GFR periodically        Follow-up in 6 months    Jose Wilcox MD  Endocrinology

## 2023-03-27 NOTE — ASSESSMENT & PLAN NOTE
Glucose controlled with use of SSI Novolog only with minimal requirements.    Continue same regimen.     Plan  - Continue Novolog SSI 4 times daily  - Continue Dexcom G6    F/u 6 months

## 2023-03-27 NOTE — ASSESSMENT & PLAN NOTE
TSH 6.6 which is at goal for his age and history of atrial fibrillation  Use of levothyroxine should be avoided at this time  Amiodarone discontinued Nov 2022 by Cardiology  Will monitor TSH periodically  Keep TSH ~ 3-8

## 2023-04-03 ENCOUNTER — OFFICE VISIT (OUTPATIENT)
Dept: NEUROLOGY | Facility: CLINIC | Age: 78
End: 2023-04-03
Payer: MEDICARE

## 2023-04-03 VITALS
HEIGHT: 71 IN | RESPIRATION RATE: 14 BRPM | BODY MASS INDEX: 34.9 KG/M2 | DIASTOLIC BLOOD PRESSURE: 84 MMHG | SYSTOLIC BLOOD PRESSURE: 130 MMHG | HEART RATE: 68 BPM | WEIGHT: 249.31 LBS

## 2023-04-03 DIAGNOSIS — M54.12 CERVICAL RADICULOPATHY: ICD-10-CM

## 2023-04-03 DIAGNOSIS — G56.03 BILATERAL CARPAL TUNNEL SYNDROME: ICD-10-CM

## 2023-04-03 DIAGNOSIS — G62.9 POLYNEUROPATHY: ICD-10-CM

## 2023-04-03 DIAGNOSIS — I48.91 ATRIAL FIBRILLATION, UNSPECIFIED TYPE: ICD-10-CM

## 2023-04-03 DIAGNOSIS — R26.89 IMBALANCE: Primary | ICD-10-CM

## 2023-04-03 DIAGNOSIS — M54.16 LUMBAR RADICULOPATHY: ICD-10-CM

## 2023-04-03 PROCEDURE — 1160F PR REVIEW ALL MEDS BY PRESCRIBER/CLIN PHARMACIST DOCUMENTED: ICD-10-PCS | Mod: CPTII,S$GLB,, | Performed by: PSYCHIATRY & NEUROLOGY

## 2023-04-03 PROCEDURE — 99214 PR OFFICE/OUTPT VISIT, EST, LEVL IV, 30-39 MIN: ICD-10-PCS | Mod: S$GLB,,, | Performed by: PSYCHIATRY & NEUROLOGY

## 2023-04-03 PROCEDURE — 1159F PR MEDICATION LIST DOCUMENTED IN MEDICAL RECORD: ICD-10-PCS | Mod: CPTII,S$GLB,, | Performed by: PSYCHIATRY & NEUROLOGY

## 2023-04-03 PROCEDURE — 1126F AMNT PAIN NOTED NONE PRSNT: CPT | Mod: CPTII,S$GLB,, | Performed by: PSYCHIATRY & NEUROLOGY

## 2023-04-03 PROCEDURE — 3075F SYST BP GE 130 - 139MM HG: CPT | Mod: CPTII,S$GLB,, | Performed by: PSYCHIATRY & NEUROLOGY

## 2023-04-03 PROCEDURE — 3079F DIAST BP 80-89 MM HG: CPT | Mod: CPTII,S$GLB,, | Performed by: PSYCHIATRY & NEUROLOGY

## 2023-04-03 PROCEDURE — 99999 PR PBB SHADOW E&M-EST. PATIENT-LVL III: ICD-10-PCS | Mod: PBBFAC,,, | Performed by: PSYCHIATRY & NEUROLOGY

## 2023-04-03 PROCEDURE — 99999 PR PBB SHADOW E&M-EST. PATIENT-LVL III: CPT | Mod: PBBFAC,,, | Performed by: PSYCHIATRY & NEUROLOGY

## 2023-04-03 PROCEDURE — 1159F MED LIST DOCD IN RCRD: CPT | Mod: CPTII,S$GLB,, | Performed by: PSYCHIATRY & NEUROLOGY

## 2023-04-03 PROCEDURE — 1126F PR PAIN SEVERITY QUANTIFIED, NO PAIN PRESENT: ICD-10-PCS | Mod: CPTII,S$GLB,, | Performed by: PSYCHIATRY & NEUROLOGY

## 2023-04-03 PROCEDURE — 3079F PR MOST RECENT DIASTOLIC BLOOD PRESSURE 80-89 MM HG: ICD-10-PCS | Mod: CPTII,S$GLB,, | Performed by: PSYCHIATRY & NEUROLOGY

## 2023-04-03 PROCEDURE — 3075F PR MOST RECENT SYSTOLIC BLOOD PRESS GE 130-139MM HG: ICD-10-PCS | Mod: CPTII,S$GLB,, | Performed by: PSYCHIATRY & NEUROLOGY

## 2023-04-03 PROCEDURE — 99214 OFFICE O/P EST MOD 30 MIN: CPT | Mod: S$GLB,,, | Performed by: PSYCHIATRY & NEUROLOGY

## 2023-04-03 PROCEDURE — 1160F RVW MEDS BY RX/DR IN RCRD: CPT | Mod: CPTII,S$GLB,, | Performed by: PSYCHIATRY & NEUROLOGY

## 2023-04-03 NOTE — PROGRESS NOTES
HPI: Adan Bermudez is a 77 y.o. male with imbalance      Here for 6 months follow up    Balance is greatly improved with stopping amiodarone  and bystolic per cardiology (had some bradycardia)    PT never need to be completed    No more falls    Patient will now see pian management PRN    Spinal pain is severe at times and states he would consider injections with pain management for lower back pain    Numbness in the feet is tolerable and is not note much    Speech is still slurred infrequently and much improved    He tried ST without relief prior      Not light headed and no syncope and not vertigo    Pulse is normal       Memory is good    No tremor    No numbness in the hands    Restless at night or sitting in the legs looks more habitual    Weight is down 4 pounds        Patient has a long history speech disorder. Feels for years that he sometimes slurred his speech (noted prior to MRI brain)      Review of Systems   Constitutional:  Negative for fever.   HENT:  Negative for nosebleeds.    Eyes:  Negative for double vision.   Respiratory:  Positive for shortness of breath.         Chronic SOB and states stress test was normal and cardiology recommended weight loss   Cardiovascular:  Negative for leg swelling.   Gastrointestinal:  Negative for blood in stool.   Genitourinary:  Negative for hematuria.   Musculoskeletal:  Negative for falls.   Skin:  Negative for rash.   Neurological:  Negative for sensory change.       I have reviewed all of this patient's past medical and surgical histories as well as family and social histories and active allergies and medications as documented in the electronic medical record.        Exam:  Gen Appearance, well developed/nourished in no apparent distress  CV: 2+ distal pulses with no edema or swelling  Neuro:  MS: Awake, alert, oriented to place, person, time, situation. Sustains attention. Recent/remote memory intact, Language is full to spontaneous  speech/repetition/naming/comprehension. Fund of Knowledge is full  CN: Optic discs are flat with normal vasculature, PERRL, Extraoccular movements and visual fields are full. Normal facial sensation and strength, Hearing symmetric, Tongue and Palate are midline and strong. Shoulder Shrug symmetric and strong.  Motor: Normal bulk, tone, no abnormal movements. 5/5 strength bilateral upper/lower extremities with 1+ reflexes and no clonus  Sensory: symmetric to light touch, pain, temp, and vibration but reduced in feet to all modalities, Romberg positive  Cerebellar: Finger-nose,Heal-shin, Rapid alternating movements intact  Gait: Normal stance, with mild sensory ataxia    Imagin EMG/NCS of the arms: C5 and C6 more than C7 radicular disease and right more than left CTS   EMG/NCS of the legs: sensory and motor polyneuropathy and lumbar radicular changes      MRI L spine 2022: old mild compression fracture in the lower T spine, mild bulging disc    2022 MRI C spine: No severe spinal stenosis    2022 MRI brain: Age-appropriate generalized cerebral volume loss with moderate chronic microvascular ischemic disease.  No evidence for an acute infarction.         Labs:  CMP, CBC, unremarkable   TSH, SPEP, PEE, RPR normal    Assessment/Plan: Adan Bermudez is a 77 y.o. male with imbalance for years. Found to have polyneuropathy by EMG/NCS in  with Dr Stevens. He continues with poor balance and frequent falls.   I recommend:     1. Per outside/ [facility] records  - MRI brain showed mild atrophy and white matter changes  - EMG/NCS of the legs: sensory and motor polyneuropathy and lumbar radicular change. Has some back pain chronically which is not radicular and this has been present for years.Tolerates this well.   MRI L spine per pain management showed mild spinal stenosis and  MRI C spine: no severe stenosis  - EMG/NCS of the arms: C5 and C6 more than C7 radicular disease and right  more than left CTS  -Per review of 2020 records from outside neurology ([facility]) Compression fractures (chronic) noted by MRI T spine   -Tolerates all spinal pain well/ seeing pain management PRN. No symptoms from CTS at this time.   -Patient has a long history speech disorder. Feels for years that he sometimes mildly slurs his speech (noted prior to MRI brain). And this is slowly worsening and did not respond to ST prior  -Updated MRI Brain 9/2022 unremarkable/ no cause of his speech disorder found.  ?neurodegenerative disorder. Not a clear tremor in the right leg, but maybe some restless movement? LE parkinsonism. Monitor over time otherwise  Encouraging is his improvement in speech and falls on less BP meds per cardiology     2. Continue supplementation for low normal B12 levels.  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 Goal A1C less than 7  -urged him to use a walker at all times.   -Use PT RPN      -He was seeing pain management, Dr. Doyle for his back pain and his neck pain   -Refer back to pain management for lumbar pain   -His gait imbalance may be multifactorial and a separate issue from his vertiginous type complaints, as he has chronic lumbar pain, intrinsic issues of the right knee, and polyneuropathy, all of which, can contribute to gait imbalance.     3. Dizziness saw ENT prior and currently  -2019 [facility] suggested a central process  -recommended to have PT PRN (ENT suggested the same)    4. Bradycardia/ light headedness noted prior/ now Resolved on less meds (amiodarone  and bystolic). Also has COLVIN followed by cardiology  -on NOAC for afib/ CA prevention  -Urged him to continue reduce obesity via reduction of fried foods, rice and bread prior.     RTC 1 year

## 2023-04-04 ENCOUNTER — TELEPHONE (OUTPATIENT)
Dept: PAIN MEDICINE | Facility: CLINIC | Age: 78
End: 2023-04-04

## 2023-04-04 ENCOUNTER — OFFICE VISIT (OUTPATIENT)
Dept: PAIN MEDICINE | Facility: CLINIC | Age: 78
End: 2023-04-04
Payer: MEDICARE

## 2023-04-04 VITALS
DIASTOLIC BLOOD PRESSURE: 92 MMHG | HEIGHT: 71 IN | WEIGHT: 249.31 LBS | BODY MASS INDEX: 34.9 KG/M2 | SYSTOLIC BLOOD PRESSURE: 152 MMHG | RESPIRATION RATE: 18 BRPM

## 2023-04-04 DIAGNOSIS — M47.817 LUMBOSACRAL SPONDYLOSIS WITHOUT MYELOPATHY: Primary | ICD-10-CM

## 2023-04-04 PROCEDURE — 99214 PR OFFICE/OUTPT VISIT, EST, LEVL IV, 30-39 MIN: ICD-10-PCS | Mod: S$GLB,,, | Performed by: NURSE PRACTITIONER

## 2023-04-04 PROCEDURE — 99999 PR PBB SHADOW E&M-EST. PATIENT-LVL III: ICD-10-PCS | Mod: PBBFAC,,, | Performed by: NURSE PRACTITIONER

## 2023-04-04 PROCEDURE — 3288F PR FALLS RISK ASSESSMENT DOCUMENTED: ICD-10-PCS | Mod: CPTII,S$GLB,, | Performed by: NURSE PRACTITIONER

## 2023-04-04 PROCEDURE — 3080F PR MOST RECENT DIASTOLIC BLOOD PRESSURE >= 90 MM HG: ICD-10-PCS | Mod: CPTII,S$GLB,, | Performed by: NURSE PRACTITIONER

## 2023-04-04 PROCEDURE — 3077F PR MOST RECENT SYSTOLIC BLOOD PRESSURE >= 140 MM HG: ICD-10-PCS | Mod: CPTII,S$GLB,, | Performed by: NURSE PRACTITIONER

## 2023-04-04 PROCEDURE — 1126F AMNT PAIN NOTED NONE PRSNT: CPT | Mod: CPTII,S$GLB,, | Performed by: NURSE PRACTITIONER

## 2023-04-04 PROCEDURE — 3288F FALL RISK ASSESSMENT DOCD: CPT | Mod: CPTII,S$GLB,, | Performed by: NURSE PRACTITIONER

## 2023-04-04 PROCEDURE — 1126F PR PAIN SEVERITY QUANTIFIED, NO PAIN PRESENT: ICD-10-PCS | Mod: CPTII,S$GLB,, | Performed by: NURSE PRACTITIONER

## 2023-04-04 PROCEDURE — 3080F DIAST BP >= 90 MM HG: CPT | Mod: CPTII,S$GLB,, | Performed by: NURSE PRACTITIONER

## 2023-04-04 PROCEDURE — 99999 PR PBB SHADOW E&M-EST. PATIENT-LVL III: CPT | Mod: PBBFAC,,, | Performed by: NURSE PRACTITIONER

## 2023-04-04 PROCEDURE — 1159F PR MEDICATION LIST DOCUMENTED IN MEDICAL RECORD: ICD-10-PCS | Mod: CPTII,S$GLB,, | Performed by: NURSE PRACTITIONER

## 2023-04-04 PROCEDURE — 1159F MED LIST DOCD IN RCRD: CPT | Mod: CPTII,S$GLB,, | Performed by: NURSE PRACTITIONER

## 2023-04-04 PROCEDURE — 99214 OFFICE O/P EST MOD 30 MIN: CPT | Mod: S$GLB,,, | Performed by: NURSE PRACTITIONER

## 2023-04-04 PROCEDURE — 1100F PR PT FALLS ASSESS DOC 2+ FALLS/FALL W/INJURY/YR: ICD-10-PCS | Mod: CPTII,S$GLB,, | Performed by: NURSE PRACTITIONER

## 2023-04-04 PROCEDURE — 1100F PTFALLS ASSESS-DOCD GE2>/YR: CPT | Mod: CPTII,S$GLB,, | Performed by: NURSE PRACTITIONER

## 2023-04-04 PROCEDURE — 3077F SYST BP >= 140 MM HG: CPT | Mod: CPTII,S$GLB,, | Performed by: NURSE PRACTITIONER

## 2023-04-04 NOTE — PROGRESS NOTES
[facility]      Chief Complaint:   Chief Complaint   Patient presents with    Back Pain       History of Present Illness: Adan Bermudez is a 77 y.o. male referred by Dr. Gonzalez Guevara for LBP.  He actually notes more neck pain than back pain today and would like to talk about that.     Low Back Pain:  Onset: 2 years ago around the time he had a knee replacement  Location: bilateral low back  Radiation: no radiation down legs, but legs feel heavy and weak with standing  Timing: intermittent, pain occurs when he wakes in the morning and when he stands too long  Quality: Aching  Exacerbating Factors: standing and walking, first thing in the morning  Alleviating Factors: rest  Associated Symptoms: He feels weakness in both legs, unstable on his feet. He denies night fever/night sweats, urinary incontinence, bowel incontinence, significant weight loss, and loss of sensations      Neck pain has been present 6-7 years. Denies traumatic onset. Pain is localized to the bilateral upper cervical paraspinal regions with no radiation down the arms. Pain is described as unbearable. The pain is intermittent and aggravated by certain head positions, turning head. The pain is alleviated by tylenol. He denies weakness or numbness in the arms. Denies changes in bowel or bladder function. He has noticed changes in hand writing. He denies difficulty with buttons He has had changes in gait. Denies recent fevers or infections. Denies unexplained weight loss.    He has been in PT for nearly 2 years for vertigo, impaired gait.    Severity: Currently: 3 /10   Typical Range: 3 -5/10     Exacerbation: 5/10     Interval History (10/11/2022):  Adan Bermudez returns today for follow up.  At the last clinic visit, ordered imaging, scheduled MBBs,     B/L C3-5 MBBs provided temporary relief, but he is not able to quantify this.    Currently, the neck and back pain is stable.  He denies any significant changes in the neck  or the back pain.  States the back pain is currently worse than the neck pain.    He states his primary issue is still nausea, shortness of breath with exertion, dizziness, trouble walking, and trouble with balance.  His pain is not his primary issue and he would rather focus on those other problems.    Current Pain Scales:  Current: 4/10                 Interval HIstory (4/4/2023):  Mr Bermudez presents for follow up of chronic cervicalgia and lumbar pain. Cervicalgia is tolerable. Lower back pain without radicular pain continued. +a.m. stiffness, no pain with valsalva. Standing exacerbates pain and leaning forward on walker improves pain but denies neurogenic claudication to legs. No focal voicing of cauda equina syndrome.        Previous Interventions:  - 9/16/22: B/L C3-5 MBBs w/ relief, but not able to quantify.     Previous Therapies:  PT/OT: yes   Relevant Surgery: no   Previous Medications:   - NSAIDS: Tylenol.   - Muscle Relaxants:    - TCAs:   - SNRIs:   - Topicals:   - Anticonvulsants:    - Opioids:     Current Pain Medications:  Tylenol     Blood Thinners: Eliquis    Full Medication List:    Current Outpatient Medications:     blood-glucose meter,continuous (DEXCOM G6 ) Misc, 1 each by Misc.(Non-Drug; Combo Route) route every 3 (three) months., Disp: 1 each, Rfl: 0    blood-glucose transmitter (DEXCOM G6 TRANSMITTER) Julia, 1 each by Misc.(Non-Drug; Combo Route) route once. for 1 dose, Disp: 1 each, Rfl: 3    doxazosin (CARDURA) 2 MG tablet, Take 2 mg by mouth once daily., Disp: , Rfl:     ELIQUIS 5 mg Tab, Take 1 tablet (5 mg total) by mouth 2 (two) times daily., Disp: 180 tablet, Rfl: 0    EPINEPHrine (EPIPEN) 0.3 mg/0.3 mL AtIn, INJECT AS DIRECTED, Disp: , Rfl:     meclizine (ANTIVERT) 25 mg tablet, Take 25 mg by mouth 3 (three) times daily as needed., Disp: , Rfl:     ramipriL (ALTACE) 10 MG capsule, Take 10 mg by mouth once daily., Disp: , Rfl:     rosuvastatin (CRESTOR) 20 MG tablet, Take  "20 mg by mouth nightly., Disp: , Rfl:     torsemide (DEMADEX) 20 MG Tab, Take 20 mg by mouth once daily., Disp: , Rfl:      Review of Systems:  ROS    Allergies:  Bee sting [allergen ext-venom-honey bee]     Medical History:   has a past medical history of Diabetes mellitus, HLD (hyperlipidemia), and Venous insufficiency of both lower extremities.    Surgical History:   has a past surgical history that includes Total knee replacement using computer navigation (Bilateral, 07/2019) and Injection of anesthetic agent around medial branch nerves innervating cervical facet joint (Bilateral, 9/16/2022).    Family History:  family history includes Heart disease in his father and mother.    Social History:   reports that he has never smoked. He has never used smokeless tobacco. He reports that he does not currently use alcohol.    Physical Exam:  BP (!) 152/92   Resp 18   Ht 5' 11" (1.803 m)   Wt 113.1 kg (249 lb 5.4 oz)   BMI 34.78 kg/m²   GEN:  Well developed, well nourished.  No acute distress.   HEENT:  No trauma.  Mucous membranes moist.  Nares patent bilaterally.  PSYCH: Normal affect. Thought content appropriate.  CHEST:  Breathing symmetric.  No audible wheezing.  ABD: Soft, non-distended.  SKIN:  Warm, pink, dry.  No rash on exposed areas.    EXT:  No cyanosis, clubbing, or edema.  No color change or changes in nail or hair growth.  NEURO/MUSCULOSKELETAL:  Fully alert, oriented, and appropriate. Speech normal nimco. No cranial nerve deficits.   Gait: Using Walker.  No focal motor deficits.   Lumbar: +Facet loading reproducing symptoms/pain. (-) SLR  Neuro: no loss of sensation distally     Imaging:  - MRI Lumbar spine w/ w/o contrast 9/27/22:  Imaged distal cord is normal in size and signal.  Conus terminates at the T12-L1 level.  Vertebral bodies are normal in height and alignment.  No compression deformities.  No marrow edema.  Superior endplate Schmorl's node at the L3 level measuring approximately 1.2 cm " in diameter.  Degenerative related endplate signal changes at all levels to some extent with moderate diffuse marginal osteophytes present.     T11-T12 and T12-L1 discs demonstrate calcification.     L1-L2: Slight loss of disc space height with disc desiccation, but no significant herniation.  No spinal canal or foraminal narrowing.     L2-L3: Loss of disc space height with disc desiccation and minimal broad-based disc osteophyte resulting in trace indentation on the anterior thecal sac, but overall maintained thecal sac AP diameter which measures 1.1 cm.  Minimal bilateral foraminal narrowing.     L3-L4: Loss of disc space height with disc desiccation and mild broad-based bulge along with bilateral facet arthropathy.  Also component of hypertrophy of the posterior epidural fat.  Mild circumferential narrowing of the spinal canal at this level AP diameter of the thecal sac measuring 9.5 mm.  Mild right foraminal narrowing and moderate left foraminal narrowing with close approximation to the exiting left-sided nerve root.     L4-L5: Mild loss of disc space height with disc desiccation and mild broad-based disc bulge along with bilateral facet arthropathy and thickening of the posterior epidural fat.  Mild narrowing of the spinal canal with thecal sac AP diameter measuring 8.5 mm.  Minimal left and mild right foraminal narrowing.     L5-S1: Loss of disc space height with disc desiccation and minimal broad-based disc bulge resulting in trace indentation on the anterior thecal sac, but no significant spinal canal narrowing.  Bilateral facet arthropathy noted.  Mild left foraminal narrowing.  Minimal right foraminal narrowing.     Previously described complex collection within the left gluteus musculature is again noted.  This finding extends to the subcutaneous tissues of the left buttocks measuring up to 8.1 x 4.9 cm in axial dimensions.  There is mixed internal signal on T1 and T2 sequences with no change in signal  characteristics before and after intravenous administration of contrast.  This indicates no significant enhancement and suggests the presence of a hematoma.    - MRI Cervical spine 9/9/22:  The craniocervical junction is intact.  There is no evidence for a Chiari malformation.  The spinal is normal in signal cord edema or myelomalacia.  The incidentally visualized soft tissue structures of the neck have a normal appearance.  There is an exaggerated cervical lordosis.  Vertebral body heights are maintained.  There is disc desiccation with disc space narrowing throughout the cervical spine.  Marrow signal is normal without evidence for a marrow replacement process, infection or tumor.  At C2-3, no disc herniation, central canal stenosis or neural foraminal narrowing.  At C3-4, posterior disc osteophyte complex with uncovertebral spurring and facet arthropathy contributing to mild central canal stenosis with moderate left and moderate severe right neural foraminal narrowing.  At C4-5, posterior disc osteophyte complex with uncovertebral spurring and facet arthropathy contributing to mild moderate central canal stenosis with mild moderate bilateral neural foraminal narrowing.  C5-6, posterior disc osteophyte complex with uncovertebral spurring and facet arthropathy contributing to mild central canal stenosis with moderate severe left and severe right neural foraminal narrowing.  At C6-7, posterior disc osteophyte complex with uncovertebral spurring and facet arthropathy contributing to mild left and mild moderate right neural foraminal narrowing.  At C7-T1, no disc herniation, central canal stenosis or neural foraminal narrowing.    - MRI Lumbar spine 6/20/22:      Labs:  BMP  Lab Results   Component Value Date     03/23/2023    K 3.7 03/23/2023     03/23/2023    CO2 27 03/23/2023    BUN 29 (H) 03/23/2023    CREATININE 1.5 (H) 03/23/2023    CALCIUM 9.0 03/23/2023    ANIONGAP 9 03/23/2023     Lab Results    Component Value Date    ALT 42 09/16/2022    AST 31 09/16/2022    ALKPHOS 69 09/16/2022    BILITOT 0.6 09/16/2022     No results found for: PLT    Assessment:  Adan Bermudez is a 77 y.o. male with the following diagnoses based on history, exam, and imaging:    Problem List Items Addressed This Visit    None  Visit Diagnoses       Lumbosacral spondylosis without myelopathy    -  Primary                This is a pleasant 77 y.o. gentleman presenting with:     - Chronic neck: Appears to be facetogenic in the upper cervical spine  - Chronic bilateral low back pain: Pain appears primarily facetogenic. Moderate foraminal narrowing at L5-S1 on prior MRI, but no significant canal narrowing to clearly explain his leg symptoms though does seem like LSS.   - Soft tissue mass in left paraspinal region above iliac crest  - Comorbidities: Paroxysmal A-fib on eliquis. HTN. DM2. CKD.     Treatment Plan:   - Prior records reviewed  - Prior imaging reviewed  - Continue HEP  - s/f B L3,4,5 MBB  - If diagnostic proceed with repeat MBB and RFA thereafter if indicated.   - RTC after procedures.   Follow Up: RTC PRN     CRISTOBAL Altamirano  Interventional Pain Medicine / Physical Medicine & Rehabilitation  04/04/2023    Disclaimer: This note was partly generated using dictation software which may occasionally result in transcription errors.     I spent a total of 30 minutes on the day of the visit.  This includes face to face time and non-face to face time preparing to see the patient by reviewing previous labs/imaging, obtaining and/or reviewing separately obtained history, documenting clinical information in the electronic or other health record, independently interpreting results and communicating results to the patient/family/caregiver.

## 2023-04-04 NOTE — TELEPHONE ENCOUNTER
Bilateral L3,4,5 MBB scheduled 04/21/2023. Advised that pre admit would contact patient with time of procedure. Advised patient to contact office if he starts any type of antibiotics or new blood thinners. Voiced understanding.

## 2023-04-18 RX ORDER — SODIUM CHLORIDE 9 MG/ML
500 INJECTION, SOLUTION INTRAVENOUS CONTINUOUS
Status: CANCELLED | OUTPATIENT
Start: 2023-04-18

## 2023-04-20 RX ORDER — SODIUM CHLORIDE 9 MG/ML
500 INJECTION, SOLUTION INTRAVENOUS CONTINUOUS
Status: CANCELLED | OUTPATIENT
Start: 2023-04-20

## 2023-04-21 ENCOUNTER — HOSPITAL ENCOUNTER (OUTPATIENT)
Dept: RADIOLOGY | Facility: HOSPITAL | Age: 78
Discharge: HOME | End: 2023-04-21
Attending: STUDENT IN AN ORGANIZED HEALTH CARE EDUCATION/TRAINING PROGRAM | Admitting: STUDENT IN AN ORGANIZED HEALTH CARE EDUCATION/TRAINING PROGRAM
Payer: MEDICARE

## 2023-04-21 ENCOUNTER — HOSPITAL ENCOUNTER (OUTPATIENT)
Facility: HOSPITAL | Age: 78
Discharge: HOME | End: 2023-04-21
Attending: STUDENT IN AN ORGANIZED HEALTH CARE EDUCATION/TRAINING PROGRAM | Admitting: STUDENT IN AN ORGANIZED HEALTH CARE EDUCATION/TRAINING PROGRAM
Payer: MEDICARE

## 2023-04-21 VITALS
OXYGEN SATURATION: 94 % | HEART RATE: 61 BPM | TEMPERATURE: 99 F | DIASTOLIC BLOOD PRESSURE: 92 MMHG | RESPIRATION RATE: 16 BRPM | SYSTOLIC BLOOD PRESSURE: 184 MMHG

## 2023-04-21 DIAGNOSIS — M47.817 LUMBOSACRAL SPONDYLOSIS WITHOUT MYELOPATHY: ICD-10-CM

## 2023-04-21 DIAGNOSIS — M47.816 LUMBAR SPONDYLOSIS: Primary | ICD-10-CM

## 2023-04-21 DIAGNOSIS — M47.812 CERVICAL SPONDYLOSIS: ICD-10-CM

## 2023-04-21 PROCEDURE — 64494 PR INJ DX/THER AGNT PARAVERT FACET JOINT,IMG GUIDE,LUMBAR/SAC, 2ND LEVEL: ICD-10-PCS | Mod: 50,,, | Performed by: STUDENT IN AN ORGANIZED HEALTH CARE EDUCATION/TRAINING PROGRAM

## 2023-04-21 PROCEDURE — 64493 PR INJ DX/THER AGNT PARAVERT FACET JOINT,IMG GUIDE,LUMBAR/SAC,1ST LVL: ICD-10-PCS | Mod: 50,,, | Performed by: STUDENT IN AN ORGANIZED HEALTH CARE EDUCATION/TRAINING PROGRAM

## 2023-04-21 PROCEDURE — 64493 INJ PARAVERT F JNT L/S 1 LEV: CPT | Mod: 50 | Performed by: STUDENT IN AN ORGANIZED HEALTH CARE EDUCATION/TRAINING PROGRAM

## 2023-04-21 PROCEDURE — 64494 INJ PARAVERT F JNT L/S 2 LEV: CPT | Mod: 50 | Performed by: STUDENT IN AN ORGANIZED HEALTH CARE EDUCATION/TRAINING PROGRAM

## 2023-04-21 PROCEDURE — 64494 INJ PARAVERT F JNT L/S 2 LEV: CPT | Mod: 50,,, | Performed by: STUDENT IN AN ORGANIZED HEALTH CARE EDUCATION/TRAINING PROGRAM

## 2023-04-21 PROCEDURE — 25000003 PHARM REV CODE 250: Performed by: STUDENT IN AN ORGANIZED HEALTH CARE EDUCATION/TRAINING PROGRAM

## 2023-04-21 PROCEDURE — 64493 INJ PARAVERT F JNT L/S 1 LEV: CPT | Mod: 50,,, | Performed by: STUDENT IN AN ORGANIZED HEALTH CARE EDUCATION/TRAINING PROGRAM

## 2023-04-21 RX ORDER — LIDOCAINE HYDROCHLORIDE 10 MG/ML
INJECTION INFILTRATION; PERINEURAL
Status: DISCONTINUED | OUTPATIENT
Start: 2023-04-21 | End: 2023-04-21 | Stop reason: HOSPADM

## 2023-04-21 RX ORDER — LIDOCAINE HYDROCHLORIDE 20 MG/ML
INJECTION, SOLUTION EPIDURAL; INFILTRATION; INTRACAUDAL; PERINEURAL
Status: DISCONTINUED | OUTPATIENT
Start: 2023-04-21 | End: 2023-04-21 | Stop reason: HOSPADM

## 2023-04-21 NOTE — OP NOTE
Diagnostic Lumbar Medial Branch Block Under Fluoroscopy    The procedure, risks, benefits, and options were discussed with the patient. There are no contraindications to the procedure. The patent expressed understanding and agreed to the procedure. Informed written consent was obtained prior to the start of the procedure and can be found in the patient's chart.    PATIENT NAME: Adan Bermudez   MRN: [id number]     DATE OF PROCEDURE: 04/21/2023                                           PROCEDURE:  Diagnostic Bilateral L3, L4, and L5 Lumbar Medial Branch Block under Fluoroscopy    PRE-OP DIAGNOSIS: Lumbosacral spondylosis without myelopathy [M47.817] Lumbar spondylosis [M47.816]    POST-OP DIAGNOSIS: Same    PHYSICIAN: Zoie Garay DO    ASSISTANTS: None    MEDICATIONS INJECTED:  Xylocaine 2%    LOCAL ANESTHETIC INJECTED:   Lidocaine 1%    SEDATION: None    ESTIMATED BLOOD LOSS:  None    COMPLICATIONS:  None.    INTERVAL HISTORY: Patient has clinical and imaging findings suggestive of facet mediated pain.    TECHNIQUE: Time-out was performed to identify the patient and procedure to be performed. With the patient laying in a prone position, the surgical area was prepped and draped in the usual sterile fashion using ChloraPrep and fenestrated drape. The levels were determined under fluoroscopic guidance. Skin anesthesia was achieved by injecting Lidocaine 2% over the injection sites. A 22 gauge, 3.5 inch needle was introduced into the medial branch nerves at the junctions of the superior articular process and the transverse processes of the targeted sites using AP, lateral and/or contralateral oblique fluoroscopic imaging. After negative aspiration for blood or CSF was confirmed, 1 mL of the anesthetic listed above was then slowly injected at each site. The needles were removed and bleeding was nil. A sterile dressing was applied. No specimens collected. The patient tolerated the procedure well.     The patient  was monitored after the procedure in the recovery area. They were given post-procedure and discharge instructions to follow at home. The patient was discharged in a stable condition.        Zoie Garay DO

## 2023-04-21 NOTE — DISCHARGE SUMMARY
\Discharge Note  Short Stay      SUMMARY     Admit Date: 4/21/2023    Attending Physician: Zoie Garay      Discharge Physician: Zoie Garay      Discharge Date: 4/21/2023 7:47 AM    Procedure(s) (LRB):  LUMBAR MEDIAL BRANCH NERVE BLOCK (L3,4,5) (Bilateral)    Final Diagnosis: Lumbosacral spondylosis without myelopathy [M47.817]    Disposition: Home or self care    Patient Instructions:   Current Discharge Medication List        CONTINUE these medications which have NOT CHANGED    Details   doxazosin (CARDURA) 2 MG tablet Take 2 mg by mouth once daily.      ELIQUIS 5 mg Tab Take 1 tablet (5 mg total) by mouth 2 (two) times daily.  Qty: 180 tablet, Refills: 0      ramipriL (ALTACE) 10 MG capsule Take 10 mg by mouth once daily.      rosuvastatin (CRESTOR) 20 MG tablet Take 20 mg by mouth nightly.      torsemide (DEMADEX) 20 MG Tab Take 20 mg by mouth once daily.      blood-glucose meter,continuous (DEXCOM G6 ) Misc 1 each by Misc.(Non-Drug; Combo Route) route every 3 (three) months.  Qty: 1 each, Refills: 0    Associated Diagnoses: Type 2 diabetes mellitus with diabetic polyneuropathy, without long-term current use of insulin      blood-glucose transmitter (DEXCOM G6 TRANSMITTER) Julia 1 each by Misc.(Non-Drug; Combo Route) route once. for 1 dose  Qty: 1 each, Refills: 3    Associated Diagnoses: Type 2 diabetes mellitus with diabetic polyneuropathy, without long-term current use of insulin      EPINEPHrine (EPIPEN) 0.3 mg/0.3 mL AtIn INJECT AS DIRECTED      meclizine (ANTIVERT) 25 mg tablet Take 25 mg by mouth 3 (three) times daily as needed.                 Discharge Diagnosis: Lumbosacral spondylosis without myelopathy [M47.817]  Condition on Discharge: Stable with no complications to procedure   Diet on Discharge: Same as before.  Activity: as per instruction sheet.  Discharge to: Home with a responsible adult.  Follow up: 2-4 weeks       Please call my office or pager at [phone] if  experienced any weakness or loss of sensation, fever > 101.5, pain uncontrolled with oral medications, persistent nausea/vomiting/or diarrhea, redness or drainage from the incisions, or any other worrisome concerns. If physician on call was not reached or could not communicate with our office for any reason please go to the nearest emergency department

## 2023-04-25 ENCOUNTER — TELEPHONE (OUTPATIENT)
Dept: PAIN MEDICINE | Facility: CLINIC | Age: 78
End: 2023-04-25
Payer: MEDICARE

## 2023-04-25 DIAGNOSIS — M47.817 LUMBOSACRAL SPONDYLOSIS WITHOUT MYELOPATHY: Primary | ICD-10-CM

## 2023-04-25 NOTE — TELEPHONE ENCOUNTER
----- Message from Amelia Nur sent at 2023  9:42 AM CDT -----  Contact: michael/spouse  Adan Bermudez  MRN: [id number]  : 1945  PCP: Angus Ervin  Home Phone      [phone]  Work Phone      Not on file.  Mobile          [phone]      MESSAGE: calling stating she was told to call the office on  at his last visit        Phone [phone]

## 2023-05-04 NOTE — TELEPHONE ENCOUNTER
Medial Branch Follow Up Phone Call    Procedure: Bilateral L3,4,5 medial branch diagnostic blocks  Procedure Date: 04/21/2023    The patient was contacted via telephone after the diagnostic block to assess post-operative pain scores and function.  The following was reported.    The patient reports greater than 80% improvement in pain in the first few hours after the procedure.  The patient reports greater than 50% improvement in function in the first few hours after the procedure.    Overall % relief: 80%    Current Pain: 8/10.

## 2023-05-04 NOTE — TELEPHONE ENCOUNTER
Bilateral L3,4,5 medial branch diagnostic blocks scheduled 05/19/2023. Advised that pre admit would contact patient with time of procedure. Advised patient to contact office if he starts any type of antibiotics or new blood thinners. Voiced understanding.

## 2023-05-14 RX ORDER — APIXABAN 5 MG/1
5 TABLET, FILM COATED ORAL 2 TIMES DAILY
Qty: 180 TABLET | Refills: 0 | Status: ON HOLD | OUTPATIENT
Start: 2023-05-14 | End: 2023-08-14 | Stop reason: SDUPTHER

## 2023-05-19 ENCOUNTER — HOSPITAL ENCOUNTER (OUTPATIENT)
Facility: HOSPITAL | Age: 78
Discharge: HOME | End: 2023-05-19
Attending: STUDENT IN AN ORGANIZED HEALTH CARE EDUCATION/TRAINING PROGRAM | Admitting: STUDENT IN AN ORGANIZED HEALTH CARE EDUCATION/TRAINING PROGRAM
Payer: MEDICARE

## 2023-05-19 ENCOUNTER — HOSPITAL ENCOUNTER (OUTPATIENT)
Dept: RADIOLOGY | Facility: HOSPITAL | Age: 78
Discharge: HOME | End: 2023-05-19
Attending: STUDENT IN AN ORGANIZED HEALTH CARE EDUCATION/TRAINING PROGRAM | Admitting: STUDENT IN AN ORGANIZED HEALTH CARE EDUCATION/TRAINING PROGRAM
Payer: MEDICARE

## 2023-05-19 VITALS
RESPIRATION RATE: 16 BRPM | OXYGEN SATURATION: 95 % | TEMPERATURE: 98 F | SYSTOLIC BLOOD PRESSURE: 194 MMHG | HEART RATE: 62 BPM | DIASTOLIC BLOOD PRESSURE: 91 MMHG

## 2023-05-19 DIAGNOSIS — R52 PAIN: ICD-10-CM

## 2023-05-19 DIAGNOSIS — M47.817 LUMBOSACRAL SPONDYLOSIS WITHOUT MYELOPATHY: ICD-10-CM

## 2023-05-19 DIAGNOSIS — M47.816 LUMBAR SPONDYLOSIS: Primary | ICD-10-CM

## 2023-05-19 PROCEDURE — 64493 INJ PARAVERT F JNT L/S 1 LEV: CPT | Mod: 50 | Performed by: STUDENT IN AN ORGANIZED HEALTH CARE EDUCATION/TRAINING PROGRAM

## 2023-05-19 PROCEDURE — 25000003 PHARM REV CODE 250: Performed by: STUDENT IN AN ORGANIZED HEALTH CARE EDUCATION/TRAINING PROGRAM

## 2023-05-19 PROCEDURE — 64494 PR INJ DX/THER AGNT PARAVERT FACET JOINT,IMG GUIDE,LUMBAR/SAC, 2ND LEVEL: ICD-10-PCS | Mod: 50,,, | Performed by: STUDENT IN AN ORGANIZED HEALTH CARE EDUCATION/TRAINING PROGRAM

## 2023-05-19 PROCEDURE — 64494 INJ PARAVERT F JNT L/S 2 LEV: CPT | Mod: 50,,, | Performed by: STUDENT IN AN ORGANIZED HEALTH CARE EDUCATION/TRAINING PROGRAM

## 2023-05-19 PROCEDURE — 64494 INJ PARAVERT F JNT L/S 2 LEV: CPT | Mod: 50 | Performed by: STUDENT IN AN ORGANIZED HEALTH CARE EDUCATION/TRAINING PROGRAM

## 2023-05-19 PROCEDURE — 64493 INJ PARAVERT F JNT L/S 1 LEV: CPT | Mod: 50,,, | Performed by: STUDENT IN AN ORGANIZED HEALTH CARE EDUCATION/TRAINING PROGRAM

## 2023-05-19 PROCEDURE — 64493 PR INJ DX/THER AGNT PARAVERT FACET JOINT,IMG GUIDE,LUMBAR/SAC,1ST LVL: ICD-10-PCS | Mod: 50,,, | Performed by: STUDENT IN AN ORGANIZED HEALTH CARE EDUCATION/TRAINING PROGRAM

## 2023-05-19 RX ORDER — LIDOCAINE HYDROCHLORIDE 20 MG/ML
INJECTION, SOLUTION EPIDURAL; INFILTRATION; INTRACAUDAL; PERINEURAL
Status: DISCONTINUED | OUTPATIENT
Start: 2023-05-19 | End: 2023-05-19 | Stop reason: HOSPADM

## 2023-05-19 RX ORDER — LIDOCAINE HYDROCHLORIDE 10 MG/ML
INJECTION INFILTRATION; PERINEURAL
Status: DISCONTINUED | OUTPATIENT
Start: 2023-05-19 | End: 2023-05-19 | Stop reason: HOSPADM

## 2023-05-19 RX ORDER — SODIUM CHLORIDE 9 MG/ML
500 INJECTION, SOLUTION INTRAVENOUS CONTINUOUS
Status: DISCONTINUED | OUTPATIENT
Start: 2023-05-19 | End: 2023-05-19 | Stop reason: HOSPADM

## 2023-05-19 NOTE — H&P
HPI  Patient presenting for Procedure(s) (LRB):  LUMBAR MEDIAL BRANCH NERVE BLOCK (L3,4,5) (Bilateral)     Patient on Anti-coagulation Yes eliquis    No health changes since previous encounter    Past Medical History:   Diagnosis Date    Diabetes mellitus     HLD (hyperlipidemia)     Venous insufficiency of both lower extremities      Past Surgical History:   Procedure Laterality Date    INJECTION OF ANESTHETIC AGENT AROUND MEDIAL BRANCH NERVES INNERVATING CERVICAL FACET JOINT Bilateral 9/16/2022    Procedure: CERVICAL MEDIAL BRANCH NERVE BLOCK (C3-4,C4-5);  Surgeon: Amber Doyle MD;  Location: [facility];  Service: Pain Management;  Laterality: Bilateral;    INJECTION OF ANESTHETIC AGENT AROUND MEDIAL BRANCH NERVES INNERVATING LUMBAR FACET JOINT Bilateral 4/21/2023    Procedure: LUMBAR MEDIAL BRANCH NERVE BLOCK (L3,4,5);  Surgeon: Zoie Garay DO;  Location: [facility];  Service: Pain Management;  Laterality: Bilateral;    TOTAL KNEE REPLACEMENT USING COMPUTER NAVIGATION Bilateral 07/2019     Review of patient's allergies indicates:   Allergen Reactions    Bee sting [allergen ext-venom-honey bee] Shortness Of Breath      No current facility-administered medications for this encounter.     Current Outpatient Medications   Medication Sig    doxazosin (CARDURA) 2 MG tablet Take 2 mg by mouth once daily.    ELIQUIS 5 mg Tab Take 1 tablet (5 mg total) by mouth 2 (two) times daily.    ramipriL (ALTACE) 10 MG capsule Take 10 mg by mouth once daily.    rosuvastatin (CRESTOR) 20 MG tablet Take 20 mg by mouth nightly.    torsemide (DEMADEX) 20 MG Tab Take 20 mg by mouth once daily.    blood-glucose meter,continuous (DEXCOM G6 ) Misc 1 each by Misc.(Non-Drug; Combo Route) route every 3 (three) months.    blood-glucose transmitter (DEXCOM G6 TRANSMITTER) Julia 1 each by Misc.(Non-Drug; Combo Route) route once. for 1 dose    EPINEPHrine (EPIPEN) 0.3 mg/0.3 mL AtIn INJECT AS DIRECTED    meclizine (ANTIVERT) 25 mg  tablet Take 25 mg by mouth 3 (three) times daily as needed.       PMHx, PSHx, Allergies, Medications reviewed in epic    ROS negative except pain complaints in HPI    OBJECTIVE:    BP (!) 194/91 (BP Location: Right arm, Patient Position: Lying)   Pulse 62   Temp 97.8 °F (36.6 °C) (Skin)   Resp 16   SpO2 95%     PHYSICAL EXAMINATION:    GENERAL: Well appearing, in no acute distress, alert and oriented x3.  PSYCH:  Mood and affect appropriate.  SKIN: Skin color, texture, turgor normal, no rashes or lesions which will impact the procedure.  CV: RRR with palpation of the radial artery.  PULM: No evidence of respiratory difficulty, symmetric chest rise. Clear to auscultation.  NEURO: Cranial nerves grossly intact.    Plan:    Proceed with procedure as planned Procedure(s) (LRB):  LUMBAR MEDIAL BRANCH NERVE BLOCK (L3,4,5) (Bilateral)    Zoie Garay  05/19/2023

## 2023-05-19 NOTE — OP NOTE
Diagnostic Lumbar Medial Branch Block Under Fluoroscopy    The procedure, risks, benefits, and options were discussed with the patient. There are no contraindications to the procedure. The patent expressed understanding and agreed to the procedure. Informed written consent was obtained prior to the start of the procedure and can be found in the patient's chart.    PATIENT NAME: Adan Bermudez   MRN: [id number]     DATE OF PROCEDURE: 05/19/2023                                           PROCEDURE:  Diagnostic Bilateral L3, L4, and L5 Lumbar Medial Branch Block under Fluoroscopy    PRE-OP DIAGNOSIS: Lumbosacral spondylosis without myelopathy [M47.817] Lumbar spondylosis [M47.816]    POST-OP DIAGNOSIS: Same    PHYSICIAN: Zoie Garay DO    ASSISTANTS: None      MEDICATIONS INJECTED:  Bupivicaine 0.25%    LOCAL ANESTHETIC INJECTED:   Xylocaine 2%    SEDATION: None    ESTIMATED BLOOD LOSS:  None    COMPLICATIONS:  None.    INTERVAL HISTORY: Patient has clinical and imaging findings suggestive of facet mediated pain.    TECHNIQUE: Time-out was performed to identify the patient and procedure to be performed. With the patient laying in a prone position, the surgical area was prepped and draped in the usual sterile fashion using ChloraPrep and fenestrated drape. The levels were determined under fluoroscopic guidance. Skin anesthesia was achieved by injecting Lidocaine 2% over the injection sites. A 22 gauge, 3.5 inch needle was introduced into the medial branch nerves at the junctions of the superior articular process and the transverse processes of the targeted sites using AP, lateral and/or contralateral oblique fluoroscopic imaging. After negative aspiration for blood or CSF was confirmed, 1 mL of the anesthetic listed above was then slowly injected at each site. The needles were removed and bleeding was nil. A sterile dressing was applied. No specimens collected. The patient tolerated the procedure well.     The  patient was monitored after the procedure in the recovery area. They were given post-procedure and discharge instructions to follow at home. The patient was discharged in a stable condition.      Zoie Garay DO

## 2023-05-19 NOTE — DISCHARGE SUMMARY
Discharge Note  Short Stay      SUMMARY     Admit Date: 5/19/2023    Attending Physician: Zoie Garay      Discharge Physician: Zoie Garay      Discharge Date: 5/19/2023 8:06 AM    Procedure(s) (LRB):  LUMBAR MEDIAL BRANCH NERVE BLOCK (L3,4,5) (Bilateral)    Final Diagnosis: Lumbosacral spondylosis without myelopathy [M47.817]    Disposition: Home or self care    Patient Instructions:   Current Discharge Medication List        CONTINUE these medications which have NOT CHANGED    Details   doxazosin (CARDURA) 2 MG tablet Take 2 mg by mouth once daily.      ELIQUIS 5 mg Tab Take 1 tablet (5 mg total) by mouth 2 (two) times daily.  Qty: 180 tablet, Refills: 0      ramipriL (ALTACE) 10 MG capsule Take 10 mg by mouth once daily.      rosuvastatin (CRESTOR) 20 MG tablet Take 20 mg by mouth nightly.      torsemide (DEMADEX) 20 MG Tab Take 20 mg by mouth once daily.      blood-glucose meter,continuous (DEXCOM G6 ) Misc 1 each by Misc.(Non-Drug; Combo Route) route every 3 (three) months.  Qty: 1 each, Refills: 0    Associated Diagnoses: Type 2 diabetes mellitus with diabetic polyneuropathy, without long-term current use of insulin      blood-glucose transmitter (DEXCOM G6 TRANSMITTER) Julia 1 each by Misc.(Non-Drug; Combo Route) route once. for 1 dose  Qty: 1 each, Refills: 3    Associated Diagnoses: Type 2 diabetes mellitus with diabetic polyneuropathy, without long-term current use of insulin      EPINEPHrine (EPIPEN) 0.3 mg/0.3 mL AtIn INJECT AS DIRECTED      meclizine (ANTIVERT) 25 mg tablet Take 25 mg by mouth 3 (three) times daily as needed.                 Discharge Diagnosis: Lumbosacral spondylosis without myelopathy [M47.817]  Condition on Discharge: Stable with no complications to procedure   Diet on Discharge: Same as before.  Activity: as per instruction sheet.  Discharge to: Home with a responsible adult.  Follow up: 2-4 weeks       Please call my office or pager at [phone] if  experienced any weakness or loss of sensation, fever > 101.5, pain uncontrolled with oral medications, persistent nausea/vomiting/or diarrhea, redness or drainage from the incisions, or any other worrisome concerns. If physician on call was not reached or could not communicate with our office for any reason please go to the nearest emergency department

## 2023-06-16 RX ORDER — ROSUVASTATIN CALCIUM 20 MG/1
20 TABLET, COATED ORAL DAILY
Qty: 90 TABLET | Refills: 3 | Status: SHIPPED | OUTPATIENT
Start: 2023-06-16

## 2023-06-20 ENCOUNTER — TELEPHONE (OUTPATIENT)
Dept: PAIN MEDICINE | Facility: CLINIC | Age: 78
End: 2023-06-20
Payer: MEDICARE

## 2023-06-20 DIAGNOSIS — M47.817 LUMBOSACRAL SPONDYLOSIS WITHOUT MYELOPATHY: Primary | ICD-10-CM

## 2023-06-20 NOTE — TELEPHONE ENCOUNTER
----- Message from Julia Gooden sent at 2023 10:42 AM CDT -----  Contact: PATIENT  Adan Bermudez  MRN: [id number]  : 1945  PCP: Angus Ervin  Home Phone      [phone]  Work Phone      Not on file.  Mobile          [phone]      MESSAGE: Patient is returning a call.        Phone: [phone] or [phone]

## 2023-06-20 NOTE — TELEPHONE ENCOUNTER
Medial Branch Follow Up Phone Call    Procedure: Bilateral L3,4,5 medial branch diagnostic blocks  Procedure Date: 05/19/2023    The patient was contacted via telephone after the diagnostic block to assess post-operative pain scores and function.  The following was reported.    The patient reports greater than 80% improvement in pain in the first few hours after the procedure.  The patient reports greater than 50% improvement in function in the first few hours after the procedure.    Overall % relief: 90%

## 2023-06-29 NOTE — TELEPHONE ENCOUNTER
Right then left L3, L4, and L5 Lumbar RFA scheduled 07/21/2023 and 07/28/2023. Advised that pre admit would contact patient with time of procedure. Advised patient to contact office if he starts any type of antibiotics or new blood thinners. Voiced understanding.

## 2023-07-20 ENCOUNTER — LAB VISIT (OUTPATIENT)
Dept: LAB | Facility: HOSPITAL | Age: 78
End: 2023-07-20
Attending: STUDENT IN AN ORGANIZED HEALTH CARE EDUCATION/TRAINING PROGRAM
Payer: MEDICARE

## 2023-07-20 DIAGNOSIS — Z79.4 TYPE 2 DIABETES MELLITUS WITH STAGE 3A CHRONIC KIDNEY DISEASE, WITH LONG-TERM CURRENT USE OF INSULIN: ICD-10-CM

## 2023-07-20 DIAGNOSIS — E78.2 MIXED HYPERLIPIDEMIA: ICD-10-CM

## 2023-07-20 DIAGNOSIS — R79.89 ELEVATED TSH: ICD-10-CM

## 2023-07-20 DIAGNOSIS — N18.31 TYPE 2 DIABETES MELLITUS WITH STAGE 3A CHRONIC KIDNEY DISEASE, WITH LONG-TERM CURRENT USE OF INSULIN: ICD-10-CM

## 2023-07-20 DIAGNOSIS — E11.22 TYPE 2 DIABETES MELLITUS WITH STAGE 3A CHRONIC KIDNEY DISEASE, WITH LONG-TERM CURRENT USE OF INSULIN: ICD-10-CM

## 2023-07-20 LAB
ALBUMIN/CREAT UR: 8.7 UG/MG (ref 0–30)
ANION GAP SERPL CALC-SCNC: 9 MMOL/L (ref 8–16)
BUN SERPL-MCNC: 20 MG/DL (ref 8–23)
CALCIUM SERPL-MCNC: 8.9 MG/DL (ref 8.7–10.5)
CHLORIDE SERPL-SCNC: 105 MMOL/L (ref 95–110)
CHOLEST SERPL-MCNC: 115 MG/DL (ref 120–199)
CHOLEST/HDLC SERPL: 2.4 {RATIO} (ref 2–5)
CO2 SERPL-SCNC: 27 MMOL/L (ref 23–29)
CREAT SERPL-MCNC: 1.5 MG/DL (ref 0.5–1.4)
CREAT UR-MCNC: 127.1 MG/DL (ref 23–375)
EST. GFR  (NO RACE VARIABLE): 48 ML/MIN/1.73 M^2
ESTIMATED AVG GLUCOSE: 120 MG/DL (ref 68–131)
GLUCOSE SERPL-MCNC: 96 MG/DL (ref 70–110)
HBA1C MFR BLD: 5.8 % (ref 4–5.6)
HDLC SERPL-MCNC: 47 MG/DL (ref 40–75)
HDLC SERPL: 40.9 % (ref 20–50)
LDLC SERPL CALC-MCNC: 53.2 MG/DL (ref 63–159)
MICROALBUMIN UR DL<=1MG/L-MCNC: 11 UG/ML
NONHDLC SERPL-MCNC: 68 MG/DL
POTASSIUM SERPL-SCNC: 4 MMOL/L (ref 3.5–5.1)
SODIUM SERPL-SCNC: 141 MMOL/L (ref 136–145)
T4 FREE SERPL-MCNC: 1.11 NG/DL (ref 0.71–1.51)
TRIGL SERPL-MCNC: 74 MG/DL (ref 30–150)
TSH SERPL DL<=0.005 MIU/L-ACNC: 7.15 UIU/ML (ref 0.4–4)

## 2023-07-20 PROCEDURE — 80061 LIPID PANEL: CPT | Performed by: STUDENT IN AN ORGANIZED HEALTH CARE EDUCATION/TRAINING PROGRAM

## 2023-07-20 PROCEDURE — 84443 ASSAY THYROID STIM HORMONE: CPT | Performed by: STUDENT IN AN ORGANIZED HEALTH CARE EDUCATION/TRAINING PROGRAM

## 2023-07-20 PROCEDURE — 36415 COLL VENOUS BLD VENIPUNCTURE: CPT | Performed by: STUDENT IN AN ORGANIZED HEALTH CARE EDUCATION/TRAINING PROGRAM

## 2023-07-20 PROCEDURE — 84439 ASSAY OF FREE THYROXINE: CPT | Performed by: STUDENT IN AN ORGANIZED HEALTH CARE EDUCATION/TRAINING PROGRAM

## 2023-07-20 PROCEDURE — 82570 ASSAY OF URINE CREATININE: CPT | Performed by: STUDENT IN AN ORGANIZED HEALTH CARE EDUCATION/TRAINING PROGRAM

## 2023-07-20 PROCEDURE — 83036 HEMOGLOBIN GLYCOSYLATED A1C: CPT | Performed by: STUDENT IN AN ORGANIZED HEALTH CARE EDUCATION/TRAINING PROGRAM

## 2023-07-20 PROCEDURE — 80048 BASIC METABOLIC PNL TOTAL CA: CPT | Performed by: STUDENT IN AN ORGANIZED HEALTH CARE EDUCATION/TRAINING PROGRAM

## 2023-07-21 ENCOUNTER — HOSPITAL ENCOUNTER (EMERGENCY)
Facility: HOSPITAL | Age: 78
Discharge: HOME | End: 2023-07-21
Attending: STUDENT IN AN ORGANIZED HEALTH CARE EDUCATION/TRAINING PROGRAM
Payer: MEDICARE

## 2023-07-21 ENCOUNTER — TELEPHONE (OUTPATIENT)
Dept: ENDOCRINOLOGY | Facility: CLINIC | Age: 78
End: 2023-07-21
Payer: MEDICARE

## 2023-07-21 VITALS
TEMPERATURE: 98 F | BODY MASS INDEX: 35.67 KG/M2 | WEIGHT: 255.75 LBS | SYSTOLIC BLOOD PRESSURE: 164 MMHG | RESPIRATION RATE: 18 BRPM | HEART RATE: 68 BPM | OXYGEN SATURATION: 95 % | DIASTOLIC BLOOD PRESSURE: 92 MMHG

## 2023-07-21 DIAGNOSIS — I10 HTN (HYPERTENSION): ICD-10-CM

## 2023-07-21 LAB
ALBUMIN SERPL BCP-MCNC: 3.3 G/DL (ref 3.5–5.2)
ALP SERPL-CCNC: 73 U/L (ref 55–135)
ALT SERPL W/O P-5'-P-CCNC: 13 U/L (ref 10–44)
ANION GAP SERPL CALC-SCNC: 10 MMOL/L (ref 8–16)
AST SERPL-CCNC: 15 U/L (ref 10–40)
BASOPHILS # BLD AUTO: 0.05 K/UL (ref 0–0.2)
BASOPHILS NFR BLD: 0.9 % (ref 0–1.9)
BILIRUB SERPL-MCNC: 0.6 MG/DL (ref 0.1–1)
BUN SERPL-MCNC: 24 MG/DL (ref 8–23)
CALCIUM SERPL-MCNC: 8.6 MG/DL (ref 8.7–10.5)
CHLORIDE SERPL-SCNC: 105 MMOL/L (ref 95–110)
CO2 SERPL-SCNC: 24 MMOL/L (ref 23–29)
CREAT SERPL-MCNC: 1.4 MG/DL (ref 0.5–1.4)
DIFFERENTIAL METHOD: ABNORMAL
EOSINOPHIL # BLD AUTO: 0 K/UL (ref 0–0.5)
EOSINOPHIL NFR BLD: 0.5 % (ref 0–8)
ERYTHROCYTE [DISTWIDTH] IN BLOOD BY AUTOMATED COUNT: 14.1 % (ref 11.5–14.5)
EST. GFR  (NO RACE VARIABLE): 52 ML/MIN/1.73 M^2
GLUCOSE SERPL-MCNC: 96 MG/DL (ref 70–110)
HCT VFR BLD AUTO: 41.5 % (ref 40–54)
HGB BLD-MCNC: 13.4 G/DL (ref 14–18)
IMM GRANULOCYTES # BLD AUTO: 0.02 K/UL (ref 0–0.04)
IMM GRANULOCYTES NFR BLD AUTO: 0.4 % (ref 0–0.5)
LYMPHOCYTES # BLD AUTO: 1.5 K/UL (ref 1–4.8)
LYMPHOCYTES NFR BLD: 26.9 % (ref 18–48)
MCH RBC QN AUTO: 27.8 PG (ref 27–31)
MCHC RBC AUTO-ENTMCNC: 32.3 G/DL (ref 32–36)
MCV RBC AUTO: 86 FL (ref 82–98)
MONOCYTES # BLD AUTO: 0.7 K/UL (ref 0.3–1)
MONOCYTES NFR BLD: 12.1 % (ref 4–15)
NEUTROPHILS # BLD AUTO: 3.2 K/UL (ref 1.8–7.7)
NEUTROPHILS NFR BLD: 59.2 % (ref 38–73)
NRBC BLD-RTO: 0 /100 WBC
PLATELET # BLD AUTO: 144 K/UL (ref 150–450)
PMV BLD AUTO: 11.2 FL (ref 9.2–12.9)
POTASSIUM SERPL-SCNC: 3.9 MMOL/L (ref 3.5–5.1)
PROT SERPL-MCNC: 6.7 G/DL (ref 6–8.4)
RBC # BLD AUTO: 4.82 M/UL (ref 4.6–6.2)
SODIUM SERPL-SCNC: 139 MMOL/L (ref 136–145)
WBC # BLD AUTO: 5.46 K/UL (ref 3.9–12.7)

## 2023-07-21 PROCEDURE — 85025 COMPLETE CBC W/AUTO DIFF WBC: CPT | Performed by: STUDENT IN AN ORGANIZED HEALTH CARE EDUCATION/TRAINING PROGRAM

## 2023-07-21 PROCEDURE — 80053 COMPREHEN METABOLIC PANEL: CPT | Performed by: STUDENT IN AN ORGANIZED HEALTH CARE EDUCATION/TRAINING PROGRAM

## 2023-07-21 PROCEDURE — 93005 ELECTROCARDIOGRAM TRACING: CPT

## 2023-07-21 PROCEDURE — 99284 EMERGENCY DEPT VISIT MOD MDM: CPT

## 2023-07-21 PROCEDURE — 93010 EKG 12-LEAD: ICD-10-PCS | Mod: ,,, | Performed by: INTERNAL MEDICINE

## 2023-07-21 PROCEDURE — 93010 ELECTROCARDIOGRAM REPORT: CPT | Mod: ,,, | Performed by: INTERNAL MEDICINE

## 2023-07-21 PROCEDURE — 36415 COLL VENOUS BLD VENIPUNCTURE: CPT | Performed by: STUDENT IN AN ORGANIZED HEALTH CARE EDUCATION/TRAINING PROGRAM

## 2023-07-21 PROCEDURE — 25000003 PHARM REV CODE 250: Performed by: STUDENT IN AN ORGANIZED HEALTH CARE EDUCATION/TRAINING PROGRAM

## 2023-07-21 RX ORDER — CLONIDINE HYDROCHLORIDE 0.1 MG/1
0.1 TABLET ORAL
Status: COMPLETED | OUTPATIENT
Start: 2023-07-21 | End: 2023-07-21

## 2023-07-21 RX ADMIN — CLONIDINE HYDROCHLORIDE 0.1 MG: 0.1 TABLET ORAL at 08:07

## 2023-07-21 RX ADMIN — CLONIDINE HYDROCHLORIDE 0.1 MG: 0.1 TABLET ORAL at 07:07

## 2023-07-21 NOTE — ED PROVIDER NOTES
Encounter Date: 7/21/2023       History     Chief Complaint   Patient presents with    Hypertension     Patient to ER from surgery, states he went in to have a procedure and he is hypotensive, reports taking his BP meds as prescribed       78-year-old male with history of diabetes presenting to the ED from outpatient surgery unit.  Patient was scheduled to have a nerve ablation procedure performed by pain management today.  Patient was cleared yesterday medically, however on arrival today patient's blood pressure was elevated, systolic in the 210's.  Patient denies any symptoms.  No headache, vision changes, numbness, weakness, difficulty speaking.  No chest pain or shortness of breath.  Denies any abdominal pain, nausea, vomiting, diarrhea.  Patient reports taking his blood pressure medications this morning as prescribed.  Surgery team were concerned due to hypertension in the fact that patient takes Eliquis.    Review of patient's allergies indicates:   Allergen Reactions    Bee sting [allergen ext-venom-honey bee] Shortness Of Breath     Past Medical History:   Diagnosis Date    Diabetes mellitus     HLD (hyperlipidemia)     Venous insufficiency of both lower extremities      Past Surgical History:   Procedure Laterality Date    INJECTION OF ANESTHETIC AGENT AROUND MEDIAL BRANCH NERVES INNERVATING CERVICAL FACET JOINT Bilateral 9/16/2022    Procedure: CERVICAL MEDIAL BRANCH NERVE BLOCK (C3-4,C4-5);  Surgeon: Amber Doyle MD;  Location: [facility] OR;  Service: Pain Management;  Laterality: Bilateral;    INJECTION OF ANESTHETIC AGENT AROUND MEDIAL BRANCH NERVES INNERVATING LUMBAR FACET JOINT Bilateral 4/21/2023    Procedure: LUMBAR MEDIAL BRANCH NERVE BLOCK (L3,4,5);  Surgeon: Zoie Garay DO;  Location: [facility] OR;  Service: Pain Management;  Laterality: Bilateral;    INJECTION OF ANESTHETIC AGENT AROUND MEDIAL BRANCH NERVES INNERVATING LUMBAR FACET JOINT Bilateral 5/19/2023    Procedure: LUMBAR MEDIAL BRANCH  NERVE BLOCK (L3,4,5);  Surgeon: Zoie Garay DO;  Location: [facility] OR;  Service: Pain Management;  Laterality: Bilateral;    TOTAL KNEE REPLACEMENT USING COMPUTER NAVIGATION Bilateral 07/2019     Family History   Problem Relation Age of Onset    Heart disease Mother     Heart disease Father      Social History     Tobacco Use    Smoking status: Never    Smokeless tobacco: Never   Substance Use Topics    Alcohol use: Not Currently     Review of Systems   Constitutional:  Negative for fever.        Hypertension   HENT:  Negative for sore throat.    Respiratory:  Negative for shortness of breath.    Cardiovascular:  Negative for chest pain.   Gastrointestinal:  Negative for nausea.   Genitourinary:  Negative for dysuria.   Musculoskeletal:  Negative for back pain.   Skin:  Negative for rash.   Neurological:  Negative for weakness.   Hematological:  Does not bruise/bleed easily.     Physical Exam     Initial Vitals [07/21/23 0737]   BP Pulse Resp Temp SpO2   (!) 204/105 68 18 98 °F (36.7 °C) 95 %      MAP       --         Physical Exam    Nursing note and vitals reviewed.  Constitutional: He appears well-developed. He is not diaphoretic. No distress.   Eyes: EOM are normal.   Neck:   Normal range of motion.  Cardiovascular:            No murmur heard.  Pulmonary/Chest: No respiratory distress. He has no wheezes. He has no rales.   Abdominal: He exhibits no distension. There is no abdominal tenderness. There is no rebound.   Musculoskeletal:         General: Normal range of motion.      Cervical back: Normal range of motion.     Neurological: He is alert and oriented to person, place, and time.   Alert and oriented to person place and time. No facial droop. CN 2-12 intact. Fluent speech with expression and comprehension. Strength 5/5 and sensation intact in upper and lower extremities. Sensation present and equal in both sides of face. Finger to nose testing normal.     Skin: Skin is warm.   Psychiatric: He has a  normal mood and affect.       ED Course   Procedures  Labs Reviewed   CBC W/ AUTO DIFFERENTIAL   COMPREHENSIVE METABOLIC PANEL     EKG Readings: (Independently Interpreted)   Initial Reading: No STEMI. Rhythm: Normal Sinus Rhythm. Heart Rate: 65. Ectopy: No Ectopy. Conduction: Normal and 1st Degree AV Block.     Imaging Results    None          Medications   cloNIDine tablet 0.1 mg (0.1 mg Oral Given 7/21/23 0750)     Medical Decision Making:   Differential Diagnosis:   DDX:  Patient presenting with hypertension.  Patient has no neurologic deficits, and is not having any headache or vision changes, I have a very low suspicion for stroke in this patient.  Patient has no other complaints.  Given extent of hypertension, will screen for kidney injury.   DX:  CBC, CMP, EKG  TX:  Antihypertensive PRN  Dispo:  Discharge home with close follow-up with cardiologist in pain management.                            Clinical Impression:   Final diagnoses:  [I10] HTN (hypertension)               Dimitry Samaniego MD  07/21/23 0755       Dimitry Samaniego MD  07/21/23 0819

## 2023-07-21 NOTE — DISCHARGE INSTRUCTIONS
Please follow up with your primary care physician within 2 days. Ensure that you review all lab work results and/or imaging results. If you have any questions about your discharge paperwork please call the Emergency Department.     Return to the ED for any headache, vision changes, dizziness, lightheadedness, nausea, vomiting, speech changes, numbness or tingling in extremitites, or any new or worsening symptoms.    Return to the ED for any chest pain, upper abdominal pain, shortness of breath, lightheadedness, or any new or worsening symptoms.     If you were prescribed antibiotics, please take them to completion. If you were prescribed a narcotic or any sedating medication, do not drive or operate heavy equipment or machinery while taking these medications.  If you were diagnosed with a seizure, syncope, any loss of consciousness or decreased alertness, do not drive, swim, operate heavy machinery, or per yourself in any position where a sudden loss of consciousness could put herself or others in danger.    Thank you for visiting [facility], Department of Emergency Medicine. Please see the entirety of the educational materials provided. Please note that a visit to the emergency department does not substitute ongoing care from a primary medical provider or specialist. Please ensure to follow up as recommended. However, please return to the emergency department immediately if symptoms do not improve as discussed, symptoms worsen, new symptoms develop, difficulty in following up or for any of your concerns or issues. Please note on discharge you are acknowledging understanding and agreement on medical evaluation, management recommendations and follow up recommendations.

## 2023-07-21 NOTE — TELEPHONE ENCOUNTER
Tried contacting pt, no answer, no VM.      ----- Message from Jose Wilcox MD sent at 7/20/2023  4:28 PM CDT -----  Please inform that A1c is 5.8 which is great and stable from before.    Kidney, liver and thyroid functions are normal.     Cholesterol levels are at goal

## 2023-07-24 ENCOUNTER — TELEPHONE (OUTPATIENT)
Dept: ENDOCRINOLOGY | Facility: CLINIC | Age: 78
End: 2023-07-24
Payer: MEDICARE

## 2023-07-24 NOTE — TELEPHONE ENCOUNTER
Tried contacting pt's phone, not a working number, called daughter, no answer LVM.        ----- Message from Jose Wilcox MD sent at 7/20/2023  4:28 PM CDT -----  Please inform that A1c is 5.8 which is great and stable from before.    Kidney, liver and thyroid functions are normal.     Cholesterol levels are at goal

## 2023-07-26 ENCOUNTER — OFFICE VISIT (OUTPATIENT)
Dept: CARDIOLOGY | Facility: CLINIC | Age: 78
End: 2023-07-26
Payer: MEDICARE

## 2023-07-26 VITALS
OXYGEN SATURATION: 95 % | WEIGHT: 249.63 LBS | HEART RATE: 69 BPM | HEIGHT: 71 IN | SYSTOLIC BLOOD PRESSURE: 160 MMHG | DIASTOLIC BLOOD PRESSURE: 80 MMHG | BODY MASS INDEX: 34.95 KG/M2

## 2023-07-26 DIAGNOSIS — I25.10 CORONARY ARTERY DISEASE INVOLVING NATIVE CORONARY ARTERY OF NATIVE HEART WITHOUT ANGINA PECTORIS: ICD-10-CM

## 2023-07-26 DIAGNOSIS — I10 ESSENTIAL HYPERTENSION: ICD-10-CM

## 2023-07-26 DIAGNOSIS — N18.31 TYPE 2 DIABETES MELLITUS WITH STAGE 3A CHRONIC KIDNEY DISEASE, WITH LONG-TERM CURRENT USE OF INSULIN: ICD-10-CM

## 2023-07-26 DIAGNOSIS — N18.31 STAGE 3A CHRONIC KIDNEY DISEASE: ICD-10-CM

## 2023-07-26 DIAGNOSIS — E11.22 TYPE 2 DIABETES MELLITUS WITH STAGE 3A CHRONIC KIDNEY DISEASE, WITH LONG-TERM CURRENT USE OF INSULIN: ICD-10-CM

## 2023-07-26 DIAGNOSIS — I10 PRIMARY HYPERTENSION: Primary | ICD-10-CM

## 2023-07-26 DIAGNOSIS — E66.01 CLASS 2 SEVERE OBESITY DUE TO EXCESS CALORIES WITH SERIOUS COMORBIDITY AND BODY MASS INDEX (BMI) OF 35.0 TO 35.9 IN ADULT: ICD-10-CM

## 2023-07-26 DIAGNOSIS — E78.2 MIXED HYPERLIPIDEMIA: ICD-10-CM

## 2023-07-26 DIAGNOSIS — Z79.4 TYPE 2 DIABETES MELLITUS WITH STAGE 3A CHRONIC KIDNEY DISEASE, WITH LONG-TERM CURRENT USE OF INSULIN: ICD-10-CM

## 2023-07-26 DIAGNOSIS — I48.0 PAROXYSMAL ATRIAL FIBRILLATION: ICD-10-CM

## 2023-07-26 PROCEDURE — 3077F PR MOST RECENT SYSTOLIC BLOOD PRESSURE >= 140 MM HG: ICD-10-PCS | Mod: CPTII,S$GLB,, | Performed by: INTERNAL MEDICINE

## 2023-07-26 PROCEDURE — 99214 PR OFFICE/OUTPT VISIT, EST, LEVL IV, 30-39 MIN: ICD-10-PCS | Mod: S$GLB,,, | Performed by: INTERNAL MEDICINE

## 2023-07-26 PROCEDURE — 1159F PR MEDICATION LIST DOCUMENTED IN MEDICAL RECORD: ICD-10-PCS | Mod: CPTII,S$GLB,, | Performed by: INTERNAL MEDICINE

## 2023-07-26 PROCEDURE — 3079F DIAST BP 80-89 MM HG: CPT | Mod: CPTII,S$GLB,, | Performed by: INTERNAL MEDICINE

## 2023-07-26 PROCEDURE — 1160F RVW MEDS BY RX/DR IN RCRD: CPT | Mod: CPTII,S$GLB,, | Performed by: INTERNAL MEDICINE

## 2023-07-26 PROCEDURE — 1159F MED LIST DOCD IN RCRD: CPT | Mod: CPTII,S$GLB,, | Performed by: INTERNAL MEDICINE

## 2023-07-26 PROCEDURE — 99214 OFFICE O/P EST MOD 30 MIN: CPT | Mod: S$GLB,,, | Performed by: INTERNAL MEDICINE

## 2023-07-26 PROCEDURE — 3077F SYST BP >= 140 MM HG: CPT | Mod: CPTII,S$GLB,, | Performed by: INTERNAL MEDICINE

## 2023-07-26 PROCEDURE — 99999 PR PBB SHADOW E&M-EST. PATIENT-LVL II: ICD-10-PCS | Mod: PBBFAC,,, | Performed by: INTERNAL MEDICINE

## 2023-07-26 PROCEDURE — 99999 PR PBB SHADOW E&M-EST. PATIENT-LVL II: CPT | Mod: PBBFAC,,, | Performed by: INTERNAL MEDICINE

## 2023-07-26 PROCEDURE — 3079F PR MOST RECENT DIASTOLIC BLOOD PRESSURE 80-89 MM HG: ICD-10-PCS | Mod: CPTII,S$GLB,, | Performed by: INTERNAL MEDICINE

## 2023-07-26 PROCEDURE — 1160F PR REVIEW ALL MEDS BY PRESCRIBER/CLIN PHARMACIST DOCUMENTED: ICD-10-PCS | Mod: CPTII,S$GLB,, | Performed by: INTERNAL MEDICINE

## 2023-07-26 RX ORDER — AMLODIPINE BESYLATE 5 MG/1
5 TABLET ORAL DAILY
Qty: 90 TABLET | Refills: 4 | Status: SHIPPED | OUTPATIENT
Start: 2023-07-26

## 2023-07-26 RX ORDER — CLONIDINE HYDROCHLORIDE 0.1 MG/1
0.1 TABLET ORAL 2 TIMES DAILY
Qty: 20 TABLET | Refills: 4 | Status: SHIPPED | OUTPATIENT
Start: 2023-07-26

## 2023-07-26 NOTE — ASSESSMENT & PLAN NOTE
Amlodipine 5 mg added to Altace 10 mg daily for better blood pressure control.  He is also on doxazosin 2 mg which will be continued.  He monitors at home.

## 2023-07-26 NOTE — ASSESSMENT & PLAN NOTE
No active angina.  Last stress test 2019.  History of calcium score 400.  He is quite inactive due to his chronic back problems.

## 2023-07-26 NOTE — PROGRESS NOTES
[facility] Cardiology     Subjective:    Patient ID:  Adan Bermudez is a 78 y.o. male who presents for follow-up of Atrial Fibrillation, Hypertension, Hyperlipidemia, Coronary Artery Disease, and Shortness of Breath    Review of patient's allergies indicates:   Allergen Reactions    Bee sting [allergen ext-venom-honey bee] Shortness Of Breath      He remains in sinus rhythm off amiodarone withdrawn due to bradycardia and weakness.  I also withdrew Bystolic.  He remains on Altace 10 mg as well as doxazosin 2 mg for hypertension.  Doxazosin was ordered for prostatism.  He has renal insufficiency.  He has underlying diabetes, diet-controlled.  There is history of coronary disease, calcium score 400 in 2019 with negative Lexiscan at that time.  He is on Demadex 20 mg for leg swelling related to renal insufficiency and shortness of breath.    He was recently sent to the emergency department because of blood pressure 210 systolic.  He was in for a spine treatment.  He had to be there at 6:30 a.m. in the morning.  He takes his blood pressure medication at 530 in the morning.  He was released from the ED after hypertensive treatment.  He did have a recent TSH July 20, 2023 with 7.1 result.  Endocrine is following.  Given previous amiodarone therapy, Dr. Wilcox is waiting to see his next TSH before recommending replacement therapy.      Review of Systems   Constitutional: Negative for chills, decreased appetite, diaphoresis, fever, malaise/fatigue, night sweats, weight gain and weight loss.   HENT:  Negative for congestion, ear discharge, ear pain, hearing loss, hoarse voice, nosebleeds, odynophagia, sore throat, stridor and tinnitus.    Eyes:  Negative for blurred vision, discharge, double vision, pain, photophobia, redness, vision loss in left eye, vision loss in right eye, visual disturbance and visual halos.   Cardiovascular:  Positive for  dyspnea on exertion and leg swelling. Negative for chest pain, claudication, cyanosis, irregular heartbeat, near-syncope, orthopnea, palpitations, paroxysmal nocturnal dyspnea and syncope.   Respiratory:  Positive for shortness of breath. Negative for cough, hemoptysis, sleep disturbances due to breathing, snoring, sputum production and wheezing.    Endocrine: Negative for cold intolerance, heat intolerance, polydipsia, polyphagia and polyuria.   Hematologic/Lymphatic: Negative for adenopathy and bleeding problem. Does not bruise/bleed easily.   Skin:  Negative for color change, dry skin, flushing, itching, nail changes, poor wound healing, rash, skin cancer, suspicious lesions and unusual hair distribution.   Musculoskeletal:  Positive for back pain. Negative for arthritis, falls, gout, joint pain, joint swelling, muscle cramps, muscle weakness, myalgias, neck pain and stiffness.   Gastrointestinal:  Negative for bloating, abdominal pain, anorexia, change in bowel habit, bowel incontinence, constipation, diarrhea, dysphagia, excessive appetite, flatus, heartburn, hematemesis, hematochezia, hemorrhoids, jaundice, melena, nausea and vomiting.   Genitourinary:  Negative for bladder incontinence, decreased libido, dysuria, flank pain, frequency, genital sores, hematuria, hesitancy, incomplete emptying, nocturia and urgency.   Neurological:  Positive for loss of balance. Negative for aphonia, brief paralysis, difficulty with concentration, disturbances in coordination, excessive daytime sleepiness, dizziness, focal weakness, headaches, light-headedness, numbness, paresthesias, seizures, sensory change, tremors, vertigo and weakness.   Psychiatric/Behavioral:  Negative for altered mental status, depression, hallucinations, memory loss, substance abuse, suicidal ideas and thoughts of violence. The patient does not have insomnia and is not nervous/anxious.    Allergic/Immunologic: Negative for hives and persistent  "infections.      Objective:       Vitals:    07/26/23 1514   BP: (!) 160/80   Pulse: 69   SpO2: 95%   Weight: 113.2 kg (249 lb 9.6 oz)   Height: 5' 11" (1.803 m)    Physical Exam  Constitutional:       General: He is not in acute distress.     Appearance: He is well-developed. He is not diaphoretic.   HENT:      Head: Normocephalic and atraumatic.      Nose: Nose normal.   Eyes:      General: No scleral icterus.        Right eye: No discharge.      Conjunctiva/sclera: Conjunctivae normal.      Pupils: Pupils are equal, round, and reactive to light.   Neck:      Thyroid: No thyromegaly.      Vascular: No JVD.      Trachea: No tracheal deviation.   Cardiovascular:      Rate and Rhythm: Normal rate and regular rhythm.      Pulses:           Carotid pulses are 2+ on the right side and 2+ on the left side.       Radial pulses are 2+ on the right side and 2+ on the left side.        Dorsalis pedis pulses are 1+ on the right side and 1+ on the left side.        Posterior tibial pulses are 1+ on the right side and 1+ on the left side.      Heart sounds: Normal heart sounds. No murmur heard.    No friction rub. No gallop.   Pulmonary:      Effort: Pulmonary effort is normal. No respiratory distress.      Breath sounds: Normal breath sounds. No stridor. No wheezing or rales.   Chest:      Chest wall: No tenderness.   Abdominal:      General: Bowel sounds are normal. There is no distension.      Palpations: Abdomen is soft. There is no mass.      Tenderness: There is no abdominal tenderness. There is no guarding or rebound.   Musculoskeletal:         General: No tenderness. Normal range of motion.      Cervical back: Normal range of motion and neck supple.      Comments: Trace edema of the ankles   Lymphadenopathy:      Cervical: No cervical adenopathy.   Skin:     General: Skin is warm and dry.      Coloration: Skin is not pale.      Findings: No erythema or rash.   Neurological:      Mental Status: He is alert and oriented " to person, place, and time.      Cranial Nerves: No cranial nerve deficit.      Coordination: Coordination normal.   Psychiatric:         Behavior: Behavior normal.         Thought Content: Thought content normal.         Judgment: Judgment normal.         Assessment:       1. Primary hypertension    2. Coronary artery disease involving native coronary artery of native heart without angina pectoris    3. Essential hypertension    4. Mixed hyperlipidemia    5. Paroxysmal atrial fibrillation    6. Stage 3a chronic kidney disease    7. Type 2 diabetes mellitus with stage 3a chronic kidney disease, with long-term current use of insulin    8. Class 2 severe obesity due to excess calories with serious comorbidity and body mass index (BMI) of 35.0 to 35.9 in adult      Results for orders placed or performed during the hospital encounter of 07/21/23   Complete Blood Count (CBC)   Result Value Ref Range    WBC 5.46 3.90 - 12.70 K/uL    RBC 4.82 4.60 - 6.20 M/uL    Hemoglobin 13.4 (L) 14.0 - 18.0 g/dL    Hematocrit 41.5 40.0 - 54.0 %    MCV 86 82 - 98 fL    MCH 27.8 27.0 - 31.0 pg    MCHC 32.3 32.0 - 36.0 g/dL    RDW 14.1 11.5 - 14.5 %    Platelets 144 (L) 150 - 450 K/uL    MPV 11.2 9.2 - 12.9 fL    Immature Granulocytes 0.4 0.0 - 0.5 %    Gran # (ANC) 3.2 1.8 - 7.7 K/uL    Immature Grans (Abs) 0.02 0.00 - 0.04 K/uL    Lymph # 1.5 1.0 - 4.8 K/uL    Mono # 0.7 0.3 - 1.0 K/uL    Eos # 0.0 0.0 - 0.5 K/uL    Baso # 0.05 0.00 - 0.20 K/uL    nRBC 0 0 /100 WBC    Gran % 59.2 38.0 - 73.0 %    Lymph % 26.9 18.0 - 48.0 %    Mono % 12.1 4.0 - 15.0 %    Eosinophil % 0.5 0.0 - 8.0 %    Basophil % 0.9 0.0 - 1.9 %    Differential Method Automated    Comprehensive Metabolic Panel (CMP)   Result Value Ref Range    Sodium 139 136 - 145 mmol/L    Potassium 3.9 3.5 - 5.1 mmol/L    Chloride 105 95 - 110 mmol/L    CO2 24 23 - 29 mmol/L    Glucose 96 70 - 110 mg/dL    BUN 24 (H) 8 - 23 mg/dL    Creatinine 1.4 0.5 - 1.4 mg/dL    Calcium 8.6 (L) 8.7  - 10.5 mg/dL    Total Protein 6.7 6.0 - 8.4 g/dL    Albumin 3.3 (L) 3.5 - 5.2 g/dL    Total Bilirubin 0.6 0.1 - 1.0 mg/dL    Alkaline Phosphatase 73 55 - 135 U/L    AST 15 10 - 40 U/L    ALT 13 10 - 44 U/L    eGFR 52 (A) >60 mL/min/1.73 m^2    Anion Gap 10 8 - 16 mmol/L         Current Outpatient Medications:     amLODIPine (NORVASC) 5 MG tablet, Take 1 tablet (5 mg total) by mouth once daily., Disp: 90 tablet, Rfl: 4    blood-glucose meter,continuous (DEXCOM G6 ) Misc, 1 each by Misc.(Non-Drug; Combo Route) route every 3 (three) months., Disp: 1 each, Rfl: 0    blood-glucose transmitter (DEXCOM G6 TRANSMITTER) Julia, 1 each by Misc.(Non-Drug; Combo Route) route once. for 1 dose, Disp: 1 each, Rfl: 3    cloNIDine (CATAPRES) 0.1 MG tablet, Take 1 tablet (0.1 mg total) by mouth 2 (two) times daily., Disp: 20 tablet, Rfl: 4    doxazosin (CARDURA) 2 MG tablet, Take 2 mg by mouth once daily., Disp: , Rfl:     ELIQUIS 5 mg Tab, Take 1 tablet (5 mg total) by mouth 2 (two) times daily., Disp: 180 tablet, Rfl: 0    EPINEPHrine (EPIPEN) 0.3 mg/0.3 mL AtIn, INJECT AS DIRECTED, Disp: , Rfl:     meclizine (ANTIVERT) 25 mg tablet, Take 25 mg by mouth 3 (three) times daily as needed., Disp: , Rfl:     ramipriL (ALTACE) 10 MG capsule, Take 10 mg by mouth once daily., Disp: , Rfl:     rosuvastatin (CRESTOR) 20 MG tablet, Take 1 tablet (20 mg total) by mouth once daily., Disp: 90 tablet, Rfl: 3    torsemide (DEMADEX) 20 MG Tab, Take 20 mg by mouth once daily., Disp: , Rfl:      Lab Results   Component Value Date    WBC 5.46 07/21/2023    RBC 4.82 07/21/2023    HGB 13.4 (L) 07/21/2023    HCT 41.5 07/21/2023    MCV 86 07/21/2023    MCH 27.8 07/21/2023    MCHC 32.3 07/21/2023    RDW 14.1 07/21/2023     (L) 07/21/2023    MPV 11.2 07/21/2023    GRAN 3.2 07/21/2023    GRAN 59.2 07/21/2023    LYMPH 1.5 07/21/2023    LYMPH 26.9 07/21/2023    MONO 0.7 07/21/2023    MONO 12.1 07/21/2023    EOS 0.0 07/21/2023    BASO 0.05  07/21/2023    EOSINOPHIL 0.5 07/21/2023    BASOPHIL 0.9 07/21/2023        CMP  Lab Results   Component Value Date     07/21/2023    K 3.9 07/21/2023     07/21/2023    CO2 24 07/21/2023    GLU 96 07/21/2023    BUN 24 (H) 07/21/2023    CREATININE 1.4 07/21/2023    CALCIUM 8.6 (L) 07/21/2023    PROT 6.7 07/21/2023    ALBUMIN 3.3 (L) 07/21/2023    BILITOT 0.6 07/21/2023    ALKPHOS 73 07/21/2023    AST 15 07/21/2023    ALT 13 07/21/2023    ANIONGAP 10 07/21/2023        No results found for: LABBLOO, LABURIN, RESPIRATORYC, GSRESP         Results for orders placed or performed during the hospital encounter of 07/21/23   EKG 12-lead    Collection Time: 07/21/23  7:53 AM    Narrative    Test Reason : I10    Vent. Rate : 065 BPM     Atrial Rate : 065 BPM     P-R Int : 266 ms          QRS Dur : 106 ms      QT Int : 480 ms       P-R-T Axes : 065 000 054 degrees     QTc Int : 499 ms    Sinus rhythm with 1st degree A-V block  Nonspecific T wave abnormality  Prolonged QT  Abnormal ECG  When compared with ECG of 18-NOV-2022 10:37,  No significant change was found  Confirmed by Jeff RETANA, John GALLOWAY ([id number]) on 7/24/2023 7:58:44 AM    Referred By: [email or web address]   SELF           Confirmed By:John Aguilar MD                  Plan:       Problem List Items Addressed This Visit          Cardiac/Vascular    Paroxysmal atrial fibrillation     No recurrence of AFib off amiodarone.  Condition stable.    Eliquis is well tolerated and will be continued.         HLD (hyperlipidemia)     Rosuvastatin 20 mg utilized.  Current LDL 53 mg % by labs July 2023.         Essential hypertension     Amlodipine 5 mg added to Altace 10 mg daily for better blood pressure control.  He is also on doxazosin 2 mg which will be continued.  He monitors at home.         Coronary artery disease involving native coronary artery of native heart without angina pectoris     No active angina.  Last stress test 2019.  History of calcium score 400.  He is quite  inactive due to his chronic back problems.              Renal/    Stage 3a chronic kidney disease     Serum creatinine 1.5, GFR 52.  He is on Demadex 20 mg per leg swelling.  It will be continued.              Endocrine    Type 2 diabetes mellitus with stage 3a chronic kidney disease, with long-term current use of insulin     He follows with Endocrine.  Condition stable.  Currently he is diet controlled only.         Class 2 obesity in adult     Weight loss encouraged.          Other Visit Diagnoses       Primary hypertension    -  Primary    Relevant Medications    cloNIDine (CATAPRES) 0.1 MG tablet    amLODIPine (NORVASC) 5 MG tablet               Amlodipine added daily 5 mg for better blood pressure control.  P.r.n. clonidine also prescribed.  I will see him back in 4 months.      He will keep track of his blood pressure and report any elevated readings.    He remains in sinus rhythm without bradycardia.  He is on daily diuretics.  I think he will tolerate low-dose amlodipine.  Eliquis will be continued.             John Aguilar MD  07/26/2023   2:06 PM

## 2023-07-26 NOTE — ASSESSMENT & PLAN NOTE
No recurrence of AFib off amiodarone.  Condition stable.    Eliquis is well tolerated and will be continued.

## 2023-08-14 RX ORDER — APIXABAN 5 MG/1
5 TABLET, FILM COATED ORAL 2 TIMES DAILY
Qty: 180 TABLET | Refills: 0 | Status: SHIPPED | OUTPATIENT
Start: 2023-08-14 | End: 2023-08-15 | Stop reason: SDUPTHER

## 2023-09-27 ENCOUNTER — OFFICE VISIT (OUTPATIENT)
Dept: ENDOCRINOLOGY | Facility: CLINIC | Age: 78
End: 2023-09-27
Payer: MEDICARE

## 2023-09-27 ENCOUNTER — TELEPHONE (OUTPATIENT)
Dept: ENDOCRINOLOGY | Facility: CLINIC | Age: 78
End: 2023-09-27

## 2023-09-27 VITALS
DIASTOLIC BLOOD PRESSURE: 84 MMHG | BODY MASS INDEX: 36.54 KG/M2 | WEIGHT: 261 LBS | HEIGHT: 71 IN | SYSTOLIC BLOOD PRESSURE: 130 MMHG

## 2023-09-27 DIAGNOSIS — E03.8 TSH (THYROID-STIMULATING HORMONE DEFICIENCY): Primary | ICD-10-CM

## 2023-09-27 DIAGNOSIS — Z79.4 TYPE 2 DIABETES MELLITUS WITH STAGE 3A CHRONIC KIDNEY DISEASE, WITH LONG-TERM CURRENT USE OF INSULIN: ICD-10-CM

## 2023-09-27 DIAGNOSIS — E11.22 TYPE 2 DIABETES MELLITUS WITH STAGE 3A CHRONIC KIDNEY DISEASE, WITH LONG-TERM CURRENT USE OF INSULIN: ICD-10-CM

## 2023-09-27 DIAGNOSIS — R79.89 ELEVATED TSH: ICD-10-CM

## 2023-09-27 DIAGNOSIS — N18.31 TYPE 2 DIABETES MELLITUS WITH STAGE 3A CHRONIC KIDNEY DISEASE, WITH LONG-TERM CURRENT USE OF INSULIN: ICD-10-CM

## 2023-09-27 DIAGNOSIS — E78.2 MIXED HYPERLIPIDEMIA: ICD-10-CM

## 2023-09-27 DIAGNOSIS — I10 ESSENTIAL HYPERTENSION: ICD-10-CM

## 2023-09-27 DIAGNOSIS — I48.0 PAROXYSMAL ATRIAL FIBRILLATION: ICD-10-CM

## 2023-09-27 PROCEDURE — 1159F PR MEDICATION LIST DOCUMENTED IN MEDICAL RECORD: ICD-10-PCS | Mod: CPTII,S$GLB,, | Performed by: STUDENT IN AN ORGANIZED HEALTH CARE EDUCATION/TRAINING PROGRAM

## 2023-09-27 PROCEDURE — 3075F SYST BP GE 130 - 139MM HG: CPT | Mod: CPTII,S$GLB,, | Performed by: STUDENT IN AN ORGANIZED HEALTH CARE EDUCATION/TRAINING PROGRAM

## 2023-09-27 PROCEDURE — 1159F MED LIST DOCD IN RCRD: CPT | Mod: CPTII,S$GLB,, | Performed by: STUDENT IN AN ORGANIZED HEALTH CARE EDUCATION/TRAINING PROGRAM

## 2023-09-27 PROCEDURE — 3079F DIAST BP 80-89 MM HG: CPT | Mod: CPTII,S$GLB,, | Performed by: STUDENT IN AN ORGANIZED HEALTH CARE EDUCATION/TRAINING PROGRAM

## 2023-09-27 PROCEDURE — 99214 PR OFFICE/OUTPT VISIT, EST, LEVL IV, 30-39 MIN: ICD-10-PCS | Mod: 25,S$GLB,, | Performed by: STUDENT IN AN ORGANIZED HEALTH CARE EDUCATION/TRAINING PROGRAM

## 2023-09-27 PROCEDURE — 95251 CONT GLUC MNTR ANALYSIS I&R: CPT | Mod: S$GLB,,, | Performed by: STUDENT IN AN ORGANIZED HEALTH CARE EDUCATION/TRAINING PROGRAM

## 2023-09-27 PROCEDURE — 99999 PR PBB SHADOW E&M-EST. PATIENT-LVL III: CPT | Mod: PBBFAC,,, | Performed by: STUDENT IN AN ORGANIZED HEALTH CARE EDUCATION/TRAINING PROGRAM

## 2023-09-27 PROCEDURE — 3079F PR MOST RECENT DIASTOLIC BLOOD PRESSURE 80-89 MM HG: ICD-10-PCS | Mod: CPTII,S$GLB,, | Performed by: STUDENT IN AN ORGANIZED HEALTH CARE EDUCATION/TRAINING PROGRAM

## 2023-09-27 PROCEDURE — 95251 PR GLUCOSE MONITOR, 72 HOUR, PHYS INTERP: ICD-10-PCS | Mod: S$GLB,,, | Performed by: STUDENT IN AN ORGANIZED HEALTH CARE EDUCATION/TRAINING PROGRAM

## 2023-09-27 PROCEDURE — 99214 OFFICE O/P EST MOD 30 MIN: CPT | Mod: 25,S$GLB,, | Performed by: STUDENT IN AN ORGANIZED HEALTH CARE EDUCATION/TRAINING PROGRAM

## 2023-09-27 PROCEDURE — 3075F PR MOST RECENT SYSTOLIC BLOOD PRESS GE 130-139MM HG: ICD-10-PCS | Mod: CPTII,S$GLB,, | Performed by: STUDENT IN AN ORGANIZED HEALTH CARE EDUCATION/TRAINING PROGRAM

## 2023-09-27 PROCEDURE — 99999 PR PBB SHADOW E&M-EST. PATIENT-LVL III: ICD-10-PCS | Mod: PBBFAC,,, | Performed by: STUDENT IN AN ORGANIZED HEALTH CARE EDUCATION/TRAINING PROGRAM

## 2023-09-27 NOTE — ASSESSMENT & PLAN NOTE
Glucose controlled with use of SSI Novolog only with minimal requirements.    Continue same regimen.     Plan  - Continue Novolog SSI 4 times daily  - Continue Dexcom G7    F/u 6 months

## 2023-09-27 NOTE — TELEPHONE ENCOUNTER
Faxed chart note to [employer] [phone]      ----- Message from Jose Wilcox MD sent at 9/27/2023 11:00 AM CDT -----  Send last note to supplier, I think its [employer] based on notes

## 2023-09-27 NOTE — ASSESSMENT & PLAN NOTE
TSH 6-7 which is at goal for his age and history of atrial fibrillation  Use of levothyroxine should be avoided at this time  Amiodarone discontinued Nov 2022 by Cardiology  Will monitor TSH periodically  Keep TSH ~ 3-8

## 2023-09-27 NOTE — PROGRESS NOTES
Subjective:      Patient ID: Adan Bermudez is a 78 y.o. male.    Chief Complaint:  Type 2 diabetes mellitus    History of Present Illness  This is a 78 y.o. male. with a past medical history of type 2 diabetes mellitus, HTN, Afib here for follow up    Type 2 diabetes mellitus    Current diabetes medications:  - Novolog SSI 4 times daily    INSULIN CORRECTION SCALE    Glucose                 Insulin           181-200               +2 units                     201-250               +4 units                      251-300               +6 units                     301-350               +8 units                      >350                    +10 units                       Past diabetes medications:  - Metformin    Lab Results   Component Value Date    CREATININE 1.4 07/21/2023    EGFRNORACEVR 52 (A) 07/21/2023       Known diabetic complications: nephropathy    Weight:  Wt Readings from Last 6 Encounters:   09/27/23 118.4 kg (261 lb)   07/26/23 113.2 kg (249 lb 9.6 oz)   07/21/23 116 kg (255 lb 11.7 oz)   04/04/23 113.1 kg (249 lb 5.4 oz)   04/03/23 113.1 kg (249 lb 5.4 oz)   03/27/23 113 kg (249 lb 1.9 oz)         Family history: Mother side    Blood glucose monitoring at home:       Diabetes Management Status  Statin: Taking  ACE/ARB: Taking    Diabetes Management Status  Statin: Taking  ACE/ARB: Taking    Screening or Prevention Patient's value   HgA1C Testing and Control   Lab Results   Component Value Date    HGBA1C 5.8 (H) 07/20/2023        LDL control Lab Results   Component Value Date    LDLCALC 53.2 (L) 07/20/2023      Nephropathy screening Lab Results   Component Value Date    MICALBCREAT 8.7 07/20/2023            Outside labs  6/1/22  A1c 7.2%    Lab Results   Component Value Date    TSH 7.149 (H) 07/20/2023         Review of Systems  As above    Social and family history reviewed  Current medications and allergies reviewed    Objective:   /84 (BP Location: Right arm, Patient Position: Sitting)   Ht  "5' 11" (1.803 m)   Wt 118.4 kg (261 lb)   BMI 36.40 kg/m²   Physical Exam  Alert, oriented    BP Readings from Last 1 Encounters:   09/27/23 130/84      Wt Readings from Last 1 Encounters:   09/27/23 1011 118.4 kg (261 lb)     Body mass index is 36.4 kg/m².    Lab Review:   Lab Results   Component Value Date    HGBA1C 5.8 (H) 07/20/2023     Lab Results   Component Value Date    CHOL 115 (L) 07/20/2023    HDL 47 07/20/2023    LDLCALC 53.2 (L) 07/20/2023    TRIG 74 07/20/2023    CHOLHDL 40.9 07/20/2023     Lab Results   Component Value Date     07/21/2023    K 3.9 07/21/2023     07/21/2023    CO2 24 07/21/2023    GLU 96 07/21/2023    BUN 24 (H) 07/21/2023    CREATININE 1.4 07/21/2023    CALCIUM 8.6 (L) 07/21/2023    PROT 6.7 07/21/2023    ALBUMIN 3.3 (L) 07/21/2023    BILITOT 0.6 07/21/2023    ALKPHOS 73 07/21/2023    AST 15 07/21/2023    ALT 13 07/21/2023    ANIONGAP 10 07/21/2023    TSH 7.149 (H) 07/20/2023       All pertinent labs reviewed    Assessment and Plan     Type 2 diabetes mellitus with stage 3a chronic kidney disease, with long-term current use of insulin  Glucose controlled with use of SSI Novolog only with minimal requirements.    Continue same regimen.     Plan  - Continue Novolog SSI 4 times daily  - Continue Dexcom G7    F/u 6 months      Elevated TSH  TSH 6-7 which is at goal for his age and history of atrial fibrillation  Use of levothyroxine should be avoided at this time  Amiodarone discontinued Nov 2022 by Cardiology  Will monitor TSH periodically  Keep TSH ~ 3-8    HLD (hyperlipidemia)  Continue statin    Essential hypertension  Continue antihypertensive regimen including ARB/ACEi    Paroxysmal atrial fibrillation  Amiodarone discontinued Nov 2022 by Cardiology  Keep TSH ~ 3-8        Jose Wilcox MD  Endocrinology        "

## 2023-10-02 ENCOUNTER — TELEPHONE (OUTPATIENT)
Dept: ENDOCRINOLOGY | Facility: CLINIC | Age: 78
End: 2023-10-02
Payer: MEDICARE

## 2024-01-05 ENCOUNTER — TELEPHONE (OUTPATIENT)
Dept: ENDOCRINOLOGY | Facility: CLINIC | Age: 79
End: 2024-01-05
Payer: MEDICARE

## 2024-01-05 ENCOUNTER — TELEPHONE (OUTPATIENT)
Dept: CARDIOLOGY | Facility: CLINIC | Age: 79
End: 2024-01-05
Payer: MEDICARE

## 2024-01-05 DIAGNOSIS — N18.31 TYPE 2 DIABETES MELLITUS WITH STAGE 3A CHRONIC KIDNEY DISEASE, WITH LONG-TERM CURRENT USE OF INSULIN: Primary | ICD-10-CM

## 2024-01-05 DIAGNOSIS — E11.22 TYPE 2 DIABETES MELLITUS WITH STAGE 3A CHRONIC KIDNEY DISEASE, WITH LONG-TERM CURRENT USE OF INSULIN: Primary | ICD-10-CM

## 2024-01-05 DIAGNOSIS — Z79.4 TYPE 2 DIABETES MELLITUS WITH STAGE 3A CHRONIC KIDNEY DISEASE, WITH LONG-TERM CURRENT USE OF INSULIN: Primary | ICD-10-CM

## 2024-01-05 RX ORDER — DOXAZOSIN 2 MG/1
2 TABLET ORAL DAILY
Status: CANCELLED | OUTPATIENT
Start: 2024-01-05

## 2024-01-05 RX ORDER — DOXAZOSIN 2 MG/1
2 TABLET ORAL DAILY
Qty: 90 TABLET | Refills: 3 | Status: SHIPPED | OUTPATIENT
Start: 2024-01-05

## 2024-01-05 RX ORDER — BLOOD-GLUCOSE SENSOR
1 EACH MISCELLANEOUS
Qty: 3 EACH | Refills: 11 | Status: SHIPPED | OUTPATIENT
Start: 2024-01-05 | End: 2025-01-04

## 2024-01-05 NOTE — TELEPHONE ENCOUNTER
Faxed script to [email or web address].      ----- Message from Ara Mendes sent at 2024 10:41 AM CST -----  Contact: Wife, Carolyn Bermudez  MRN: [id number]  : 1945  PCP: Angus Ervin  Home Phone      [phone]  Work Phone      Not on file.  Mobile          [phone]      MESSAGE: Patient needs a new prescription for his Dexcom G7 sent to [email or web address]     FAX; [phone]/ PHONE; [phone]    PHONE; [phone]

## 2024-02-08 RX ORDER — RAMIPRIL 10 MG/1
10 CAPSULE ORAL DAILY
Qty: 90 CAPSULE | Refills: 3 | Status: SHIPPED | OUTPATIENT
Start: 2024-02-08

## 2024-03-07 ENCOUNTER — TELEPHONE (OUTPATIENT)
Dept: ENDOCRINOLOGY | Facility: CLINIC | Age: 79
End: 2024-03-07
Payer: MEDICARE

## 2024-04-01 ENCOUNTER — OFFICE VISIT (OUTPATIENT)
Dept: ENDOCRINOLOGY | Facility: CLINIC | Age: 79
End: 2024-04-01
Payer: MEDICARE

## 2024-04-01 VITALS
HEART RATE: 66 BPM | DIASTOLIC BLOOD PRESSURE: 80 MMHG | BODY MASS INDEX: 38.36 KG/M2 | HEIGHT: 71 IN | WEIGHT: 274 LBS | SYSTOLIC BLOOD PRESSURE: 154 MMHG

## 2024-04-01 DIAGNOSIS — R79.89 ELEVATED TSH: ICD-10-CM

## 2024-04-01 DIAGNOSIS — E78.2 MIXED HYPERLIPIDEMIA: ICD-10-CM

## 2024-04-01 DIAGNOSIS — E11.22 TYPE 2 DIABETES MELLITUS WITH STAGE 3A CHRONIC KIDNEY DISEASE, WITH LONG-TERM CURRENT USE OF INSULIN: Primary | ICD-10-CM

## 2024-04-01 DIAGNOSIS — N18.31 TYPE 2 DIABETES MELLITUS WITH STAGE 3A CHRONIC KIDNEY DISEASE, WITH LONG-TERM CURRENT USE OF INSULIN: Primary | ICD-10-CM

## 2024-04-01 DIAGNOSIS — I48.0 PAROXYSMAL ATRIAL FIBRILLATION: ICD-10-CM

## 2024-04-01 DIAGNOSIS — Z79.4 TYPE 2 DIABETES MELLITUS WITH STAGE 3A CHRONIC KIDNEY DISEASE, WITH LONG-TERM CURRENT USE OF INSULIN: Primary | ICD-10-CM

## 2024-04-01 PROCEDURE — 99214 OFFICE O/P EST MOD 30 MIN: CPT | Mod: S$GLB,,, | Performed by: STUDENT IN AN ORGANIZED HEALTH CARE EDUCATION/TRAINING PROGRAM

## 2024-04-01 PROCEDURE — 99999 PR PBB SHADOW E&M-EST. PATIENT-LVL III: CPT | Mod: PBBFAC,,, | Performed by: STUDENT IN AN ORGANIZED HEALTH CARE EDUCATION/TRAINING PROGRAM

## 2024-04-01 PROCEDURE — 95251 CONT GLUC MNTR ANALYSIS I&R: CPT | Mod: S$GLB,,, | Performed by: STUDENT IN AN ORGANIZED HEALTH CARE EDUCATION/TRAINING PROGRAM

## 2024-04-01 NOTE — ASSESSMENT & PLAN NOTE
Glucose controlled with average glucose 140s and TIR > 90%. Minimal hyperglycemia. A1c is 5.8% which is at goal. He is using a SSI Novolog only with minimal requirements.    Continue same regimen.     Plan  - Continue Novolog SSI 4 times daily  - Continue Dexcom G7    F/u 6 months

## 2024-04-01 NOTE — PROGRESS NOTES
Subjective:      Patient ID: Adan Bermudez is a 78 y.o. male.    Chief Complaint:  Type 2 diabetes mellitus    History of Present Illness  This is a 78 y.o. male. with a past medical history of type 2 diabetes mellitus, HTN, Afib here for follow up    Type 2 diabetes mellitus    Current diabetes medications:  - Novolog SSI 4 times daily    INSULIN CORRECTION SCALE    Glucose                 Insulin           181-200               +2 units                     201-250               +4 units                      251-300               +6 units                     301-350               +8 units                      >350                    +10 units                       Past diabetes medications:  - Metformin    Lab Results   Component Value Date    CREATININE 1.4 07/21/2023    EGFRNORACEVR 52 (A) 07/21/2023       Known diabetic complications: nephropathy    Weight:  Wt Readings from Last 6 Encounters:   04/01/24 124.3 kg (274 lb)   09/27/23 118.4 kg (261 lb)   07/26/23 113.2 kg (249 lb 9.6 oz)   07/21/23 116 kg (255 lb 11.7 oz)   04/04/23 113.1 kg (249 lb 5.4 oz)   04/03/23 113.1 kg (249 lb 5.4 oz)         Family history: Mother side    Blood glucose monitoring at home:       Diabetes Management Status  Statin: Taking  ACE/ARB: Taking    Diabetes Management Status  Statin: Taking  ACE/ARB: Taking    Screening or Prevention Patient's value   HgA1C Testing and Control   Lab Results   Component Value Date    HGBA1C 5.8 (H) 07/20/2023        LDL control Lab Results   Component Value Date    LDLCALC 53.2 (L) 07/20/2023      Nephropathy screening Lab Results   Component Value Date    MICALBCREAT 8.7 07/20/2023            Outside labs  6/1/22  A1c 7.2%    3/26/24  A1c 5.8%  TSH 4.5  LDL 39, HDL 48, Tg 114  Cr 1.2        Lab Results   Component Value Date    TSH 7.149 (H) 07/20/2023         Review of Systems  As above    Social and family history reviewed  Current medications and allergies reviewed    Objective:   BP (!)  "154/80 (BP Location: Left arm, Patient Position: Sitting, BP Method: Medium (Manual))   Pulse 66   Ht 5' 11" (1.803 m)   Wt 124.3 kg (274 lb)   BMI 38.22 kg/m²   Physical Exam  Alert, oriented    BP Readings from Last 1 Encounters:   04/01/24 (!) 154/80      Wt Readings from Last 1 Encounters:   04/01/24 0908 124.3 kg (274 lb)     Body mass index is 38.22 kg/m².    Lab Review:   Lab Results   Component Value Date    HGBA1C 5.8 (H) 07/20/2023     Lab Results   Component Value Date    CHOL 115 (L) 07/20/2023    HDL 47 07/20/2023    LDLCALC 53.2 (L) 07/20/2023    TRIG 74 07/20/2023    CHOLHDL 40.9 07/20/2023     Lab Results   Component Value Date     07/21/2023    K 3.9 07/21/2023     07/21/2023    CO2 24 07/21/2023    GLU 96 07/21/2023    BUN 24 (H) 07/21/2023    CREATININE 1.4 07/21/2023    CALCIUM 8.6 (L) 07/21/2023    PROT 6.7 07/21/2023    ALBUMIN 3.3 (L) 07/21/2023    BILITOT 0.6 07/21/2023    ALKPHOS 73 07/21/2023    AST 15 07/21/2023    ALT 13 07/21/2023    ANIONGAP 10 07/21/2023    TSH 7.149 (H) 07/20/2023       All pertinent labs reviewed    Assessment and Plan     Type 2 diabetes mellitus with stage 3a chronic kidney disease, with long-term current use of insulin  Glucose controlled with average glucose 140s and TIR > 90%. Minimal hyperglycemia. A1c is 5.8% which is at goal. He is using a SSI Novolog only with minimal requirements.    Continue same regimen.     Plan  - Continue Novolog SSI 4 times daily  - Continue Dexcom G7    F/u 6 months      Elevated TSH  Prior TSH 7, now 4.5 which is at goal for age and history of AFib    Paroxysmal atrial fibrillation  Amiodarone discontinued Nov 2022 by Cardiology  Keep TSH ~ 3-8    HLD (hyperlipidemia)  Continue statin  Monitored by Cardiology          Jose Wilcox MD  Endocrinology        "

## 2024-06-19 RX ORDER — ROSUVASTATIN CALCIUM 20 MG/1
20 TABLET, COATED ORAL
Qty: 90 TABLET | Refills: 0 | Status: SHIPPED | OUTPATIENT
Start: 2024-06-19

## 2024-07-17 ENCOUNTER — OFFICE VISIT (OUTPATIENT)
Dept: CARDIOLOGY | Facility: CLINIC | Age: 79
End: 2024-07-17
Payer: MEDICARE

## 2024-07-17 VITALS
SYSTOLIC BLOOD PRESSURE: 136 MMHG | BODY MASS INDEX: 38.27 KG/M2 | OXYGEN SATURATION: 97 % | WEIGHT: 273.38 LBS | DIASTOLIC BLOOD PRESSURE: 80 MMHG | RESPIRATION RATE: 18 BRPM | HEART RATE: 66 BPM | HEIGHT: 71 IN

## 2024-07-17 DIAGNOSIS — N18.31 TYPE 2 DIABETES MELLITUS WITH STAGE 3A CHRONIC KIDNEY DISEASE, WITH LONG-TERM CURRENT USE OF INSULIN: ICD-10-CM

## 2024-07-17 DIAGNOSIS — I10 ESSENTIAL HYPERTENSION: ICD-10-CM

## 2024-07-17 DIAGNOSIS — E11.22 TYPE 2 DIABETES MELLITUS WITH STAGE 3A CHRONIC KIDNEY DISEASE, WITH LONG-TERM CURRENT USE OF INSULIN: ICD-10-CM

## 2024-07-17 DIAGNOSIS — Z79.4 TYPE 2 DIABETES MELLITUS WITH STAGE 3A CHRONIC KIDNEY DISEASE, WITH LONG-TERM CURRENT USE OF INSULIN: ICD-10-CM

## 2024-07-17 DIAGNOSIS — I48.0 PAROXYSMAL ATRIAL FIBRILLATION: Primary | ICD-10-CM

## 2024-07-17 DIAGNOSIS — I10 PRIMARY HYPERTENSION: ICD-10-CM

## 2024-07-17 DIAGNOSIS — E78.2 MIXED HYPERLIPIDEMIA: ICD-10-CM

## 2024-07-17 DIAGNOSIS — E66.01 CLASS 2 SEVERE OBESITY DUE TO EXCESS CALORIES WITH SERIOUS COMORBIDITY AND BODY MASS INDEX (BMI) OF 35.0 TO 35.9 IN ADULT: ICD-10-CM

## 2024-07-17 PROCEDURE — 99999 PR PBB SHADOW E&M-EST. PATIENT-LVL III: CPT | Mod: PBBFAC,,, | Performed by: NURSE PRACTITIONER

## 2024-07-17 PROCEDURE — 1159F MED LIST DOCD IN RCRD: CPT | Mod: CPTII,S$GLB,, | Performed by: NURSE PRACTITIONER

## 2024-07-17 PROCEDURE — 3079F DIAST BP 80-89 MM HG: CPT | Mod: CPTII,S$GLB,, | Performed by: NURSE PRACTITIONER

## 2024-07-17 PROCEDURE — 99214 OFFICE O/P EST MOD 30 MIN: CPT | Mod: S$GLB,,, | Performed by: NURSE PRACTITIONER

## 2024-07-17 PROCEDURE — 3075F SYST BP GE 130 - 139MM HG: CPT | Mod: CPTII,S$GLB,, | Performed by: NURSE PRACTITIONER

## 2024-07-17 PROCEDURE — 1126F AMNT PAIN NOTED NONE PRSNT: CPT | Mod: CPTII,S$GLB,, | Performed by: NURSE PRACTITIONER

## 2024-07-17 RX ORDER — ROSUVASTATIN CALCIUM 20 MG/1
20 TABLET, COATED ORAL DAILY
Qty: 90 TABLET | Refills: 0 | Status: SHIPPED | OUTPATIENT
Start: 2024-07-17

## 2024-07-17 RX ORDER — TORSEMIDE 20 MG/1
20 TABLET ORAL DAILY
Qty: 90 TABLET | Refills: 3 | Status: SHIPPED | OUTPATIENT
Start: 2024-07-17

## 2024-07-17 RX ORDER — AMLODIPINE BESYLATE 5 MG/1
5 TABLET ORAL DAILY
Qty: 90 TABLET | Refills: 4 | Status: SHIPPED | OUTPATIENT
Start: 2024-07-17

## 2024-07-17 RX ORDER — DOXAZOSIN 1 MG/1
1 TABLET ORAL DAILY
Qty: 90 TABLET | Refills: 3 | Status: SHIPPED | OUTPATIENT
Start: 2024-07-17

## 2024-07-17 RX ORDER — RAMIPRIL 10 MG/1
10 CAPSULE ORAL DAILY
Qty: 90 CAPSULE | Refills: 3 | Status: SHIPPED | OUTPATIENT
Start: 2024-07-17

## 2024-07-17 RX ORDER — NEBIVOLOL 5 MG/1
1 TABLET ORAL DAILY
COMMUNITY
End: 2024-07-17 | Stop reason: ALTCHOICE

## 2024-07-17 NOTE — PROGRESS NOTES
Cardiology Clinic note    Subjective:   Patient ID:  Adan Bermudez is a 79 y.o. male who presents for follow-up of Atrial Fibrillation, Hypertension, Hyperlipidemia, Coronary Artery Disease, and Shortness of Breath       HPI:   Adan Bermudez  has a past medical history of HLD (hyperlipidemia) and Venous insufficiency of both lower extremities.    Patient here for follow-up, last seen July 2023  Is doing well from a cardiac perspective but reports systolic blood pressure to be as low as in the 90s occasionally  No recent falls    Recent labs reviewed and looks good       PAF; on apixaban.  Per chart review amiodarone discontinued due to bradycardia.  Pulse 66 today    Hyperlipidemia; controlled on cholesterol     Hypertension; stable with amlodipine 5, clonidine 0.1 mg, doxazosin 2 mg, torsemide 20 mg, Altace 10 mg.  Reports some systolic blood pressure in the 90s     CAD; Calcium score 400, negative Lexiscan 2019  No angina or complaints of SOB/COLVIN      Patient Active Problem List    Diagnosis Date Noted    Elevated TSH 03/27/2023    Coronary artery disease involving native coronary artery of native heart without angina pectoris 11/19/2022    Essential hypertension 10/17/2022    Type 2 diabetes mellitus with stage 3a chronic kidney disease, with long-term current use of insulin 10/17/2022    Stage 3a chronic kidney disease 10/17/2022    Class 2 obesity in adult 10/17/2022    Cervical spondylosis 09/16/2022    Dizziness 06/07/2022    Imbalance 06/07/2022    Paroxysmal atrial fibrillation 06/07/2022    HLD (hyperlipidemia) 06/07/2022    Recurrent falls 06/07/2022    Chronic low back pain 06/07/2022       Patient's Medications   New Prescriptions    No medications on file   Previous Medications    AMLODIPINE (NORVASC) 5 MG TABLET    Take 1 tablet (5 mg total) by mouth once daily.    APIXABAN (ELIQUIS) 5 MG TAB    Take 1 tablet (5 mg total) by mouth 2 (two) times daily.    BLOOD-GLUCOSE SENSOR (DEXCOM G7  SENSOR) MAURY    1 Device by Misc.(Non-Drug; Combo Route) route every 10 days.    CLONIDINE (CATAPRES) 0.1 MG TABLET    Take 1 tablet (0.1 mg total) by mouth 2 (two) times daily.    DOXAZOSIN (CARDURA) 2 MG TABLET    Take 1 tablet (2 mg total) by mouth once daily.    EPINEPHRINE (EPIPEN) 0.3 MG/0.3 ML ATIN    INJECT AS DIRECTED    MECLIZINE (ANTIVERT) 25 MG TABLET    Take 25 mg by mouth 3 (three) times daily as needed.    NEBIVOLOL (BYSTOLIC) 5 MG TAB    Take 1 tablet by mouth once daily.    RAMIPRIL (ALTACE) 10 MG CAPSULE    Take 1 capsule (10 mg total) by mouth once daily.    ROSUVASTATIN (CRESTOR) 20 MG TABLET    Take 1 tablet by mouth once daily    TORSEMIDE (DEMADEX) 20 MG TAB    Take 20 mg by mouth once daily.   Modified Medications    No medications on file   Discontinued Medications    No medications on file        Review of Systems   Constitutional: Negative for chills, decreased appetite, diaphoresis, fever, malaise/fatigue, night sweats, weight gain and weight loss.   HENT:  Negative for congestion, ear discharge, ear pain, hearing loss, hoarse voice, nosebleeds, odynophagia, sore throat, stridor and tinnitus.    Eyes:  Negative for blurred vision, discharge, double vision, pain, photophobia, redness, vision loss in left eye, vision loss in right eye, visual disturbance and visual halos.   Cardiovascular:  Positive for dyspnea on exertion and leg swelling. Negative for chest pain, claudication, cyanosis, irregular heartbeat, near-syncope, orthopnea, palpitations, paroxysmal nocturnal dyspnea and syncope.   Respiratory:  Positive for shortness of breath. Negative for cough, hemoptysis, sleep disturbances due to breathing, snoring, sputum production and wheezing.    Endocrine: Negative for cold intolerance, heat intolerance, polydipsia, polyphagia and polyuria.   Hematologic/Lymphatic: Negative for adenopathy and bleeding problem. Does not bruise/bleed easily.   Skin:  Negative for color change, dry skin,  "flushing, itching, nail changes, poor wound healing, rash, skin cancer, suspicious lesions and unusual hair distribution.   Musculoskeletal:  Positive for back pain. Negative for arthritis, falls, gout, joint pain, joint swelling, muscle cramps, muscle weakness, myalgias, neck pain and stiffness.   Gastrointestinal:  Negative for bloating, abdominal pain, anorexia, change in bowel habit, bowel incontinence, constipation, diarrhea, dysphagia, excessive appetite, flatus, heartburn, hematemesis, hematochezia, hemorrhoids, jaundice, melena, nausea and vomiting.   Genitourinary:  Negative for bladder incontinence, decreased libido, dysuria, flank pain, frequency, genital sores, hematuria, hesitancy, incomplete emptying, nocturia and urgency.   Neurological:  Positive for loss of balance. Negative for aphonia, brief paralysis, difficulty with concentration, disturbances in coordination, excessive daytime sleepiness, dizziness, focal weakness, headaches, light-headedness, numbness, paresthesias, seizures, sensory change, tremors, vertigo and weakness.   Psychiatric/Behavioral:  Negative for altered mental status, depression, hallucinations, memory loss, substance abuse, suicidal ideas and thoughts of violence. The patient does not have insomnia and is not nervous/anxious.    Allergic/Immunologic: Negative for hives and persistent infections.         Objective:   Vitals  Vitals:    07/17/24 1507   BP: 136/80   Pulse: 66   Resp: 18   SpO2: 97%   Weight: 124 kg (273 lb 5.9 oz)   Height: 5' 11" (1.803 m)          Physical Exam  Constitutional:       General: He is not in acute distress.     Appearance: He is well-developed. He is not diaphoretic.   HENT:      Head: Normocephalic and atraumatic.      Nose: Nose normal.   Eyes:      General: No scleral icterus.        Right eye: No discharge.      Conjunctiva/sclera: Conjunctivae normal.      Pupils: Pupils are equal, round, and reactive to light.   Neck:      Thyroid: No " thyromegaly.      Vascular: No JVD.      Trachea: No tracheal deviation.   Cardiovascular:      Rate and Rhythm: Normal rate and regular rhythm.      Pulses:           Carotid pulses are 2+ on the right side and 2+ on the left side.       Radial pulses are 2+ on the right side and 2+ on the left side.        Dorsalis pedis pulses are 1+ on the right side and 1+ on the left side.        Posterior tibial pulses are 1+ on the right side and 1+ on the left side.      Heart sounds: Normal heart sounds. No murmur heard.     No friction rub. No gallop.   Pulmonary:      Effort: Pulmonary effort is normal. No respiratory distress.      Breath sounds: Normal breath sounds. No stridor. No wheezing or rales.   Chest:      Chest wall: No tenderness.   Abdominal:      General: Bowel sounds are normal. There is no distension.      Palpations: Abdomen is soft. There is no mass.      Tenderness: There is no abdominal tenderness. There is no guarding or rebound.   Musculoskeletal:         General: No tenderness. Normal range of motion.      Cervical back: Normal range of motion and neck supple.      Comments: Trace edema of the ankles   Lymphadenopathy:      Cervical: No cervical adenopathy.   Skin:     General: Skin is warm and dry.      Coloration: Skin is not pale.      Findings: No erythema or rash.   Neurological:      Mental Status: He is alert and oriented to person, place, and time.      Cranial Nerves: No cranial nerve deficit.      Coordination: Coordination normal.   Psychiatric:         Behavior: Behavior normal.         Thought Content: Thought content normal.         Judgment: Judgment normal.           Lab Results    Lab Results   Component Value Date    WBC 5.46 07/21/2023    HGB 13.4 (L) 07/21/2023    HCT 41.5 07/21/2023    MCV 86 07/21/2023       Lab Results   Component Value Date     (L) 07/21/2023       Lab Results   Component Value Date    K 3.9 07/21/2023    BUN 24 (H) 07/21/2023    CREATININE 1.4  07/21/2023       Lab Results   Component Value Date    GLU 96 07/21/2023    HGBA1C 5.8 (H) 07/20/2023       Lab Results   Component Value Date    AST 15 07/21/2023    ALT 13 07/21/2023    ALBUMIN 3.3 (L) 07/21/2023    PROT 6.7 07/21/2023       Lab Results   Component Value Date    CHOL 115 (L) 07/20/2023    HDL 47 07/20/2023    LDLCALC 53.2 (L) 07/20/2023    TRIG 74 07/20/2023         Assessment:     Problem List Items Addressed This Visit    None      Plan:     Patient doing well from a cardiac decrease doxazosin to 1 mg, if blood pressure remains low with complaints of weakness he may stop  Otherwise, continue with current medical plan and lifestyle changes.    Follow up in 6 months with primary cardiologist  Return sooner for concerns or questions. If symptoms persist go to the ED    He expressed verbal understanding and agreed with the plan    Thank you for the opportunity to care for this patient. Will be available for questions if needed.     Total duration of face to face visit time 30 minutes.  Total time spent counseling greater than fifty percent of total visit time.  Counseling included discussion regarding imaging findings, diagnosis, possibilities, treatment options, risks and benefits.    CRISTOBAL Purcell-DEJA  Cardiology Clinic  [facility]

## 2024-09-23 RX ORDER — ROSUVASTATIN CALCIUM 20 MG/1
20 TABLET, COATED ORAL DAILY
Qty: 90 TABLET | Refills: 3 | Status: SHIPPED | OUTPATIENT
Start: 2024-09-23

## 2024-11-01 ENCOUNTER — TELEPHONE (OUTPATIENT)
Dept: ENDOCRINOLOGY | Facility: CLINIC | Age: 79
End: 2024-11-01

## 2024-11-01 ENCOUNTER — OFFICE VISIT (OUTPATIENT)
Dept: ENDOCRINOLOGY | Facility: CLINIC | Age: 79
End: 2024-11-01
Payer: MEDICARE

## 2024-11-01 VITALS
BODY MASS INDEX: 38.41 KG/M2 | SYSTOLIC BLOOD PRESSURE: 147 MMHG | HEIGHT: 71 IN | RESPIRATION RATE: 17 BRPM | WEIGHT: 274.38 LBS | DIASTOLIC BLOOD PRESSURE: 98 MMHG | HEART RATE: 83 BPM

## 2024-11-01 DIAGNOSIS — E11.22 TYPE 2 DIABETES MELLITUS WITH STAGE 3A CHRONIC KIDNEY DISEASE, WITH LONG-TERM CURRENT USE OF INSULIN: Primary | ICD-10-CM

## 2024-11-01 DIAGNOSIS — N18.31 TYPE 2 DIABETES MELLITUS WITH STAGE 3A CHRONIC KIDNEY DISEASE, WITH LONG-TERM CURRENT USE OF INSULIN: Primary | ICD-10-CM

## 2024-11-01 DIAGNOSIS — Z79.4 TYPE 2 DIABETES MELLITUS WITH STAGE 3A CHRONIC KIDNEY DISEASE, WITH LONG-TERM CURRENT USE OF INSULIN: Primary | ICD-10-CM

## 2024-11-01 DIAGNOSIS — N18.31 STAGE 3A CHRONIC KIDNEY DISEASE: ICD-10-CM

## 2024-11-01 DIAGNOSIS — I10 ESSENTIAL HYPERTENSION: ICD-10-CM

## 2024-11-01 PROCEDURE — 99999 PR PBB SHADOW E&M-EST. PATIENT-LVL III: CPT | Mod: PBBFAC,,, | Performed by: STUDENT IN AN ORGANIZED HEALTH CARE EDUCATION/TRAINING PROGRAM

## 2024-11-13 ENCOUNTER — OFFICE VISIT (OUTPATIENT)
Dept: CARDIOLOGY | Facility: CLINIC | Age: 79
End: 2024-11-13
Payer: MEDICARE

## 2024-11-13 VITALS
BODY MASS INDEX: 38.27 KG/M2 | WEIGHT: 273.38 LBS | OXYGEN SATURATION: 98 % | SYSTOLIC BLOOD PRESSURE: 127 MMHG | HEIGHT: 71 IN | RESPIRATION RATE: 18 BRPM | DIASTOLIC BLOOD PRESSURE: 86 MMHG | HEART RATE: 79 BPM

## 2024-11-13 DIAGNOSIS — Z79.4 TYPE 2 DIABETES MELLITUS WITH STAGE 3A CHRONIC KIDNEY DISEASE, WITH LONG-TERM CURRENT USE OF INSULIN: ICD-10-CM

## 2024-11-13 DIAGNOSIS — E11.22 TYPE 2 DIABETES MELLITUS WITH STAGE 3A CHRONIC KIDNEY DISEASE, WITH LONG-TERM CURRENT USE OF INSULIN: ICD-10-CM

## 2024-11-13 DIAGNOSIS — I50.33 ACUTE ON CHRONIC DIASTOLIC HEART FAILURE: Primary | ICD-10-CM

## 2024-11-13 DIAGNOSIS — E78.5 HYPERLIPIDEMIA, UNSPECIFIED HYPERLIPIDEMIA TYPE: ICD-10-CM

## 2024-11-13 DIAGNOSIS — I10 ESSENTIAL HYPERTENSION: ICD-10-CM

## 2024-11-13 DIAGNOSIS — I25.10 CORONARY ARTERY DISEASE INVOLVING NATIVE CORONARY ARTERY OF NATIVE HEART WITHOUT ANGINA PECTORIS: ICD-10-CM

## 2024-11-13 DIAGNOSIS — E66.812 CLASS 2 SEVERE OBESITY DUE TO EXCESS CALORIES WITH SERIOUS COMORBIDITY AND BODY MASS INDEX (BMI) OF 35.0 TO 35.9 IN ADULT: ICD-10-CM

## 2024-11-13 DIAGNOSIS — N18.31 TYPE 2 DIABETES MELLITUS WITH STAGE 3A CHRONIC KIDNEY DISEASE, WITH LONG-TERM CURRENT USE OF INSULIN: ICD-10-CM

## 2024-11-13 DIAGNOSIS — N18.31 STAGE 3A CHRONIC KIDNEY DISEASE: ICD-10-CM

## 2024-11-13 DIAGNOSIS — I48.0 PAROXYSMAL ATRIAL FIBRILLATION: ICD-10-CM

## 2024-11-13 DIAGNOSIS — E66.01 CLASS 2 SEVERE OBESITY DUE TO EXCESS CALORIES WITH SERIOUS COMORBIDITY AND BODY MASS INDEX (BMI) OF 35.0 TO 35.9 IN ADULT: ICD-10-CM

## 2024-11-13 DIAGNOSIS — I50.31 ACUTE DIASTOLIC HEART FAILURE: ICD-10-CM

## 2024-11-13 PROCEDURE — 99999 PR PBB SHADOW E&M-EST. PATIENT-LVL III: CPT | Mod: PBBFAC,,, | Performed by: INTERNAL MEDICINE

## 2024-11-13 NOTE — PROGRESS NOTES
[facility] Cardiology     Subjective:    Patient ID:  Adan Bermudez is a 79 y.o. male who presents for follow-up of Shortness of Breath, Congestive Heart Failure, Atrial Fibrillation, Coronary Artery Disease, Diabetes Mellitus, Hyperlipidemia, and Hypertension    Review of patient's allergies indicates:   Allergen Reactions    Bee sting [allergen ext-venom-honey bee] Shortness Of Breath    Venom-wasp Anaphylaxis     He is here complaining of worsening shortness of breath with activity.  He likes to ride his bicycle for exercise.  He states that his breathing impacts activity tolerance and he has to stop.  He is on Demadex 20 mg per day.  On exam he is still in sinus rhythm.  He takes Eliquis.  He had AFib in the past.  I stopped amiodarone over a year ago and he has not had any recurrence.  On last visit his Cardura was dropped from 2 mg to 1 mg.  Today's blood pressure 127/86.  He brings in blood pressure readings that I have reviewed that mostly looked good.  All his heart rates are in the 70s.    He states he urinates a lot when he takes his Demadex.  He has not had orthopnea.  He is a lifelong nonsmoker.  He is diabetic.  LDL 53 mg% with Crestor 20 mg.  There is history of elevated calcium score.  He has never had angina.         Review of Systems   Constitutional: Negative for chills, decreased appetite, diaphoresis, fever, malaise/fatigue, night sweats, weight gain and weight loss.   HENT:  Negative for congestion, ear discharge, ear pain, hearing loss, hoarse voice, nosebleeds, odynophagia, sore throat, stridor and tinnitus.    Eyes:  Negative for blurred vision, discharge, double vision, pain, photophobia, redness, vision loss in left eye, vision loss in right eye, visual disturbance and visual halos.   Cardiovascular:  Positive for dyspnea on exertion. Negative for chest pain, claudication, cyanosis, irregular heartbeat, leg  swelling, near-syncope, orthopnea, palpitations, paroxysmal nocturnal dyspnea and syncope.   Respiratory:  Positive for shortness of breath. Negative for cough, hemoptysis, sleep disturbances due to breathing, snoring, sputum production and wheezing.    Endocrine: Negative for cold intolerance, heat intolerance, polydipsia, polyphagia and polyuria.   Hematologic/Lymphatic: Negative for adenopathy and bleeding problem. Does not bruise/bleed easily.   Skin:  Negative for color change, dry skin, flushing, itching, nail changes, poor wound healing, rash, skin cancer, suspicious lesions and unusual hair distribution.   Musculoskeletal:  Negative for arthritis, back pain, falls, gout, joint pain, joint swelling, muscle cramps, muscle weakness, myalgias, neck pain and stiffness.   Gastrointestinal:  Negative for bloating, abdominal pain, anorexia, change in bowel habit, bowel incontinence, constipation, diarrhea, dysphagia, excessive appetite, flatus, heartburn, hematemesis, hematochezia, hemorrhoids, jaundice, melena, nausea and vomiting.   Genitourinary:  Negative for bladder incontinence, decreased libido, dysuria, flank pain, frequency, genital sores, hematuria, hesitancy, incomplete emptying, nocturia and urgency.   Neurological:  Negative for aphonia, brief paralysis, difficulty with concentration, disturbances in coordination, excessive daytime sleepiness, dizziness, focal weakness, headaches, light-headedness, loss of balance, numbness, paresthesias, seizures, sensory change, tremors, vertigo and weakness.   Psychiatric/Behavioral:  Negative for altered mental status, depression, hallucinations, memory loss, substance abuse, suicidal ideas and thoughts of violence. The patient does not have insomnia and is not nervous/anxious.    Allergic/Immunologic: Negative for hives and persistent infections.        Objective:       Vitals:    11/13/24 0843   BP: 127/86   Patient Position: Sitting   Pulse: 79   Resp: 18   SpO2:  "98%   Weight: 124 kg (273 lb 5.9 oz)   Height: 5' 11" (1.803 m)    Physical Exam  Constitutional:       General: He is not in acute distress.     Appearance: He is well-developed. He is not diaphoretic.   HENT:      Head: Normocephalic and atraumatic.      Nose: Nose normal.   Eyes:      General: No scleral icterus.        Right eye: No discharge.      Conjunctiva/sclera: Conjunctivae normal.      Pupils: Pupils are equal, round, and reactive to light.   Neck:      Thyroid: No thyromegaly.      Vascular: No JVD.      Trachea: No tracheal deviation.   Cardiovascular:      Rate and Rhythm: Normal rate and regular rhythm.      Pulses:           Carotid pulses are 2+ on the right side and 2+ on the left side.       Radial pulses are 2+ on the right side and 2+ on the left side.        Dorsalis pedis pulses are 1+ on the right side and 1+ on the left side.        Posterior tibial pulses are 1+ on the right side and 1+ on the left side.      Heart sounds: Normal heart sounds. No murmur heard.     No friction rub. No gallop.   Pulmonary:      Effort: Pulmonary effort is normal. No respiratory distress.      Breath sounds: Normal breath sounds. No stridor. No wheezing or rales.   Chest:      Chest wall: No tenderness.   Abdominal:      General: Bowel sounds are normal. There is no distension.      Palpations: Abdomen is soft. There is no mass.      Tenderness: There is no abdominal tenderness. There is no guarding or rebound.   Musculoskeletal:         General: No tenderness. Normal range of motion.      Cervical back: Normal range of motion and neck supple.      Comments: Trace edema of the ankles   Lymphadenopathy:      Cervical: No cervical adenopathy.   Skin:     General: Skin is warm and dry.      Coloration: Skin is not pale.      Findings: No erythema or rash.   Neurological:      Mental Status: He is alert and oriented to person, place, and time.      Cranial Nerves: No cranial nerve deficit.      Coordination: " Coordination normal.   Psychiatric:         Behavior: Behavior normal.         Thought Content: Thought content normal.         Judgment: Judgment normal.           Assessment:       1. Acute diastolic heart failure    2. Paroxysmal atrial fibrillation    3. Acute on chronic diastolic heart failure    4. Coronary artery disease involving native coronary artery of native heart without angina pectoris    5. Essential hypertension    6. Hyperlipidemia, unspecified hyperlipidemia type    7. Stage 3a chronic kidney disease    8. Class 2 severe obesity due to excess calories with serious comorbidity and body mass index (BMI) of 35.0 to 35.9 in adult    9. Type 2 diabetes mellitus with stage 3a chronic kidney disease, with long-term current use of insulin      Results for orders placed or performed during the hospital encounter of 07/21/23   Comprehensive Metabolic Panel (CMP)    Collection Time: 07/21/23  8:10 AM   Result Value Ref Range    Sodium 139 136 - 145 mmol/L    Potassium 3.9 3.5 - 5.1 mmol/L    Chloride 105 95 - 110 mmol/L    CO2 24 23 - 29 mmol/L    Glucose 96 70 - 110 mg/dL    BUN 24 (H) 8 - 23 mg/dL    Creatinine 1.4 0.5 - 1.4 mg/dL    Calcium 8.6 (L) 8.7 - 10.5 mg/dL    Total Protein 6.7 6.0 - 8.4 g/dL    Albumin 3.3 (L) 3.5 - 5.2 g/dL    Total Bilirubin 0.6 0.1 - 1.0 mg/dL    Alkaline Phosphatase 73 55 - 135 U/L    AST 15 10 - 40 U/L    ALT 13 10 - 44 U/L    eGFR 52 (A) >60 mL/min/1.73 m^2    Anion Gap 10 8 - 16 mmol/L   Complete Blood Count (CBC)    Collection Time: 07/21/23  8:11 AM   Result Value Ref Range    WBC 5.46 3.90 - 12.70 K/uL    RBC 4.82 4.60 - 6.20 M/uL    Hemoglobin 13.4 (L) 14.0 - 18.0 g/dL    Hematocrit 41.5 40.0 - 54.0 %    MCV 86 82 - 98 fL    MCH 27.8 27.0 - 31.0 pg    MCHC 32.3 32.0 - 36.0 g/dL    RDW 14.1 11.5 - 14.5 %    Platelets 144 (L) 150 - 450 K/uL    MPV 11.2 9.2 - 12.9 fL    Immature Granulocytes 0.4 0.0 - 0.5 %    Gran # (ANC) 3.2 1.8 - 7.7 K/uL    Immature Grans (Abs) 0.02  0.00 - 0.04 K/uL    Lymph # 1.5 1.0 - 4.8 K/uL    Mono # 0.7 0.3 - 1.0 K/uL    Eos # 0.0 0.0 - 0.5 K/uL    Baso # 0.05 0.00 - 0.20 K/uL    nRBC 0 0 /100 WBC    Gran % 59.2 38.0 - 73.0 %    Lymph % 26.9 18.0 - 48.0 %    Mono % 12.1 4.0 - 15.0 %    Eosinophil % 0.5 0.0 - 8.0 %    Basophil % 0.9 0.0 - 1.9 %    Differential Method Automated          Current Outpatient Medications:     amLODIPine (NORVASC) 5 MG tablet, Take 1 tablet (5 mg total) by mouth once daily., Disp: 90 tablet, Rfl: 4    blood-glucose sensor (DEXCOM G7 SENSOR) Julia, 1 Device by Misc.(Non-Drug; Combo Route) route every 10 days., Disp: 3 each, Rfl: 11    doxazosin (CARDURA) 1 MG tablet, Take 1 tablet (1 mg total) by mouth once daily., Disp: 90 tablet, Rfl: 3    EPINEPHrine (EPIPEN) 0.3 mg/0.3 mL AtIn, INJECT AS DIRECTED, Disp: , Rfl:     ramipriL (ALTACE) 10 MG capsule, Take 1 capsule (10 mg total) by mouth once daily., Disp: 90 capsule, Rfl: 3    rosuvastatin (CRESTOR) 20 MG tablet, Take 1 tablet (20 mg total) by mouth once daily., Disp: 90 tablet, Rfl: 3    torsemide (DEMADEX) 20 MG Tab, Take 1 tablet (20 mg total) by mouth once daily., Disp: 90 tablet, Rfl: 3    apixaban (ELIQUIS) 5 mg Tab, Take 1 tablet (5 mg total) by mouth 2 (two) times daily., Disp: 180 tablet, Rfl: 3    empagliflozin (JARDIANCE) 10 mg tablet, Take 1 tablet (10 mg total) by mouth once daily., Disp: 90 tablet, Rfl: 3     Lab Results   Component Value Date    WBC 5.46 07/21/2023    RBC 4.82 07/21/2023    HGB 13.4 (L) 07/21/2023    HCT 41.5 07/21/2023    MCV 86 07/21/2023    MCH 27.8 07/21/2023    MCHC 32.3 07/21/2023    RDW 14.1 07/21/2023     (L) 07/21/2023    MPV 11.2 07/21/2023    GRAN 3.2 07/21/2023    GRAN 59.2 07/21/2023    LYMPH 1.5 07/21/2023    LYMPH 26.9 07/21/2023    MONO 0.7 07/21/2023    MONO 12.1 07/21/2023    EOS 0.0 07/21/2023    BASO 0.05 07/21/2023    EOSINOPHIL 0.5 07/21/2023    BASOPHIL 0.9 07/21/2023        CMP  Lab Results   Component Value Date     " 07/21/2023    K 3.9 07/21/2023     07/21/2023    CO2 24 07/21/2023    GLU 96 07/21/2023    BUN 24 (H) 07/21/2023    CREATININE 1.4 07/21/2023    CALCIUM 8.6 (L) 07/21/2023    PROT 6.7 07/21/2023    ALBUMIN 3.3 (L) 07/21/2023    BILITOT 0.6 07/21/2023    ALKPHOS 73 07/21/2023    AST 15 07/21/2023    ALT 13 07/21/2023    ANIONGAP 10 07/21/2023        No results found for: "LABBLOO", "LABURIN", "RESPIRATORYC", "GSRESP"         Results for orders placed or performed during the hospital encounter of 07/21/23   EKG 12-lead    Collection Time: 07/21/23  7:53 AM    Narrative    Test Reason : I10    Vent. Rate : 065 BPM     Atrial Rate : 065 BPM     P-R Int : 266 ms          QRS Dur : 106 ms      QT Int : 480 ms       P-R-T Axes : 065 000 054 degrees     QTc Int : 499 ms    Sinus rhythm with 1st degree A-V block  Nonspecific T wave abnormality  Prolonged QT  Abnormal ECG  When compared with ECG of 18-NOV-2022 10:37,  No significant change was found  Confirmed by Jeff RETANA, John GALLOWAY ([id number]) on 7/24/2023 7:58:44 AM    Referred By: VETO   SELF           Confirmed By:John Aguilar MD                   Plan:       Problem List Items Addressed This Visit          Cardiac/Vascular    Paroxysmal atrial fibrillation     Remains sinus rhythm.  Withdrawal of amiodarone November 20, 2022.           Relevant Medications    apixaban (ELIQUIS) 5 mg Tab    HLD (hyperlipidemia)     Crestor 20 mg well tolerated.  LDL well controlled.  Condition controlled.           Essential hypertension     Altace 10 mg Cardura 1 mg to continue.  He is also on amlodipine 5 mg.  No low blood pressures reported.  Condition controlled.           Coronary artery disease involving native coronary artery of native heart without angina pectoris     Reported calcium score greater than 400.  No symptoms of angina, he does have dyspnea.  Negative stress test 2019.            Acute on chronic diastolic heart failure     Exercise tolerance is " declining.  Dyspnea is reported.  Jardiance recommended for worsening heart failure.  He remains in sinus rhythm.    Demadex 20 mg daily will be continued.  Side effects related to Jardiance discussed including increased frequency of urination.            Renal/    Stage 3a chronic kidney disease     Condition unchanged.              Endocrine    Type 2 diabetes mellitus with stage 3a chronic kidney disease, with long-term current use of insulin     Jardiance ordered for heart failure.  I will notify Endocrine.  He takes insulin.  No changes made by Endocrine on last visit.           Class 2 obesity in adult     Weight loss encouraged.            Other Visit Diagnoses       Acute diastolic heart failure    -  Primary    Relevant Medications    empagliflozin (JARDIANCE) 10 mg tablet               Jardiance 10 mg ordered for heart failure.  Benefits of treatment discussed.  Side effects also discussed.    A 3 month follow-up was recommended.             John Aguilar MD  11/15/2024   9:34 AM

## 2024-11-15 PROBLEM — I50.33 ACUTE ON CHRONIC DIASTOLIC HEART FAILURE: Status: ACTIVE | Noted: 2024-11-15

## 2024-11-15 NOTE — ASSESSMENT & PLAN NOTE
Reported calcium score greater than 400.  No symptoms of angina, he does have dyspnea.  Negative stress test 2019.

## 2024-11-15 NOTE — ASSESSMENT & PLAN NOTE
Altace 10 mg Cardura 1 mg to continue.  He is also on amlodipine 5 mg.  No low blood pressures reported.  Condition controlled.

## 2024-11-15 NOTE — ASSESSMENT & PLAN NOTE
Exercise tolerance is declining.  Dyspnea is reported.  Jardiance recommended for worsening heart failure.  He remains in sinus rhythm.    Demadex 20 mg daily will be continued.  Side effects related to Jardiance discussed including increased frequency of urination.

## 2024-11-15 NOTE — ASSESSMENT & PLAN NOTE
Jardiance ordered for heart failure.  I will notify Endocrine.  He takes insulin.  No changes made by Endocrine on last visit.

## 2025-02-04 RX ORDER — RAMIPRIL 10 MG/1
10 CAPSULE ORAL DAILY
Qty: 90 CAPSULE | Refills: 3 | Status: SHIPPED | OUTPATIENT
Start: 2025-02-04

## 2025-02-12 ENCOUNTER — OFFICE VISIT (OUTPATIENT)
Dept: CARDIOLOGY | Facility: CLINIC | Age: 80
End: 2025-02-12
Payer: MEDICARE

## 2025-02-12 VITALS
WEIGHT: 245.81 LBS | RESPIRATION RATE: 16 BRPM | BODY MASS INDEX: 34.41 KG/M2 | SYSTOLIC BLOOD PRESSURE: 116 MMHG | DIASTOLIC BLOOD PRESSURE: 82 MMHG | HEART RATE: 96 BPM | OXYGEN SATURATION: 93 % | HEIGHT: 71 IN

## 2025-02-12 DIAGNOSIS — I10 ESSENTIAL HYPERTENSION: ICD-10-CM

## 2025-02-12 DIAGNOSIS — E11.22 TYPE 2 DIABETES MELLITUS WITH STAGE 3A CHRONIC KIDNEY DISEASE, WITH LONG-TERM CURRENT USE OF INSULIN: ICD-10-CM

## 2025-02-12 DIAGNOSIS — Z79.4 TYPE 2 DIABETES MELLITUS WITH STAGE 3A CHRONIC KIDNEY DISEASE, WITH LONG-TERM CURRENT USE OF INSULIN: ICD-10-CM

## 2025-02-12 DIAGNOSIS — I48.0 PAROXYSMAL ATRIAL FIBRILLATION: Primary | ICD-10-CM

## 2025-02-12 DIAGNOSIS — I25.10 CORONARY ARTERY DISEASE INVOLVING NATIVE CORONARY ARTERY OF NATIVE HEART WITHOUT ANGINA PECTORIS: ICD-10-CM

## 2025-02-12 DIAGNOSIS — I50.33 ACUTE ON CHRONIC DIASTOLIC HEART FAILURE: ICD-10-CM

## 2025-02-12 DIAGNOSIS — N18.31 TYPE 2 DIABETES MELLITUS WITH STAGE 3A CHRONIC KIDNEY DISEASE, WITH LONG-TERM CURRENT USE OF INSULIN: ICD-10-CM

## 2025-02-12 DIAGNOSIS — E66.812 CLASS 2 SEVERE OBESITY DUE TO EXCESS CALORIES WITH SERIOUS COMORBIDITY AND BODY MASS INDEX (BMI) OF 35.0 TO 35.9 IN ADULT: ICD-10-CM

## 2025-02-12 DIAGNOSIS — E78.5 HYPERLIPIDEMIA, UNSPECIFIED HYPERLIPIDEMIA TYPE: ICD-10-CM

## 2025-02-12 DIAGNOSIS — E66.01 CLASS 2 SEVERE OBESITY DUE TO EXCESS CALORIES WITH SERIOUS COMORBIDITY AND BODY MASS INDEX (BMI) OF 35.0 TO 35.9 IN ADULT: ICD-10-CM

## 2025-02-12 PROCEDURE — 1159F MED LIST DOCD IN RCRD: CPT | Mod: CPTII,S$GLB,, | Performed by: INTERNAL MEDICINE

## 2025-02-12 PROCEDURE — 1126F AMNT PAIN NOTED NONE PRSNT: CPT | Mod: CPTII,S$GLB,, | Performed by: INTERNAL MEDICINE

## 2025-02-12 PROCEDURE — 3074F SYST BP LT 130 MM HG: CPT | Mod: CPTII,S$GLB,, | Performed by: INTERNAL MEDICINE

## 2025-02-12 PROCEDURE — 99999 PR PBB SHADOW E&M-EST. PATIENT-LVL III: CPT | Mod: PBBFAC,,, | Performed by: INTERNAL MEDICINE

## 2025-02-12 PROCEDURE — 3079F DIAST BP 80-89 MM HG: CPT | Mod: CPTII,S$GLB,, | Performed by: INTERNAL MEDICINE

## 2025-02-12 PROCEDURE — 93000 ELECTROCARDIOGRAM COMPLETE: CPT | Mod: S$GLB,,, | Performed by: INTERNAL MEDICINE

## 2025-02-12 PROCEDURE — 99214 OFFICE O/P EST MOD 30 MIN: CPT | Mod: 25,S$GLB,, | Performed by: INTERNAL MEDICINE

## 2025-02-12 PROCEDURE — 1160F RVW MEDS BY RX/DR IN RCRD: CPT | Mod: CPTII,S$GLB,, | Performed by: INTERNAL MEDICINE

## 2025-02-12 RX ORDER — METOPROLOL SUCCINATE 25 MG/1
25 TABLET, EXTENDED RELEASE ORAL DAILY
Qty: 90 TABLET | Refills: 3 | Status: SHIPPED | OUTPATIENT
Start: 2025-02-12 | End: 2026-02-12

## 2025-02-12 NOTE — ASSESSMENT & PLAN NOTE
Withdrawal of amiodarone was 2022.  Today I document AFib for the 1st time since amiodarone withdrawal.    Toprol ordered, resting heart rate by Electrocardiogram 84 beats per minute.

## 2025-02-12 NOTE — ASSESSMENT & PLAN NOTE
Calcium score greater than 400.  Asymptomatic status.  Negative stress test 2019.  He is active.  His Electrocardiogram today does not show ischemic changes or infarct pattern.

## 2025-02-12 NOTE — ASSESSMENT & PLAN NOTE
His readings are well controlled.  Today I stopped Cardura and amlodipine.  Toprol 25 mg ordered he will continue Altace.    He monitors at home and will send additional readings.

## 2025-02-12 NOTE — PROGRESS NOTES
[facility] Cardiology     Subjective:    Patient ID:  Adan Bermudez is a 79 y.o. male who presents for follow-up of Congestive Heart Failure (Follow up), Coronary Artery Disease, Atrial Fibrillation, Hypertension, and Hyperlipidemia    Review of patient's allergies indicates:   Allergen Reactions    Bee sting [allergen ext-venom-honey bee] Shortness Of Breath    Venom-wasp Anaphylaxis     He has history of paroxysmal AFib.  When I met him he was on amiodarone.  He had bradycardia, symptomatic.  He had developed AFib after a bee sting.  He was on amiodarone for 2 years and remains sinus rhythm.  I stopped amiodarone in 2022.    Today he is in AFib.  He does not feel it.  He states he has been feeling great.  Blood pressure readings have been checked.  He states that he always had difficult to control blood pressure but his readings are now low or normal.  He has never been symptomatic in regards to his low blood pressures but I see some readings below 100 systolic.    I started Jardiance for heart failure.  He was advised to stop his Demadex related to the low blood pressures.  He states he has been feeling great.  He has normal exercise tolerance.  He confirms that Jardiance improved his breathing.    He has a calcium score greater than 400.  Negative stress test 2019.  He has never had angina.  His GFR is in the 50 range.    Today's electrocardiogram reviewed and independently interpreted by myself confirms heart rate 84 atrial fibrillation.  Single PVC present.  There is reduced voltage in the lateral precordial leads.  No definitive infarct pattern noted.           Review of Systems   Constitutional: Negative for chills, decreased appetite, diaphoresis, fever, malaise/fatigue, night sweats, weight gain and weight loss.   HENT:  Negative for congestion, ear discharge, ear pain, hearing loss, hoarse voice, nosebleeds, odynophagia, sore  throat, stridor and tinnitus.    Eyes:  Negative for blurred vision, discharge, double vision, pain, photophobia, redness, vision loss in left eye, vision loss in right eye, visual disturbance and visual halos.   Cardiovascular:  Negative for chest pain, claudication, cyanosis, dyspnea on exertion, irregular heartbeat, leg swelling, near-syncope, orthopnea, palpitations, paroxysmal nocturnal dyspnea and syncope.   Respiratory:  Negative for cough, hemoptysis, shortness of breath, sleep disturbances due to breathing, snoring, sputum production and wheezing.    Endocrine: Negative for cold intolerance, heat intolerance, polydipsia, polyphagia and polyuria.   Hematologic/Lymphatic: Negative for adenopathy and bleeding problem. Does not bruise/bleed easily.   Skin:  Negative for color change, dry skin, flushing, itching, nail changes, poor wound healing, rash, skin cancer, suspicious lesions and unusual hair distribution.   Musculoskeletal:  Positive for back pain. Negative for arthritis, falls, gout, joint pain, joint swelling, muscle cramps, muscle weakness, myalgias, neck pain and stiffness.   Gastrointestinal:  Negative for bloating, abdominal pain, anorexia, change in bowel habit, bowel incontinence, constipation, diarrhea, dysphagia, excessive appetite, flatus, heartburn, hematemesis, hematochezia, hemorrhoids, jaundice, melena, nausea and vomiting.   Genitourinary:  Negative for bladder incontinence, decreased libido, dysuria, flank pain, frequency, genital sores, hematuria, hesitancy, incomplete emptying, nocturia and urgency.   Neurological:  Negative for aphonia, brief paralysis, difficulty with concentration, disturbances in coordination, excessive daytime sleepiness, dizziness, focal weakness, headaches, light-headedness, loss of balance, numbness, paresthesias, seizures, sensory change, tremors, vertigo and weakness.   Psychiatric/Behavioral:  Negative for altered mental status, depression, hallucinations,  "memory loss, substance abuse, suicidal ideas and thoughts of violence. The patient does not have insomnia and is not nervous/anxious.    Allergic/Immunologic: Negative for hives and persistent infections.        Objective:       Vitals:    02/12/25 0914   BP: 116/82   BP Location: Left arm   Patient Position: Sitting   Pulse: 96   Resp: 16   SpO2: (!) 93%   Weight: 111.5 kg (245 lb 13 oz)   Height: 5' 11" (1.803 m)    Physical Exam  Constitutional:       General: He is not in acute distress.     Appearance: He is well-developed. He is not diaphoretic.   HENT:      Head: Normocephalic and atraumatic.      Nose: Nose normal.   Eyes:      General: No scleral icterus.        Right eye: No discharge.      Conjunctiva/sclera: Conjunctivae normal.      Pupils: Pupils are equal, round, and reactive to light.   Neck:      Thyroid: No thyromegaly.      Vascular: No JVD.      Trachea: No tracheal deviation.   Cardiovascular:      Rate and Rhythm: Normal rate. Rhythm irregularly irregular.      Pulses:           Carotid pulses are 2+ on the right side and 2+ on the left side.       Radial pulses are 2+ on the right side and 2+ on the left side.        Dorsalis pedis pulses are 1+ on the right side and 1+ on the left side.        Posterior tibial pulses are 1+ on the right side and 1+ on the left side.      Heart sounds: Normal heart sounds. No murmur heard.     No friction rub. No gallop.   Pulmonary:      Effort: Pulmonary effort is normal. No respiratory distress.      Breath sounds: Normal breath sounds. No stridor. No wheezing or rales.   Chest:      Chest wall: No tenderness.   Abdominal:      General: Bowel sounds are normal. There is no distension.      Palpations: Abdomen is soft. There is no mass.      Tenderness: There is no abdominal tenderness. There is no guarding or rebound.   Musculoskeletal:         General: No tenderness. Normal range of motion.      Cervical back: Normal range of motion and neck supple.      " Comments: Trace edema of the ankles   Lymphadenopathy:      Cervical: No cervical adenopathy.   Skin:     General: Skin is warm and dry.      Coloration: Skin is not pale.      Findings: No erythema or rash.   Neurological:      Mental Status: He is alert and oriented to person, place, and time.      Cranial Nerves: No cranial nerve deficit.      Coordination: Coordination normal.   Psychiatric:         Behavior: Behavior normal.         Thought Content: Thought content normal.         Judgment: Judgment normal.           Assessment:       1. Paroxysmal atrial fibrillation    2. Class 2 severe obesity due to excess calories with serious comorbidity and body mass index (BMI) of 35.0 to 35.9 in adult    3. Essential hypertension    4. Acute on chronic diastolic heart failure    5. Coronary artery disease involving native coronary artery of native heart without angina pectoris    6. Hyperlipidemia, unspecified hyperlipidemia type    7. Type 2 diabetes mellitus with stage 3a chronic kidney disease, with long-term current use of insulin      Results for orders placed or performed during the hospital encounter of 07/21/23   Comprehensive Metabolic Panel (CMP)    Collection Time: 07/21/23  8:10 AM   Result Value Ref Range    Sodium 139 136 - 145 mmol/L    Potassium 3.9 3.5 - 5.1 mmol/L    Chloride 105 95 - 110 mmol/L    CO2 24 23 - 29 mmol/L    Glucose 96 70 - 110 mg/dL    BUN 24 (H) 8 - 23 mg/dL    Creatinine 1.4 0.5 - 1.4 mg/dL    Calcium 8.6 (L) 8.7 - 10.5 mg/dL    Total Protein 6.7 6.0 - 8.4 g/dL    Albumin 3.3 (L) 3.5 - 5.2 g/dL    Total Bilirubin 0.6 0.1 - 1.0 mg/dL    Alkaline Phosphatase 73 55 - 135 U/L    AST 15 10 - 40 U/L    ALT 13 10 - 44 U/L    eGFR 52 (A) >60 mL/min/1.73 m^2    Anion Gap 10 8 - 16 mmol/L   Complete Blood Count (CBC)    Collection Time: 07/21/23  8:11 AM   Result Value Ref Range    WBC 5.46 3.90 - 12.70 K/uL    RBC 4.82 4.60 - 6.20 M/uL    Hemoglobin 13.4 (L) 14.0 - 18.0 g/dL    Hematocrit  41.5 40.0 - 54.0 %    MCV 86 82 - 98 fL    MCH 27.8 27.0 - 31.0 pg    MCHC 32.3 32.0 - 36.0 g/dL    RDW 14.1 11.5 - 14.5 %    Platelets 144 (L) 150 - 450 K/uL    MPV 11.2 9.2 - 12.9 fL    Immature Granulocytes 0.4 0.0 - 0.5 %    Gran # (ANC) 3.2 1.8 - 7.7 K/uL    Immature Grans (Abs) 0.02 0.00 - 0.04 K/uL    Lymph # 1.5 1.0 - 4.8 K/uL    Mono # 0.7 0.3 - 1.0 K/uL    Eos # 0.0 0.0 - 0.5 K/uL    Baso # 0.05 0.00 - 0.20 K/uL    nRBC 0 0 /100 WBC    Gran % 59.2 38.0 - 73.0 %    Lymph % 26.9 18.0 - 48.0 %    Mono % 12.1 4.0 - 15.0 %    Eosinophil % 0.5 0.0 - 8.0 %    Basophil % 0.9 0.0 - 1.9 %    Differential Method Automated          Current Outpatient Medications:     apixaban (ELIQUIS) 5 mg Tab, Take 1 tablet (5 mg total) by mouth 2 (two) times daily., Disp: 180 tablet, Rfl: 3    blood-glucose sensor (DEXCOM G7 SENSOR) Julia, 1 Device by Misc.(Non-Drug; Combo Route) route every 10 days., Disp: 3 each, Rfl: 11    empagliflozin (JARDIANCE) 10 mg tablet, Take 1 tablet (10 mg total) by mouth once daily., Disp: 90 tablet, Rfl: 3    ramipriL (ALTACE) 10 MG capsule, Take 1 capsule (10 mg total) by mouth once daily., Disp: 90 capsule, Rfl: 3    rosuvastatin (CRESTOR) 20 MG tablet, Take 1 tablet (20 mg total) by mouth once daily., Disp: 90 tablet, Rfl: 3    metoprolol succinate (TOPROL-XL) 25 MG 24 hr tablet, Take 1 tablet (25 mg total) by mouth once daily., Disp: 90 tablet, Rfl: 3     Lab Results   Component Value Date    WBC 5.46 07/21/2023    RBC 4.82 07/21/2023    HGB 13.4 (L) 07/21/2023    HCT 41.5 07/21/2023    MCV 86 07/21/2023    MCH 27.8 07/21/2023    MCHC 32.3 07/21/2023    RDW 14.1 07/21/2023     (L) 07/21/2023    MPV 11.2 07/21/2023    GRAN 3.2 07/21/2023    GRAN 59.2 07/21/2023    LYMPH 1.5 07/21/2023    LYMPH 26.9 07/21/2023    MONO 0.7 07/21/2023    MONO 12.1 07/21/2023    EOS 0.0 07/21/2023    BASO 0.05 07/21/2023    EOSINOPHIL 0.5 07/21/2023    BASOPHIL 0.9 07/21/2023        CMP  Lab Results   Component  "Value Date     07/21/2023    K 3.9 07/21/2023     07/21/2023    CO2 24 07/21/2023    GLU 96 07/21/2023    BUN 24 (H) 07/21/2023    CREATININE 1.4 07/21/2023    CALCIUM 8.6 (L) 07/21/2023    PROT 6.7 07/21/2023    ALBUMIN 3.3 (L) 07/21/2023    BILITOT 0.6 07/21/2023    ALKPHOS 73 07/21/2023    AST 15 07/21/2023    ALT 13 07/21/2023    ANIONGAP 10 07/21/2023        No results found for: "LABBLOO", "LABURIN", "RESPIRATORYC", "GSRESP"         Results for orders placed or performed during the hospital encounter of 07/21/23   EKG 12-lead    Collection Time: 07/21/23  7:53 AM    Narrative    Test Reason : I10    Vent. Rate : 065 BPM     Atrial Rate : 065 BPM     P-R Int : 266 ms          QRS Dur : 106 ms      QT Int : 480 ms       P-R-T Axes : 065 000 054 degrees     QTc Int : 499 ms    Sinus rhythm with 1st degree A-V block  Nonspecific T wave abnormality  Prolonged QT  Abnormal ECG  When compared with ECG of 18-NOV-2022 10:37,  No significant change was found  Confirmed by Jeff RETANA, John GALLOWAY ([id number]) on 7/24/2023 7:58:44 AM    Referred By: [email or web address]   SELF           Confirmed By:John Aguilar MD                   Plan:       Problem List Items Addressed This Visit          Cardiac/Vascular    Paroxysmal atrial fibrillation - Primary     Withdrawal of amiodarone was 2022.  Today I document AFib for the 1st time since amiodarone withdrawal.    Toprol ordered, resting heart rate by Electrocardiogram 84 beats per minute.         Relevant Medications    metoprolol succinate (TOPROL-XL) 25 MG 24 hr tablet    Other Relevant Orders    IN OFFICE EKG 12-LEAD (to [facility])    HLD (hyperlipidemia)     Rosuvastatin tolerated well and will be continued.  Most recent LDL 53 mg% by labs 2023 July.         Essential hypertension     His readings are well controlled.  Today I stopped Cardura and amlodipine.  Toprol 25 mg ordered he will continue Altace.    He monitors at home and will send additional readings.         Relevant " Medications    metoprolol succinate (TOPROL-XL) 25 MG 24 hr tablet    Coronary artery disease involving native coronary artery of native heart without angina pectoris     Calcium score greater than 400.  Asymptomatic status.  Negative stress test 2019.  He is active.  His Electrocardiogram today does not show ischemic changes or infarct pattern.           Acute on chronic diastolic heart failure     Demadex withdrawn.  Jardiance response has been excellent.  It will be continued.    Condition stable.            Endocrine    Type 2 diabetes mellitus with stage 3a chronic kidney disease, with long-term current use of insulin     Jardiance tolerated well.  Last A1c 5.8.         Class 2 severe obesity due to excess calories with serious comorbidity and body mass index (BMI) of 35.0 to 35.9 in adult          Toprol 25 mg ordered today.  He is in atrial fibrillation.  He is asymptomatic.  I stopped amlodipine and Cardura.    He will continue to monitor his blood pressures.  His diastolic heart failure symptomatology has improved.    For now I will attempt anticoagulation and rate control.  I base my decision based on how well he feels despite atrial fibrillation.                 John Aguilar MD  02/12/2025   9:28 AM

## 2025-02-12 NOTE — ASSESSMENT & PLAN NOTE
Demadex withdrawn.  Jardiance response has been excellent.  It will be continued.    Condition stable.

## 2025-02-13 LAB
OHS QRS DURATION: 82 MS
OHS QTC CALCULATION: 449 MS

## 2025-04-16 ENCOUNTER — OFFICE VISIT (OUTPATIENT)
Dept: CARDIOLOGY | Facility: CLINIC | Age: 80
End: 2025-04-16
Payer: MEDICARE

## 2025-04-16 VITALS
HEIGHT: 71 IN | OXYGEN SATURATION: 98 % | BODY MASS INDEX: 33.18 KG/M2 | RESPIRATION RATE: 18 BRPM | WEIGHT: 237 LBS | SYSTOLIC BLOOD PRESSURE: 134 MMHG | DIASTOLIC BLOOD PRESSURE: 77 MMHG | HEART RATE: 74 BPM

## 2025-04-16 DIAGNOSIS — Z79.4 TYPE 2 DIABETES MELLITUS WITH STAGE 3A CHRONIC KIDNEY DISEASE, WITH LONG-TERM CURRENT USE OF INSULIN: ICD-10-CM

## 2025-04-16 DIAGNOSIS — I48.0 PAROXYSMAL ATRIAL FIBRILLATION: ICD-10-CM

## 2025-04-16 DIAGNOSIS — I48.20 CHRONIC ATRIAL FIBRILLATION: Primary | ICD-10-CM

## 2025-04-16 DIAGNOSIS — N18.31 STAGE 3A CHRONIC KIDNEY DISEASE: ICD-10-CM

## 2025-04-16 DIAGNOSIS — N18.31 TYPE 2 DIABETES MELLITUS WITH STAGE 3A CHRONIC KIDNEY DISEASE, WITH LONG-TERM CURRENT USE OF INSULIN: ICD-10-CM

## 2025-04-16 DIAGNOSIS — E66.812 CLASS 2 SEVERE OBESITY DUE TO EXCESS CALORIES WITH SERIOUS COMORBIDITY AND BODY MASS INDEX (BMI) OF 35.0 TO 35.9 IN ADULT: ICD-10-CM

## 2025-04-16 DIAGNOSIS — E11.22 TYPE 2 DIABETES MELLITUS WITH STAGE 3A CHRONIC KIDNEY DISEASE, WITH LONG-TERM CURRENT USE OF INSULIN: ICD-10-CM

## 2025-04-16 DIAGNOSIS — E78.5 HYPERLIPIDEMIA, UNSPECIFIED HYPERLIPIDEMIA TYPE: ICD-10-CM

## 2025-04-16 DIAGNOSIS — I25.10 CORONARY ARTERY DISEASE INVOLVING NATIVE CORONARY ARTERY OF NATIVE HEART WITHOUT ANGINA PECTORIS: ICD-10-CM

## 2025-04-16 DIAGNOSIS — I50.32 CHRONIC DIASTOLIC HEART FAILURE: ICD-10-CM

## 2025-04-16 DIAGNOSIS — G47.30 SLEEP APNEA, UNSPECIFIED TYPE: ICD-10-CM

## 2025-04-16 DIAGNOSIS — E66.01 CLASS 2 SEVERE OBESITY DUE TO EXCESS CALORIES WITH SERIOUS COMORBIDITY AND BODY MASS INDEX (BMI) OF 35.0 TO 35.9 IN ADULT: ICD-10-CM

## 2025-04-16 DIAGNOSIS — I10 ESSENTIAL HYPERTENSION: ICD-10-CM

## 2025-04-16 LAB
OHS QRS DURATION: 94 MS
OHS QTC CALCULATION: 474 MS

## 2025-04-16 PROCEDURE — 99999 PR PBB SHADOW E&M-EST. PATIENT-LVL III: CPT | Mod: PBBFAC,,, | Performed by: INTERNAL MEDICINE

## 2025-04-16 RX ORDER — TORSEMIDE 20 MG/1
20 TABLET ORAL DAILY
Qty: 90 TABLET | Refills: 3 | Status: SHIPPED | OUTPATIENT
Start: 2025-04-16 | End: 2026-04-16

## 2025-04-16 NOTE — ASSESSMENT & PLAN NOTE
Asymptomatic status.  He is on Toprol.  Condition stable.  Calcium score greater than 400 in the past.

## 2025-04-16 NOTE — PROGRESS NOTES
[facility] Cardiology     Subjective:    Patient ID:  Adan Bermudez is a 80 y.o. male who presents for follow-up of Atrial Fibrillation, Congestive Heart Failure, Diabetes Mellitus, Hypertension, Hyperlipidemia, and Coronary Artery Disease    Review of patient's allergies indicates:   Allergen Reactions    Bee sting [allergen ext-venom-honey bee] Shortness Of Breath    Venom-wasp Anaphylaxis     I asked him to come in because of med changes on last visit.  He continues to feel good in regards to exercise capacity.  He did have to resume his furosemide 20 mg dosing due to leg swelling.  He is on Jardiance.  His ejection fraction is normal.  Last echo 2022.    Amiodarone was withdrawn in the past due to bradycardia.  He subsequently had recurrent AFib earlier this year.  On last visit Toprol started.  He also takes ramipril for hypertension.  His heart rates are stable.  He does not have shortness of breath.    He had a calcium score greater than 400 in the past.  Last stress test 2019.    He has lost significant weight.  He feels like his breathing has improved related to weight loss.  He is a nonsmoker.  He is on Eliquis.    Last LDL 53 mg% utilizing rosuvastatin 20 mg.    Today's repeat electrocardiogram reviewed and independently interpreted by myself confirms AFib heart rate 82.  ST segments show minimal abnormality.  Limb lead reversal evident.         Review of Systems   Constitutional: Negative for chills, decreased appetite, diaphoresis, fever, malaise/fatigue, night sweats, weight gain and weight loss.   HENT:  Negative for congestion, ear discharge, ear pain, hearing loss, hoarse voice, nosebleeds, odynophagia, sore throat, stridor and tinnitus.    Eyes:  Negative for blurred vision, discharge, double vision, pain, photophobia, redness, vision loss in left eye, vision loss in right eye, visual disturbance and visual halos.    Cardiovascular:  Positive for leg swelling. Negative for chest pain, claudication, cyanosis, dyspnea on exertion, irregular heartbeat, near-syncope, orthopnea, palpitations, paroxysmal nocturnal dyspnea and syncope.   Respiratory:  Negative for cough, hemoptysis, shortness of breath, sleep disturbances due to breathing, snoring, sputum production and wheezing.    Endocrine: Negative for cold intolerance, heat intolerance, polydipsia, polyphagia and polyuria.   Hematologic/Lymphatic: Negative for adenopathy and bleeding problem. Does not bruise/bleed easily.   Skin:  Negative for color change, dry skin, flushing, itching, nail changes, poor wound healing, rash, skin cancer, suspicious lesions and unusual hair distribution.   Musculoskeletal:  Positive for back pain. Negative for arthritis, falls, gout, joint pain, joint swelling, muscle cramps, muscle weakness, myalgias, neck pain and stiffness.   Gastrointestinal:  Negative for bloating, abdominal pain, anorexia, change in bowel habit, bowel incontinence, constipation, diarrhea, dysphagia, excessive appetite, flatus, heartburn, hematemesis, hematochezia, hemorrhoids, jaundice, melena, nausea and vomiting.   Genitourinary:  Negative for bladder incontinence, decreased libido, dysuria, flank pain, frequency, genital sores, hematuria, hesitancy, incomplete emptying, nocturia and urgency.   Neurological:  Negative for aphonia, brief paralysis, difficulty with concentration, disturbances in coordination, excessive daytime sleepiness, dizziness, focal weakness, headaches, light-headedness, loss of balance, numbness, paresthesias, seizures, sensory change, tremors, vertigo and weakness.   Psychiatric/Behavioral:  Negative for altered mental status, depression, hallucinations, memory loss, substance abuse, suicidal ideas and thoughts of violence. The patient does not have insomnia and is not nervous/anxious.    Allergic/Immunologic: Negative for hives and persistent  "infections.        Objective:       Vitals:    04/16/25 0959   BP: 134/77   Patient Position: Sitting   Pulse: 74   Resp: 18   SpO2: 98%   Weight: 107.5 kg (236 lb 15.9 oz)   Height: 5' 11" (1.803 m)    Physical Exam  Constitutional:       General: He is not in acute distress.     Appearance: He is well-developed. He is not diaphoretic.   HENT:      Head: Normocephalic and atraumatic.      Nose: Nose normal.   Eyes:      General: No scleral icterus.        Right eye: No discharge.      Conjunctiva/sclera: Conjunctivae normal.      Pupils: Pupils are equal, round, and reactive to light.   Neck:      Thyroid: No thyromegaly.      Vascular: No JVD.      Trachea: No tracheal deviation.   Cardiovascular:      Rate and Rhythm: Normal rate. Rhythm irregularly irregular.      Pulses:           Carotid pulses are 2+ on the right side and 2+ on the left side.       Radial pulses are 2+ on the right side and 2+ on the left side.        Dorsalis pedis pulses are 1+ on the right side and 1+ on the left side.        Posterior tibial pulses are 1+ on the right side and 1+ on the left side.      Heart sounds: Normal heart sounds. No murmur heard.     No friction rub. No gallop.   Pulmonary:      Effort: Pulmonary effort is normal. No respiratory distress.      Breath sounds: Normal breath sounds. No stridor. No wheezing or rales.   Chest:      Chest wall: No tenderness.   Abdominal:      General: Bowel sounds are normal. There is no distension.      Palpations: Abdomen is soft. There is no mass.      Tenderness: There is no abdominal tenderness. There is no guarding or rebound.   Musculoskeletal:         General: No tenderness. Normal range of motion.      Cervical back: Normal range of motion and neck supple.      Comments: Trace edema of the ankles   Lymphadenopathy:      Cervical: No cervical adenopathy.   Skin:     General: Skin is warm and dry.      Coloration: Skin is not pale.      Findings: No erythema or rash. "   Neurological:      Mental Status: He is alert and oriented to person, place, and time.      Cranial Nerves: No cranial nerve deficit.      Coordination: Coordination normal.   Psychiatric:         Behavior: Behavior normal.         Thought Content: Thought content normal.         Judgment: Judgment normal.           Assessment:       1. Chronic atrial fibrillation    2. Paroxysmal atrial fibrillation    3. Stage 3a chronic kidney disease    4. Coronary artery disease involving native coronary artery of native heart without angina pectoris    5. Hyperlipidemia, unspecified hyperlipidemia type    6. Chronic diastolic heart failure    7. Essential hypertension    8. Type 2 diabetes mellitus with stage 3a chronic kidney disease, with long-term current use of insulin    9. Sleep apnea, unspecified type    10. Class 2 severe obesity due to excess calories with serious comorbidity and body mass index (BMI) of 35.0 to 35.9 in adult      Results for orders placed or performed in visit on 02/12/25   IN OFFICE EKG 12-LEAD (to [place])    Collection Time: 02/12/25  9:32 AM   Result Value Ref Range    QRS Duration 82 ms    [facility] QTC Calculation 449 ms       Current Medications[1]     Lab Results   Component Value Date    WBC 5.46 07/21/2023    RBC 4.82 07/21/2023    HGB 13.4 (L) 07/21/2023    HCT 41.5 07/21/2023    MCV 86 07/21/2023    MCH 27.8 07/21/2023    MCHC 32.3 07/21/2023    RDW 14.1 07/21/2023     (L) 07/21/2023    MPV 11.2 07/21/2023    GRAN 3.2 07/21/2023    GRAN 59.2 07/21/2023    LYMPH 1.5 07/21/2023    LYMPH 26.9 07/21/2023    MONO 0.7 07/21/2023    MONO 12.1 07/21/2023    EOS 0.0 07/21/2023    BASO 0.05 07/21/2023    EOSINOPHIL 0.5 07/21/2023    BASOPHIL 0.9 07/21/2023        CMP  Lab Results   Component Value Date     07/21/2023    K 3.9 07/21/2023     07/21/2023    CO2 24 07/21/2023    GLU 96 07/21/2023    BUN 24 (H) 07/21/2023    CREATININE 1.4 07/21/2023    CALCIUM 8.6 (L) 07/21/2023    PROT 6.7  "07/21/2023    ALBUMIN 3.3 (L) 07/21/2023    BILITOT 0.6 07/21/2023    ALKPHOS 73 07/21/2023    AST 15 07/21/2023    ALT 13 07/21/2023    ANIONGAP 10 07/21/2023        No results found for: "LABBLOO", "LABURIN", "RESPIRATORYC", "GSRESP"         Results for orders placed or performed in visit on 02/12/25   IN OFFICE EKG 12-LEAD (to [place])    Collection Time: 02/12/25  9:32 AM   Result Value Ref Range    QRS Duration 82 ms    [facility] QTC Calculation 449 ms    Narrative    Test Reason : I48.0,    Vent. Rate :  84 BPM     Atrial Rate :    BPM     P-R Int :    ms          QRS Dur :  82 ms      QT Int : 380 ms       P-R-T Axes :     -7  24 degrees    QTcB Int : 449 ms    Technically poor ECG tracing  Atrial fibrillation with premature ventricular or aberrantly conducted  complexes  Abnormal ECG  When compared with ECG of 21-Jul-2023 07:53,  Atrial fibrillation has replaced Sinus rhythm  Nonspecific T wave abnormality now evident in Inferior leads  Confirmed by John Aguilar ([id number]) on 2/13/2025 11:49:39 AM    Referred By:            Confirmed By: John Aguilar                   Plan:       Problem List Items Addressed This Visit          Cardiac/Vascular    Chronic atrial fibrillation - Primary    AFib tolerated well.  No plans for cardioversion or EP assessment at this time.  He is on low-dose Toprol.  He takes Eliquis.         HLD (hyperlipidemia)    LDL well controlled.  Crestor 20 mg to continue.         Essential hypertension    His blood pressures have been stable.  No changes made.         Coronary artery disease involving native coronary artery of native heart without angina pectoris    Asymptomatic status.  He is on Toprol.  Condition stable.  Calcium score greater than 400 in the past.         Chronic diastolic heart failure    His current regimen of therapy is working well.  No shortness of breath reported.            Renal/    Stage 3a chronic kidney disease    Relevant Medications    torsemide (DEMADEX) 20 MG Tab "       Endocrine    Type 2 diabetes mellitus with stage 3a chronic kidney disease, with long-term current use of insulin    Condition stable.  Jardiance to continue.  Last hemoglobin A1c 5.8.         Class 2 obesity in adult    Continued weight loss encouraged.            Other    Sleep apnea    He is compliant with CPAP.               I recommended a six-month follow-up.  Demadex dosing resumed recently and will be continued daily.    He is doing well overall.  No other changes in therapy recommended at this time.             John Aguilar MD  04/16/2025   10:20 AM                 [1]   Current Outpatient Medications:     apixaban (ELIQUIS) 5 mg Tab, Take 1 tablet (5 mg total) by mouth 2 (two) times daily., Disp: 180 tablet, Rfl: 3    empagliflozin (JARDIANCE) 10 mg tablet, Take 1 tablet (10 mg total) by mouth once daily., Disp: 90 tablet, Rfl: 3    metoprolol succinate (TOPROL-XL) 25 MG 24 hr tablet, Take 1 tablet (25 mg total) by mouth once daily., Disp: 90 tablet, Rfl: 3    ramipriL (ALTACE) 10 MG capsule, Take 1 capsule (10 mg total) by mouth once daily., Disp: 90 capsule, Rfl: 3    rosuvastatin (CRESTOR) 20 MG tablet, Take 1 tablet (20 mg total) by mouth once daily., Disp: 90 tablet, Rfl: 3    blood-glucose sensor (DEXCOM G7 SENSOR) Julia, 1 Device by Misc.(Non-Drug; Combo Route) route every 10 days., Disp: 3 each, Rfl: 11    torsemide (DEMADEX) 20 MG Tab, Take 1 tablet (20 mg total) by mouth once daily., Disp: 90 tablet, Rfl: 3

## 2025-04-16 NOTE — ASSESSMENT & PLAN NOTE
AFib tolerated well.  No plans for cardioversion or EP assessment at this time.  He is on low-dose Toprol.  He takes Eliquis.

## 2025-05-12 ENCOUNTER — OFFICE VISIT (OUTPATIENT)
Dept: ENDOCRINOLOGY | Facility: CLINIC | Age: 80
End: 2025-05-12
Payer: MEDICARE

## 2025-05-12 ENCOUNTER — TELEPHONE (OUTPATIENT)
Dept: ENDOCRINOLOGY | Facility: CLINIC | Age: 80
End: 2025-05-12

## 2025-05-12 VITALS
HEIGHT: 71 IN | BODY MASS INDEX: 32.99 KG/M2 | DIASTOLIC BLOOD PRESSURE: 90 MMHG | WEIGHT: 235.63 LBS | SYSTOLIC BLOOD PRESSURE: 120 MMHG

## 2025-05-12 DIAGNOSIS — N18.31 TYPE 2 DIABETES MELLITUS WITH STAGE 3A CHRONIC KIDNEY DISEASE, WITH LONG-TERM CURRENT USE OF INSULIN: Primary | ICD-10-CM

## 2025-05-12 DIAGNOSIS — Z79.4 TYPE 2 DIABETES MELLITUS WITH STAGE 3A CHRONIC KIDNEY DISEASE, WITH LONG-TERM CURRENT USE OF INSULIN: Primary | ICD-10-CM

## 2025-05-12 DIAGNOSIS — R79.89 ELEVATED TSH: ICD-10-CM

## 2025-05-12 DIAGNOSIS — I50.32 CHRONIC DIASTOLIC HEART FAILURE: ICD-10-CM

## 2025-05-12 DIAGNOSIS — E66.812 CLASS 2 SEVERE OBESITY DUE TO EXCESS CALORIES WITH SERIOUS COMORBIDITY AND BODY MASS INDEX (BMI) OF 35.0 TO 35.9 IN ADULT: ICD-10-CM

## 2025-05-12 DIAGNOSIS — E66.01 CLASS 2 SEVERE OBESITY DUE TO EXCESS CALORIES WITH SERIOUS COMORBIDITY AND BODY MASS INDEX (BMI) OF 35.0 TO 35.9 IN ADULT: ICD-10-CM

## 2025-05-12 DIAGNOSIS — E11.22 TYPE 2 DIABETES MELLITUS WITH STAGE 3A CHRONIC KIDNEY DISEASE, WITH LONG-TERM CURRENT USE OF INSULIN: Primary | ICD-10-CM

## 2025-05-12 DIAGNOSIS — I25.10 CORONARY ARTERY DISEASE INVOLVING NATIVE CORONARY ARTERY OF NATIVE HEART WITHOUT ANGINA PECTORIS: ICD-10-CM

## 2025-05-12 PROCEDURE — 99214 OFFICE O/P EST MOD 30 MIN: CPT | Mod: S$GLB,,, | Performed by: STUDENT IN AN ORGANIZED HEALTH CARE EDUCATION/TRAINING PROGRAM

## 2025-05-12 PROCEDURE — 1160F RVW MEDS BY RX/DR IN RCRD: CPT | Mod: CPTII,S$GLB,, | Performed by: STUDENT IN AN ORGANIZED HEALTH CARE EDUCATION/TRAINING PROGRAM

## 2025-05-12 PROCEDURE — 99999 PR PBB SHADOW E&M-EST. PATIENT-LVL III: CPT | Mod: PBBFAC,,, | Performed by: STUDENT IN AN ORGANIZED HEALTH CARE EDUCATION/TRAINING PROGRAM

## 2025-05-12 PROCEDURE — 1159F MED LIST DOCD IN RCRD: CPT | Mod: CPTII,S$GLB,, | Performed by: STUDENT IN AN ORGANIZED HEALTH CARE EDUCATION/TRAINING PROGRAM

## 2025-05-12 PROCEDURE — 3074F SYST BP LT 130 MM HG: CPT | Mod: CPTII,S$GLB,, | Performed by: STUDENT IN AN ORGANIZED HEALTH CARE EDUCATION/TRAINING PROGRAM

## 2025-05-12 PROCEDURE — 3080F DIAST BP >= 90 MM HG: CPT | Mod: CPTII,S$GLB,, | Performed by: STUDENT IN AN ORGANIZED HEALTH CARE EDUCATION/TRAINING PROGRAM

## 2025-05-12 PROCEDURE — G2211 COMPLEX E/M VISIT ADD ON: HCPCS | Mod: S$GLB,,, | Performed by: STUDENT IN AN ORGANIZED HEALTH CARE EDUCATION/TRAINING PROGRAM

## 2025-05-12 NOTE — PROGRESS NOTES
Subjective:      Patient ID: Adan Bermudez is a 80 y.o. male.    Chief Complaint:  Type 2 diabetes mellitus    History of Present Illness  This is a 80 y.o. male. with a past medical history of type 2 diabetes mellitus, HTN, Afib here for follow up    Type 2 diabetes mellitus    Current diabetes medications:  - Jardiance 10 mg daily - started for CHF by Cardiology  - Novolog SSI 4 times daily    INSULIN CORRECTION SCALE    Glucose                 Insulin           181-200               +2 units                     201-250               +4 units                      251-300               +6 units                     301-350               +8 units                      >350                    +10 units                       Past diabetes medications:  - Metformin    Lab Results   Component Value Date    CREATININE 1.4 07/21/2023    EGFRNORACEVR 52 (A) 07/21/2023       Known diabetic complications: nephropathy    Weight:  Wt Readings from Last 6 Encounters:   05/12/25 106.9 kg (235 lb 9.6 oz)   04/16/25 107.5 kg (236 lb 15.9 oz)   02/12/25 111.5 kg (245 lb 13 oz)   11/13/24 124 kg (273 lb 5.9 oz)   11/01/24 124.5 kg (274 lb 6.4 oz)   07/17/24 124 kg (273 lb 5.9 oz)         Family history: Mother side    Blood glucose monitoring at home:               Diabetes Management Status  Statin: Taking  ACE/ARB: Taking    Screening or Prevention Patient's value   HgA1C Testing and Control   Lab Results   Component Value Date    HGBA1C 5.8 (H) 07/20/2023        LDL control Lab Results   Component Value Date    LDLCALC 53.2 (L) 07/20/2023      Nephropathy screening Lab Results   Component Value Date    MICALBCREAT 8.7 07/20/2023            Outside labs  6/1/22  A1c 7.2%    3/26/24  A1c 5.8%  TSH 4.5  LDL 39, HDL 48, Tg 114  Cr 1.2    4/29/25  A1c 5.9%  TSH 3.4  FT4 1.56 (H)  LDL 51, HDL 43, Tg 91  Cr 1.04, GFR 68  UACR 20 mg/g          Lab Results   Component Value Date    TSH 7.149 (H) 07/20/2023         Review of Systems  As  "above    Social and family history reviewed  Current medications and allergies reviewed    Objective:   BP (!) 120/90 (BP Location: Right arm, Patient Position: Sitting)   Ht 5' 11" (1.803 m)   Wt 106.9 kg (235 lb 9.6 oz)   BMI 32.86 kg/m²   Physical Exam  Alert, oriented    BP Readings from Last 1 Encounters:   05/12/25 (!) 120/90      Wt Readings from Last 1 Encounters:   05/12/25 0957 106.9 kg (235 lb 9.6 oz)     Body mass index is 32.86 kg/m².    Lab Review:   Lab Results   Component Value Date    HGBA1C 5.8 (H) 07/20/2023     Lab Results   Component Value Date    CHOL 115 (L) 07/20/2023    HDL 47 07/20/2023    LDLCALC 53.2 (L) 07/20/2023    TRIG 74 07/20/2023    CHOLHDL 40.9 07/20/2023     Lab Results   Component Value Date     07/21/2023    K 3.9 07/21/2023     07/21/2023    CO2 24 07/21/2023    GLU 96 07/21/2023    BUN 24 (H) 07/21/2023    CREATININE 1.4 07/21/2023    CALCIUM 8.6 (L) 07/21/2023    PROT 6.7 07/21/2023    ALBUMIN 3.3 (L) 07/21/2023    BILITOT 0.6 07/21/2023    ALKPHOS 73 07/21/2023    AST 15 07/21/2023    ALT 13 07/21/2023    ANIONGAP 10 07/21/2023    TSH 7.149 (H) 07/20/2023       All pertinent labs reviewed    Assessment and Plan     Type 2 diabetes mellitus with stage 3a chronic kidney disease, with long-term current use of insulin  Glucose controlled with average glucose 130s and TIR > 90%. Minimal hyperglycemia. No hypoglycemia. A1c is 5.9% which is at goal. He is using a SSI Novolog only with minimal requirements.    He is on an SGLT-2 inhibitor started for CHF.    Reports he will see PCP later this year for labs. Advised to forward me results.    Plan  - Continue Jardiance 10 mg daily  - Continue Novolog SSI 4 times daily  - Continue Dexcom G7    Labs per PCP - Dr. Angus Huval    F/u 6 months      Elevated TSH  Last outside TSH 3.4 which is at goal for age    Class 2 severe obesity due to excess calories with serious comorbidity and body mass index (BMI) of 35.0 to 35.9 " in adult  BMI is now 32  Continue lifestyle changes    Chronic diastolic heart failure  Continue SGLT-2 inhibitor with CV benefit    Coronary artery disease involving native coronary artery of native heart without angina pectoris  Continue SGLT-2 inhibitor with CV benefit          Jose Wilcox MD   Endocrinology

## 2025-05-12 NOTE — TELEPHONE ENCOUNTER
----- Message from Jose Wilcox MD sent at 5/12/2025 10:26 AM CDT -----  Send note from today to Gustavo

## 2025-05-12 NOTE — ASSESSMENT & PLAN NOTE
Glucose controlled with average glucose 130s and TIR > 90%. Minimal hyperglycemia. No hypoglycemia. A1c is 5.9% which is at goal. He is using a SSI Novolog only with minimal requirements.    He is on an SGLT-2 inhibitor started for CHF.    Reports he will see PCP later this year for labs. Advised to forward me results.    Plan  - Continue Jardiance 10 mg daily  - Continue Novolog SSI 4 times daily  - Continue Dexcom G7    Labs per PCP - Dr. Angus Ervin    F/u 6 months

## (undated) DEVICE — GLOVE BIOGEL SKINSENSE PI 7.5

## (undated) DEVICE — TRAY NERVE BLOCK

## (undated) DEVICE — SYR 10CC LUER LOCK

## (undated) DEVICE — SKIN MARKER DEVON 160

## (undated) DEVICE — NEEDLE SPINAL CLR HUB 22GX3